# Patient Record
Sex: FEMALE | Race: BLACK OR AFRICAN AMERICAN | NOT HISPANIC OR LATINO | Employment: OTHER | URBAN - METROPOLITAN AREA
[De-identification: names, ages, dates, MRNs, and addresses within clinical notes are randomized per-mention and may not be internally consistent; named-entity substitution may affect disease eponyms.]

---

## 2017-12-18 ENCOUNTER — HOSPITAL ENCOUNTER (EMERGENCY)
Facility: HOSPITAL | Age: 72
Discharge: HOME | End: 2017-12-18
Attending: EMERGENCY MEDICINE
Payer: MEDICARE

## 2017-12-18 VITALS
HEIGHT: 72 IN | SYSTOLIC BLOOD PRESSURE: 109 MMHG | TEMPERATURE: 97 F | HEART RATE: 58 BPM | OXYGEN SATURATION: 97 % | BODY MASS INDEX: 17.88 KG/M2 | DIASTOLIC BLOOD PRESSURE: 57 MMHG | RESPIRATION RATE: 16 BRPM | WEIGHT: 132 LBS

## 2017-12-18 DIAGNOSIS — M54.30 SCIATICA, UNSPECIFIED LATERALITY: ICD-10-CM

## 2017-12-18 DIAGNOSIS — M54.50 LOW BACK PAIN: Primary | ICD-10-CM

## 2017-12-18 PROCEDURE — 63600175 PHARM REV CODE 636 W HCPCS: Performed by: PHYSICIAN ASSISTANT

## 2017-12-18 PROCEDURE — 96372 THER/PROPH/DIAG INJ SC/IM: CPT

## 2017-12-18 PROCEDURE — 25000003 PHARM REV CODE 250: Performed by: PHYSICIAN ASSISTANT

## 2017-12-18 PROCEDURE — 99284 EMERGENCY DEPT VISIT MOD MDM: CPT | Mod: 25

## 2017-12-18 RX ORDER — SULFAMETHOXAZOLE AND TRIMETHOPRIM 800; 160 MG/1; MG/1
1 TABLET ORAL 2 TIMES DAILY
COMMUNITY
End: 2021-02-21 | Stop reason: CLARIF

## 2017-12-18 RX ORDER — TRAMADOL HYDROCHLORIDE 50 MG/1
50 TABLET ORAL EVERY 6 HOURS PRN
Qty: 12 TABLET | Refills: 0 | Status: SHIPPED | OUTPATIENT
Start: 2017-12-18 | End: 2017-12-28

## 2017-12-18 RX ORDER — TRIAMCINOLONE ACETONIDE 40 MG/ML
80 INJECTION, SUSPENSION INTRA-ARTICULAR; INTRAMUSCULAR
Status: COMPLETED | OUTPATIENT
Start: 2017-12-18 | End: 2017-12-18

## 2017-12-18 RX ORDER — MELOXICAM 15 MG/1
15 TABLET ORAL DAILY
Qty: 7 TABLET | Refills: 0 | Status: SHIPPED | OUTPATIENT
Start: 2017-12-18 | End: 2021-02-21 | Stop reason: CLARIF

## 2017-12-18 RX ORDER — METHOCARBAMOL 750 MG/1
750 TABLET, FILM COATED ORAL EVERY 8 HOURS PRN
Qty: 20 TABLET | Refills: 0 | Status: SHIPPED | OUTPATIENT
Start: 2017-12-18 | End: 2017-12-28

## 2017-12-18 RX ORDER — TRAMADOL HYDROCHLORIDE 50 MG/1
50 TABLET ORAL
Status: COMPLETED | OUTPATIENT
Start: 2017-12-18 | End: 2017-12-18

## 2017-12-18 RX ORDER — ACETAMINOPHEN 500 MG
1000 TABLET ORAL
Status: COMPLETED | OUTPATIENT
Start: 2017-12-18 | End: 2017-12-18

## 2017-12-18 RX ADMIN — TRAMADOL HYDROCHLORIDE 50 MG: 50 TABLET, COATED ORAL at 12:12

## 2017-12-18 RX ADMIN — ACETAMINOPHEN 1000 MG: 500 TABLET ORAL at 12:12

## 2017-12-18 RX ADMIN — TRIAMCINOLONE ACETONIDE 80 MG: 40 INJECTION, SUSPENSION INTRA-ARTICULAR; INTRAMUSCULAR at 12:12

## 2017-12-18 NOTE — ED NOTES
Pt awake alert oriented X 4, pt reports lower back pain for the past 2 weeks, pt was treated in ED a week ago for same complaint dx with UTI and sent home with pain patch, pt reports taking medication as instructed, pt reports pain has not been relieved with patch, denies injury or trauma, lumbar tender to palpate pain worse with movement, pt family at bedside pt presents in wheelchair, denies fever, denies abd pain, denies n/v/d, denies chest pain or sob
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
## 2021-02-21 ENCOUNTER — HOSPITAL ENCOUNTER (INPATIENT)
Facility: HOSPITAL | Age: 76
LOS: 3 days | Discharge: HOME | DRG: 440 | End: 2021-02-24
Attending: INTERNAL MEDICINE | Admitting: INTERNAL MEDICINE
Payer: MEDICARE

## 2021-02-21 DIAGNOSIS — R10.10 UPPER ABDOMINAL PAIN: ICD-10-CM

## 2021-02-21 DIAGNOSIS — R91.1 SOLITARY PULMONARY NODULE: ICD-10-CM

## 2021-02-21 DIAGNOSIS — K85.00 IDIOPATHIC ACUTE PANCREATITIS, UNSPECIFIED COMPLICATION STATUS: ICD-10-CM

## 2021-02-21 DIAGNOSIS — R91.1 NODULE OF LOWER LOBE OF LEFT LUNG: ICD-10-CM

## 2021-02-21 DIAGNOSIS — F02.80 DEMENTIA DUE TO HEAD TRAUMA WITHOUT BEHAVIORAL DISTURBANCE: ICD-10-CM

## 2021-02-21 DIAGNOSIS — R91.8 MASS OF LOWER LOBE OF LEFT LUNG: ICD-10-CM

## 2021-02-21 DIAGNOSIS — S09.90XS DEMENTIA DUE TO HEAD TRAUMA WITHOUT BEHAVIORAL DISTURBANCE: ICD-10-CM

## 2021-02-21 DIAGNOSIS — K85.00 IDIOPATHIC ACUTE PANCREATITIS WITHOUT INFECTION OR NECROSIS: Primary | ICD-10-CM

## 2021-02-21 LAB
ALBUMIN SERPL BCP-MCNC: 4 G/DL (ref 3.5–5.2)
ALP SERPL-CCNC: 95 U/L (ref 55–135)
ALT SERPL W/O P-5'-P-CCNC: 6 U/L (ref 10–44)
ANION GAP SERPL CALC-SCNC: 14 MMOL/L (ref 8–16)
AST SERPL-CCNC: 19 U/L (ref 10–40)
BASOPHILS # BLD AUTO: 0.02 K/UL (ref 0–0.2)
BASOPHILS NFR BLD: 0.2 % (ref 0–1.9)
BILIRUB SERPL-MCNC: 0.2 MG/DL (ref 0.1–1)
BUN SERPL-MCNC: 16 MG/DL (ref 8–23)
CALCIUM SERPL-MCNC: 9.5 MG/DL (ref 8.7–10.5)
CHLORIDE SERPL-SCNC: 110 MMOL/L (ref 95–110)
CHOLEST SERPL-MCNC: 203 MG/DL (ref 120–199)
CHOLEST/HDLC SERPL: 4.6 {RATIO} (ref 2–5)
CO2 SERPL-SCNC: 18 MMOL/L (ref 23–29)
CREAT SERPL-MCNC: 1 MG/DL (ref 0.5–1.4)
CTP QC/QA: YES
DIFFERENTIAL METHOD: ABNORMAL
EOSINOPHIL # BLD AUTO: 0 K/UL (ref 0–0.5)
EOSINOPHIL NFR BLD: 0 % (ref 0–8)
ERYTHROCYTE [DISTWIDTH] IN BLOOD BY AUTOMATED COUNT: 14.6 % (ref 11.5–14.5)
EST. GFR  (AFRICAN AMERICAN): >60 ML/MIN/1.73 M^2
EST. GFR  (NON AFRICAN AMERICAN): 55 ML/MIN/1.73 M^2
ESTIMATED AVG GLUCOSE: 100 MG/DL (ref 68–131)
GLUCOSE SERPL-MCNC: 169 MG/DL (ref 70–110)
HBA1C MFR BLD: 5.1 % (ref 4–5.6)
HCT VFR BLD AUTO: 46.3 % (ref 37–48.5)
HDLC SERPL-MCNC: 44 MG/DL (ref 40–75)
HDLC SERPL: 21.7 % (ref 20–50)
HGB BLD-MCNC: 14.2 G/DL (ref 12–16)
IMM GRANULOCYTES # BLD AUTO: 0.04 K/UL (ref 0–0.04)
IMM GRANULOCYTES NFR BLD AUTO: 0.4 % (ref 0–0.5)
LACTATE SERPL-SCNC: 3 MMOL/L (ref 0.5–2.2)
LACTATE SERPL-SCNC: 5.6 MMOL/L (ref 0.5–2.2)
LDH SERPL L TO P-CCNC: 196 U/L (ref 110–260)
LDLC SERPL CALC-MCNC: 148 MG/DL (ref 63–159)
LIPASE SERPL-CCNC: 598 U/L (ref 4–60)
LYMPHOCYTES # BLD AUTO: 0.9 K/UL (ref 1–4.8)
LYMPHOCYTES NFR BLD: 9.3 % (ref 18–48)
MCH RBC QN AUTO: 26 PG (ref 27–31)
MCHC RBC AUTO-ENTMCNC: 30.7 G/DL (ref 32–36)
MCV RBC AUTO: 85 FL (ref 82–98)
MONOCYTES # BLD AUTO: 0.3 K/UL (ref 0.3–1)
MONOCYTES NFR BLD: 3.1 % (ref 4–15)
NEUTROPHILS # BLD AUTO: 8.5 K/UL (ref 1.8–7.7)
NEUTROPHILS NFR BLD: 87 % (ref 38–73)
NONHDLC SERPL-MCNC: 159 MG/DL
NRBC BLD-RTO: 0 /100 WBC
PLATELET # BLD AUTO: 210 K/UL (ref 150–350)
PMV BLD AUTO: 10.6 FL (ref 9.2–12.9)
POCT GLUCOSE: 98 MG/DL (ref 70–110)
POTASSIUM SERPL-SCNC: 3.8 MMOL/L (ref 3.5–5.1)
PROT SERPL-MCNC: 7.6 G/DL (ref 6–8.4)
RBC # BLD AUTO: 5.47 M/UL (ref 4–5.4)
SARS-COV-2 RDRP RESP QL NAA+PROBE: NEGATIVE
SODIUM SERPL-SCNC: 142 MMOL/L (ref 136–145)
TRIGL SERPL-MCNC: 55 MG/DL (ref 30–150)
TROPONIN I SERPL DL<=0.01 NG/ML-MCNC: 0.01 NG/ML (ref 0–0.03)
TSH SERPL DL<=0.005 MIU/L-ACNC: 0.75 UIU/ML (ref 0.4–4)
WBC # BLD AUTO: 9.74 K/UL (ref 3.9–12.7)

## 2021-02-21 PROCEDURE — 25500020 PHARM REV CODE 255: Performed by: EMERGENCY MEDICINE

## 2021-02-21 PROCEDURE — 80053 COMPREHEN METABOLIC PANEL: CPT

## 2021-02-21 PROCEDURE — 83605 ASSAY OF LACTIC ACID: CPT

## 2021-02-21 PROCEDURE — 63600175 PHARM REV CODE 636 W HCPCS: Performed by: STUDENT IN AN ORGANIZED HEALTH CARE EDUCATION/TRAINING PROGRAM

## 2021-02-21 PROCEDURE — 96361 HYDRATE IV INFUSION ADD-ON: CPT

## 2021-02-21 PROCEDURE — 96361 HYDRATE IV INFUSION ADD-ON: CPT | Performed by: EMERGENCY MEDICINE

## 2021-02-21 PROCEDURE — 99285 EMERGENCY DEPT VISIT HI MDM: CPT | Mod: 25

## 2021-02-21 PROCEDURE — G0378 HOSPITAL OBSERVATION PER HR: HCPCS

## 2021-02-21 PROCEDURE — 93010 EKG 12-LEAD: ICD-10-PCS | Mod: ,,, | Performed by: INTERNAL MEDICINE

## 2021-02-21 PROCEDURE — 83690 ASSAY OF LIPASE: CPT

## 2021-02-21 PROCEDURE — 96376 TX/PRO/DX INJ SAME DRUG ADON: CPT

## 2021-02-21 PROCEDURE — 84484 ASSAY OF TROPONIN QUANT: CPT

## 2021-02-21 PROCEDURE — U0002 COVID-19 LAB TEST NON-CDC: HCPCS | Performed by: EMERGENCY MEDICINE

## 2021-02-21 PROCEDURE — 85025 COMPLETE CBC W/AUTO DIFF WBC: CPT

## 2021-02-21 PROCEDURE — 96375 TX/PRO/DX INJ NEW DRUG ADDON: CPT

## 2021-02-21 PROCEDURE — 83036 HEMOGLOBIN GLYCOSYLATED A1C: CPT

## 2021-02-21 PROCEDURE — 93010 ELECTROCARDIOGRAM REPORT: CPT | Mod: ,,, | Performed by: INTERNAL MEDICINE

## 2021-02-21 PROCEDURE — 63600175 PHARM REV CODE 636 W HCPCS: Performed by: EMERGENCY MEDICINE

## 2021-02-21 PROCEDURE — 96374 THER/PROPH/DIAG INJ IV PUSH: CPT

## 2021-02-21 PROCEDURE — 93005 ELECTROCARDIOGRAM TRACING: CPT

## 2021-02-21 PROCEDURE — 83615 LACTATE (LD) (LDH) ENZYME: CPT

## 2021-02-21 PROCEDURE — 84443 ASSAY THYROID STIM HORMONE: CPT

## 2021-02-21 PROCEDURE — 83605 ASSAY OF LACTIC ACID: CPT | Mod: 91

## 2021-02-21 PROCEDURE — 11000001 HC ACUTE MED/SURG PRIVATE ROOM

## 2021-02-21 PROCEDURE — 80061 LIPID PANEL: CPT

## 2021-02-21 RX ORDER — SODIUM CHLORIDE, SODIUM LACTATE, POTASSIUM CHLORIDE, CALCIUM CHLORIDE 600; 310; 30; 20 MG/100ML; MG/100ML; MG/100ML; MG/100ML
INJECTION, SOLUTION INTRAVENOUS CONTINUOUS
Status: DISCONTINUED | OUTPATIENT
Start: 2021-02-21 | End: 2021-02-24 | Stop reason: HOSPADM

## 2021-02-21 RX ORDER — GLUCAGON 1 MG
1 KIT INJECTION
Status: DISCONTINUED | OUTPATIENT
Start: 2021-02-21 | End: 2021-02-24 | Stop reason: HOSPADM

## 2021-02-21 RX ORDER — IBUPROFEN 200 MG
16 TABLET ORAL
Status: DISCONTINUED | OUTPATIENT
Start: 2021-02-21 | End: 2021-02-24 | Stop reason: HOSPADM

## 2021-02-21 RX ORDER — HYDROCODONE BITARTRATE AND ACETAMINOPHEN 5; 325 MG/1; MG/1
1 TABLET ORAL EVERY 6 HOURS PRN
Status: DISCONTINUED | OUTPATIENT
Start: 2021-02-21 | End: 2021-02-21

## 2021-02-21 RX ORDER — MORPHINE SULFATE 4 MG/ML
4 INJECTION, SOLUTION INTRAMUSCULAR; INTRAVENOUS
Status: COMPLETED | OUTPATIENT
Start: 2021-02-21 | End: 2021-02-21

## 2021-02-21 RX ORDER — MORPHINE SULFATE 2 MG/ML
2 INJECTION, SOLUTION INTRAMUSCULAR; INTRAVENOUS
Status: COMPLETED | OUTPATIENT
Start: 2021-02-21 | End: 2021-02-21

## 2021-02-21 RX ORDER — ONDANSETRON 2 MG/ML
4 INJECTION INTRAMUSCULAR; INTRAVENOUS
Status: COMPLETED | OUTPATIENT
Start: 2021-02-21 | End: 2021-02-21

## 2021-02-21 RX ORDER — SODIUM CHLORIDE 0.9 % (FLUSH) 0.9 %
10 SYRINGE (ML) INJECTION
Status: DISCONTINUED | OUTPATIENT
Start: 2021-02-21 | End: 2021-02-24 | Stop reason: HOSPADM

## 2021-02-21 RX ORDER — KETOROLAC TROMETHAMINE 30 MG/ML
15 INJECTION, SOLUTION INTRAMUSCULAR; INTRAVENOUS EVERY 6 HOURS PRN
Status: DISCONTINUED | OUTPATIENT
Start: 2021-02-21 | End: 2021-02-22

## 2021-02-21 RX ORDER — ONDANSETRON 2 MG/ML
4 INJECTION INTRAMUSCULAR; INTRAVENOUS EVERY 8 HOURS PRN
Status: DISCONTINUED | OUTPATIENT
Start: 2021-02-21 | End: 2021-02-24 | Stop reason: HOSPADM

## 2021-02-21 RX ORDER — MORPHINE SULFATE 4 MG/ML
4 INJECTION, SOLUTION INTRAMUSCULAR; INTRAVENOUS EVERY 6 HOURS PRN
Status: DISCONTINUED | OUTPATIENT
Start: 2021-02-21 | End: 2021-02-24 | Stop reason: HOSPADM

## 2021-02-21 RX ORDER — IBUPROFEN 200 MG
24 TABLET ORAL
Status: DISCONTINUED | OUTPATIENT
Start: 2021-02-21 | End: 2021-02-24 | Stop reason: HOSPADM

## 2021-02-21 RX ORDER — KETOROLAC TROMETHAMINE 30 MG/ML
15 INJECTION, SOLUTION INTRAMUSCULAR; INTRAVENOUS EVERY 6 HOURS PRN
Status: DISCONTINUED | OUTPATIENT
Start: 2021-02-21 | End: 2021-02-21

## 2021-02-21 RX ORDER — ENOXAPARIN SODIUM 100 MG/ML
40 INJECTION SUBCUTANEOUS EVERY 24 HOURS
Status: DISCONTINUED | OUTPATIENT
Start: 2021-02-21 | End: 2021-02-24 | Stop reason: HOSPADM

## 2021-02-21 RX ADMIN — SODIUM CHLORIDE, SODIUM LACTATE, POTASSIUM CHLORIDE, AND CALCIUM CHLORIDE 500 ML: .6; .31; .03; .02 INJECTION, SOLUTION INTRAVENOUS at 05:02

## 2021-02-21 RX ADMIN — ENOXAPARIN SODIUM 40 MG: 40 INJECTION SUBCUTANEOUS at 10:02

## 2021-02-21 RX ADMIN — MORPHINE SULFATE 2 MG: 2 INJECTION, SOLUTION INTRAMUSCULAR; INTRAVENOUS at 08:02

## 2021-02-21 RX ADMIN — MORPHINE SULFATE 2 MG: 2 INJECTION, SOLUTION INTRAMUSCULAR; INTRAVENOUS at 05:02

## 2021-02-21 RX ADMIN — SODIUM CHLORIDE, SODIUM LACTATE, POTASSIUM CHLORIDE, AND CALCIUM CHLORIDE 1000 ML: .6; .31; .03; .02 INJECTION, SOLUTION INTRAVENOUS at 07:02

## 2021-02-21 RX ADMIN — IOHEXOL 75 ML: 350 INJECTION, SOLUTION INTRAVENOUS at 07:02

## 2021-02-21 RX ADMIN — ONDANSETRON HYDROCHLORIDE 4 MG: 2 SOLUTION INTRAMUSCULAR; INTRAVENOUS at 05:02

## 2021-02-21 RX ADMIN — MORPHINE SULFATE 4 MG: 4 INJECTION, SOLUTION INTRAMUSCULAR; INTRAVENOUS at 07:02

## 2021-02-21 RX ADMIN — SODIUM CHLORIDE, SODIUM LACTATE, POTASSIUM CHLORIDE, AND CALCIUM CHLORIDE: .6; .31; .03; .02 INJECTION, SOLUTION INTRAVENOUS at 09:02

## 2021-02-22 LAB
ALBUMIN SERPL BCP-MCNC: 3.4 G/DL (ref 3.5–5.2)
ALP SERPL-CCNC: 81 U/L (ref 55–135)
ALT SERPL W/O P-5'-P-CCNC: <5 U/L (ref 10–44)
ANION GAP SERPL CALC-SCNC: 7 MMOL/L (ref 8–16)
APTT BLDCRRT: 31 SEC (ref 21–32)
AST SERPL-CCNC: 20 U/L (ref 10–40)
BASOPHILS # BLD AUTO: 0.01 K/UL (ref 0–0.2)
BASOPHILS NFR BLD: 0.1 % (ref 0–1.9)
BILIRUB SERPL-MCNC: 0.4 MG/DL (ref 0.1–1)
BUN SERPL-MCNC: 12 MG/DL (ref 8–23)
CALCIUM SERPL-MCNC: 9.1 MG/DL (ref 8.7–10.5)
CHLORIDE SERPL-SCNC: 109 MMOL/L (ref 95–110)
CO2 SERPL-SCNC: 25 MMOL/L (ref 23–29)
CREAT SERPL-MCNC: 0.8 MG/DL (ref 0.5–1.4)
DIFFERENTIAL METHOD: ABNORMAL
EOSINOPHIL # BLD AUTO: 0 K/UL (ref 0–0.5)
EOSINOPHIL NFR BLD: 0.1 % (ref 0–8)
ERYTHROCYTE [DISTWIDTH] IN BLOOD BY AUTOMATED COUNT: 14.6 % (ref 11.5–14.5)
EST. GFR  (AFRICAN AMERICAN): >60 ML/MIN/1.73 M^2
EST. GFR  (NON AFRICAN AMERICAN): >60 ML/MIN/1.73 M^2
GLUCOSE SERPL-MCNC: 81 MG/DL (ref 70–110)
HCT VFR BLD AUTO: 39.5 % (ref 37–48.5)
HGB BLD-MCNC: 12.2 G/DL (ref 12–16)
IMM GRANULOCYTES # BLD AUTO: 0.03 K/UL (ref 0–0.04)
IMM GRANULOCYTES NFR BLD AUTO: 0.4 % (ref 0–0.5)
INR PPP: 1.1 (ref 0.8–1.2)
LACTATE SERPL-SCNC: 1.1 MMOL/L (ref 0.5–2.2)
LYMPHOCYTES # BLD AUTO: 1.6 K/UL (ref 1–4.8)
LYMPHOCYTES NFR BLD: 18.9 % (ref 18–48)
MAGNESIUM SERPL-MCNC: 1.9 MG/DL (ref 1.6–2.6)
MCH RBC QN AUTO: 25.6 PG (ref 27–31)
MCHC RBC AUTO-ENTMCNC: 30.9 G/DL (ref 32–36)
MCV RBC AUTO: 83 FL (ref 82–98)
MONOCYTES # BLD AUTO: 0.5 K/UL (ref 0.3–1)
MONOCYTES NFR BLD: 6.1 % (ref 4–15)
NEUTROPHILS # BLD AUTO: 6.3 K/UL (ref 1.8–7.7)
NEUTROPHILS NFR BLD: 74.4 % (ref 38–73)
NRBC BLD-RTO: 0 /100 WBC
PHOSPHATE SERPL-MCNC: 3 MG/DL (ref 2.7–4.5)
PLATELET # BLD AUTO: 187 K/UL (ref 150–350)
PMV BLD AUTO: 11.5 FL (ref 9.2–12.9)
POCT GLUCOSE: 101 MG/DL (ref 70–110)
POCT GLUCOSE: 73 MG/DL (ref 70–110)
POTASSIUM SERPL-SCNC: 3.9 MMOL/L (ref 3.5–5.1)
PROT SERPL-MCNC: 6.5 G/DL (ref 6–8.4)
PROTHROMBIN TIME: 11.3 SEC (ref 9–12.5)
RBC # BLD AUTO: 4.77 M/UL (ref 4–5.4)
SODIUM SERPL-SCNC: 141 MMOL/L (ref 136–145)
WBC # BLD AUTO: 8.41 K/UL (ref 3.9–12.7)

## 2021-02-22 PROCEDURE — 63600175 PHARM REV CODE 636 W HCPCS: Performed by: STUDENT IN AN ORGANIZED HEALTH CARE EDUCATION/TRAINING PROGRAM

## 2021-02-22 PROCEDURE — 85025 COMPLETE CBC W/AUTO DIFF WBC: CPT

## 2021-02-22 PROCEDURE — 85730 THROMBOPLASTIN TIME PARTIAL: CPT

## 2021-02-22 PROCEDURE — 97162 PT EVAL MOD COMPLEX 30 MIN: CPT | Performed by: PHYSICAL THERAPIST

## 2021-02-22 PROCEDURE — 25000003 PHARM REV CODE 250: Performed by: STUDENT IN AN ORGANIZED HEALTH CARE EDUCATION/TRAINING PROGRAM

## 2021-02-22 PROCEDURE — 84100 ASSAY OF PHOSPHORUS: CPT

## 2021-02-22 PROCEDURE — 97535 SELF CARE MNGMENT TRAINING: CPT

## 2021-02-22 PROCEDURE — 80053 COMPREHEN METABOLIC PANEL: CPT

## 2021-02-22 PROCEDURE — 81003 URINALYSIS AUTO W/O SCOPE: CPT

## 2021-02-22 PROCEDURE — 36415 COLL VENOUS BLD VENIPUNCTURE: CPT

## 2021-02-22 PROCEDURE — 85610 PROTHROMBIN TIME: CPT

## 2021-02-22 PROCEDURE — 83605 ASSAY OF LACTIC ACID: CPT

## 2021-02-22 PROCEDURE — 11000001 HC ACUTE MED/SURG PRIVATE ROOM

## 2021-02-22 PROCEDURE — 99223 PR INITIAL HOSPITAL CARE,LEVL III: ICD-10-PCS | Mod: AI,,, | Performed by: INTERNAL MEDICINE

## 2021-02-22 PROCEDURE — 97165 OT EVAL LOW COMPLEX 30 MIN: CPT

## 2021-02-22 PROCEDURE — 83735 ASSAY OF MAGNESIUM: CPT

## 2021-02-22 PROCEDURE — 99223 1ST HOSP IP/OBS HIGH 75: CPT | Mod: AI,,, | Performed by: INTERNAL MEDICINE

## 2021-02-22 PROCEDURE — 96376 TX/PRO/DX INJ SAME DRUG ADON: CPT | Performed by: EMERGENCY MEDICINE

## 2021-02-22 PROCEDURE — 99223 1ST HOSP IP/OBS HIGH 75: CPT | Mod: GC,,, | Performed by: INTERNAL MEDICINE

## 2021-02-22 PROCEDURE — 96361 HYDRATE IV INFUSION ADD-ON: CPT | Performed by: EMERGENCY MEDICINE

## 2021-02-22 PROCEDURE — 25500020 PHARM REV CODE 255: Performed by: INTERNAL MEDICINE

## 2021-02-22 PROCEDURE — 99223 PR INITIAL HOSPITAL CARE,LEVL III: ICD-10-PCS | Mod: GC,,, | Performed by: INTERNAL MEDICINE

## 2021-02-22 PROCEDURE — 96372 THER/PROPH/DIAG INJ SC/IM: CPT | Mod: 59 | Performed by: EMERGENCY MEDICINE

## 2021-02-22 RX ORDER — TRAMADOL HYDROCHLORIDE 50 MG/1
50 TABLET ORAL EVERY 6 HOURS PRN
Status: DISCONTINUED | OUTPATIENT
Start: 2021-02-22 | End: 2021-02-22

## 2021-02-22 RX ORDER — OXYCODONE AND ACETAMINOPHEN 5; 325 MG/1; MG/1
1 TABLET ORAL EVERY 6 HOURS
Status: DISCONTINUED | OUTPATIENT
Start: 2021-02-22 | End: 2021-02-24 | Stop reason: HOSPADM

## 2021-02-22 RX ADMIN — IOHEXOL 75 ML: 350 INJECTION, SOLUTION INTRAVENOUS at 02:02

## 2021-02-22 RX ADMIN — THERA TABS 1 TABLET: TAB at 08:02

## 2021-02-22 RX ADMIN — SODIUM CHLORIDE, SODIUM LACTATE, POTASSIUM CHLORIDE, AND CALCIUM CHLORIDE: .6; .31; .03; .02 INJECTION, SOLUTION INTRAVENOUS at 08:02

## 2021-02-22 RX ADMIN — OXYCODONE HYDROCHLORIDE AND ACETAMINOPHEN 1 TABLET: 5; 325 TABLET ORAL at 11:02

## 2021-02-22 RX ADMIN — ENOXAPARIN SODIUM 40 MG: 40 INJECTION SUBCUTANEOUS at 08:02

## 2021-02-22 RX ADMIN — OXYCODONE HYDROCHLORIDE AND ACETAMINOPHEN 1 TABLET: 5; 325 TABLET ORAL at 06:02

## 2021-02-22 RX ADMIN — MORPHINE SULFATE 4 MG: 4 INJECTION INTRAVENOUS at 08:02

## 2021-02-23 LAB
ALBUMIN SERPL BCP-MCNC: 3.3 G/DL (ref 3.5–5.2)
ALP SERPL-CCNC: 98 U/L (ref 55–135)
ALT SERPL W/O P-5'-P-CCNC: 44 U/L (ref 10–44)
ANION GAP SERPL CALC-SCNC: 7 MMOL/L (ref 8–16)
AST SERPL-CCNC: 109 U/L (ref 10–40)
BASOPHILS # BLD AUTO: 0.01 K/UL (ref 0–0.2)
BASOPHILS NFR BLD: 0.1 % (ref 0–1.9)
BILIRUB SERPL-MCNC: 1 MG/DL (ref 0.1–1)
BILIRUB UR QL STRIP: NEGATIVE
BUN SERPL-MCNC: 7 MG/DL (ref 8–23)
CALCIUM SERPL-MCNC: 8.7 MG/DL (ref 8.7–10.5)
CHLORIDE SERPL-SCNC: 105 MMOL/L (ref 95–110)
CLARITY UR: CLEAR
CO2 SERPL-SCNC: 27 MMOL/L (ref 23–29)
COLOR UR: YELLOW
CREAT SERPL-MCNC: 0.8 MG/DL (ref 0.5–1.4)
DIFFERENTIAL METHOD: ABNORMAL
EOSINOPHIL # BLD AUTO: 0 K/UL (ref 0–0.5)
EOSINOPHIL NFR BLD: 0.5 % (ref 0–8)
ERYTHROCYTE [DISTWIDTH] IN BLOOD BY AUTOMATED COUNT: 14.6 % (ref 11.5–14.5)
EST. GFR  (AFRICAN AMERICAN): >60 ML/MIN/1.73 M^2
EST. GFR  (NON AFRICAN AMERICAN): >60 ML/MIN/1.73 M^2
GLUCOSE SERPL-MCNC: 75 MG/DL (ref 70–110)
GLUCOSE UR QL STRIP: NEGATIVE
HCT VFR BLD AUTO: 35 % (ref 37–48.5)
HGB BLD-MCNC: 11.2 G/DL (ref 12–16)
HGB UR QL STRIP: NEGATIVE
IMM GRANULOCYTES # BLD AUTO: 0.02 K/UL (ref 0–0.04)
IMM GRANULOCYTES NFR BLD AUTO: 0.3 % (ref 0–0.5)
KETONES UR QL STRIP: NEGATIVE
LEUKOCYTE ESTERASE UR QL STRIP: NEGATIVE
LYMPHOCYTES # BLD AUTO: 1.9 K/UL (ref 1–4.8)
LYMPHOCYTES NFR BLD: 25 % (ref 18–48)
MAGNESIUM SERPL-MCNC: 1.7 MG/DL (ref 1.6–2.6)
MCH RBC QN AUTO: 26.2 PG (ref 27–31)
MCHC RBC AUTO-ENTMCNC: 32 G/DL (ref 32–36)
MCV RBC AUTO: 82 FL (ref 82–98)
MONOCYTES # BLD AUTO: 0.1 K/UL (ref 0.3–1)
MONOCYTES NFR BLD: 1.9 % (ref 4–15)
NEUTROPHILS # BLD AUTO: 5.4 K/UL (ref 1.8–7.7)
NEUTROPHILS NFR BLD: 72.2 % (ref 38–73)
NITRITE UR QL STRIP: NEGATIVE
NRBC BLD-RTO: 0 /100 WBC
PH UR STRIP: 6 [PH] (ref 5–8)
PHOSPHATE SERPL-MCNC: 2.7 MG/DL (ref 2.7–4.5)
PLATELET # BLD AUTO: 161 K/UL (ref 150–350)
PMV BLD AUTO: 11.9 FL (ref 9.2–12.9)
POCT GLUCOSE: 104 MG/DL (ref 70–110)
POCT GLUCOSE: 61 MG/DL (ref 70–110)
POCT GLUCOSE: 69 MG/DL (ref 70–110)
POCT GLUCOSE: 71 MG/DL (ref 70–110)
POCT GLUCOSE: 72 MG/DL (ref 70–110)
POTASSIUM SERPL-SCNC: 3.4 MMOL/L (ref 3.5–5.1)
PROT SERPL-MCNC: 6.4 G/DL (ref 6–8.4)
PROT UR QL STRIP: NEGATIVE
RBC # BLD AUTO: 4.27 M/UL (ref 4–5.4)
RHEUMATOID FACT SERPL-ACNC: <10 IU/ML (ref 0–15)
SODIUM SERPL-SCNC: 139 MMOL/L (ref 136–145)
SP GR UR STRIP: 1.01 (ref 1–1.03)
URN SPEC COLLECT METH UR: ABNORMAL
UROBILINOGEN UR STRIP-ACNC: ABNORMAL EU/DL
WBC # BLD AUTO: 7.43 K/UL (ref 3.9–12.7)

## 2021-02-23 PROCEDURE — 36415 COLL VENOUS BLD VENIPUNCTURE: CPT

## 2021-02-23 PROCEDURE — 80053 COMPREHEN METABOLIC PANEL: CPT

## 2021-02-23 PROCEDURE — 25000003 PHARM REV CODE 250: Performed by: STUDENT IN AN ORGANIZED HEALTH CARE EDUCATION/TRAINING PROGRAM

## 2021-02-23 PROCEDURE — 86431 RHEUMATOID FACTOR QUANT: CPT

## 2021-02-23 PROCEDURE — 97535 SELF CARE MNGMENT TRAINING: CPT | Mod: CO

## 2021-02-23 PROCEDURE — 83735 ASSAY OF MAGNESIUM: CPT

## 2021-02-23 PROCEDURE — 97530 THERAPEUTIC ACTIVITIES: CPT | Mod: CQ

## 2021-02-23 PROCEDURE — 86200 CCP ANTIBODY: CPT

## 2021-02-23 PROCEDURE — 85025 COMPLETE CBC W/AUTO DIFF WBC: CPT

## 2021-02-23 PROCEDURE — 11000001 HC ACUTE MED/SURG PRIVATE ROOM

## 2021-02-23 PROCEDURE — 86038 ANTINUCLEAR ANTIBODIES: CPT

## 2021-02-23 PROCEDURE — 97116 GAIT TRAINING THERAPY: CPT | Mod: CQ

## 2021-02-23 PROCEDURE — 84100 ASSAY OF PHOSPHORUS: CPT

## 2021-02-23 PROCEDURE — 63600175 PHARM REV CODE 636 W HCPCS: Performed by: STUDENT IN AN ORGANIZED HEALTH CARE EDUCATION/TRAINING PROGRAM

## 2021-02-23 RX ORDER — POLYETHYLENE GLYCOL 3350 17 G/17G
17 POWDER, FOR SOLUTION ORAL 2 TIMES DAILY
Status: DISCONTINUED | OUTPATIENT
Start: 2021-02-23 | End: 2021-02-24 | Stop reason: HOSPADM

## 2021-02-23 RX ORDER — MAGNESIUM SULFATE HEPTAHYDRATE 40 MG/ML
2 INJECTION, SOLUTION INTRAVENOUS ONCE
Status: COMPLETED | OUTPATIENT
Start: 2021-02-23 | End: 2021-02-23

## 2021-02-23 RX ADMIN — THERA TABS 1 TABLET: TAB at 08:02

## 2021-02-23 RX ADMIN — POLYETHYLENE GLYCOL (3350) 17 G: 17 POWDER, FOR SOLUTION ORAL at 08:02

## 2021-02-23 RX ADMIN — MAGNESIUM SULFATE 2 G: 2 INJECTION INTRAVENOUS at 10:02

## 2021-02-23 RX ADMIN — OXYCODONE HYDROCHLORIDE AND ACETAMINOPHEN 1 TABLET: 5; 325 TABLET ORAL at 11:02

## 2021-02-23 RX ADMIN — OXYCODONE HYDROCHLORIDE AND ACETAMINOPHEN 1 TABLET: 5; 325 TABLET ORAL at 12:02

## 2021-02-23 RX ADMIN — OXYCODONE HYDROCHLORIDE AND ACETAMINOPHEN 1 TABLET: 5; 325 TABLET ORAL at 05:02

## 2021-02-23 RX ADMIN — POLYETHYLENE GLYCOL (3350) 17 G: 17 POWDER, FOR SOLUTION ORAL at 10:02

## 2021-02-23 RX ADMIN — SODIUM CHLORIDE, SODIUM LACTATE, POTASSIUM CHLORIDE, AND CALCIUM CHLORIDE: .6; .31; .03; .02 INJECTION, SOLUTION INTRAVENOUS at 03:02

## 2021-02-23 RX ADMIN — SODIUM CHLORIDE, SODIUM LACTATE, POTASSIUM CHLORIDE, AND CALCIUM CHLORIDE: .6; .31; .03; .02 INJECTION, SOLUTION INTRAVENOUS at 09:02

## 2021-02-23 RX ADMIN — POTASSIUM BICARBONATE 25 MEQ: 977.5 TABLET, EFFERVESCENT ORAL at 01:02

## 2021-02-23 RX ADMIN — DEXTROSE MONOHYDRATE 12.5 G: 500 INJECTION PARENTERAL at 04:02

## 2021-02-23 RX ADMIN — POTASSIUM BICARBONATE 25 MEQ: 977.5 TABLET, EFFERVESCENT ORAL at 11:02

## 2021-02-23 RX ADMIN — DEXTROSE MONOHYDRATE 12.5 G: 500 INJECTION PARENTERAL at 05:02

## 2021-02-23 RX ADMIN — ENOXAPARIN SODIUM 40 MG: 40 INJECTION SUBCUTANEOUS at 08:02

## 2021-02-23 RX ADMIN — DEXTROSE MONOHYDRATE 25 G: 25 INJECTION, SOLUTION INTRAVENOUS at 03:02

## 2021-02-24 VITALS
HEIGHT: 72 IN | SYSTOLIC BLOOD PRESSURE: 115 MMHG | OXYGEN SATURATION: 97 % | BODY MASS INDEX: 20.88 KG/M2 | RESPIRATION RATE: 24 BRPM | WEIGHT: 154.13 LBS | DIASTOLIC BLOOD PRESSURE: 65 MMHG | TEMPERATURE: 99 F | HEART RATE: 88 BPM

## 2021-02-24 DIAGNOSIS — R91.1 NODULE OF LOWER LOBE OF LEFT LUNG: Primary | ICD-10-CM

## 2021-02-24 LAB
ALBUMIN SERPL BCP-MCNC: 3.2 G/DL (ref 3.5–5.2)
ALP SERPL-CCNC: 114 U/L (ref 55–135)
ALT SERPL W/O P-5'-P-CCNC: 37 U/L (ref 10–44)
ANA SER QL IF: NORMAL
ANION GAP SERPL CALC-SCNC: 10 MMOL/L (ref 8–16)
AST SERPL-CCNC: 89 U/L (ref 10–40)
BASOPHILS # BLD AUTO: 0.02 K/UL (ref 0–0.2)
BASOPHILS NFR BLD: 0.2 % (ref 0–1.9)
BILIRUB SERPL-MCNC: 1 MG/DL (ref 0.1–1)
BUN SERPL-MCNC: 5 MG/DL (ref 8–23)
CALCIUM SERPL-MCNC: 8.8 MG/DL (ref 8.7–10.5)
CHLORIDE SERPL-SCNC: 108 MMOL/L (ref 95–110)
CO2 SERPL-SCNC: 18 MMOL/L (ref 23–29)
CREAT SERPL-MCNC: 0.7 MG/DL (ref 0.5–1.4)
DIFFERENTIAL METHOD: ABNORMAL
EOSINOPHIL # BLD AUTO: 0.1 K/UL (ref 0–0.5)
EOSINOPHIL NFR BLD: 0.9 % (ref 0–8)
ERYTHROCYTE [DISTWIDTH] IN BLOOD BY AUTOMATED COUNT: 15.1 % (ref 11.5–14.5)
EST. GFR  (AFRICAN AMERICAN): >60 ML/MIN/1.73 M^2
EST. GFR  (NON AFRICAN AMERICAN): >60 ML/MIN/1.73 M^2
GLUCOSE SERPL-MCNC: 68 MG/DL (ref 70–110)
HCT VFR BLD AUTO: 41.7 % (ref 37–48.5)
HGB BLD-MCNC: 12.5 G/DL (ref 12–16)
IMM GRANULOCYTES # BLD AUTO: 0.03 K/UL (ref 0–0.04)
IMM GRANULOCYTES NFR BLD AUTO: 0.3 % (ref 0–0.5)
LYMPHOCYTES # BLD AUTO: 2.1 K/UL (ref 1–4.8)
LYMPHOCYTES NFR BLD: 22.9 % (ref 18–48)
MAGNESIUM SERPL-MCNC: 2.2 MG/DL (ref 1.6–2.6)
MCH RBC QN AUTO: 25.8 PG (ref 27–31)
MCHC RBC AUTO-ENTMCNC: 30 G/DL (ref 32–36)
MCV RBC AUTO: 86 FL (ref 82–98)
MONOCYTES # BLD AUTO: 0.6 K/UL (ref 0.3–1)
MONOCYTES NFR BLD: 6.1 % (ref 4–15)
NEUTROPHILS # BLD AUTO: 6.3 K/UL (ref 1.8–7.7)
NEUTROPHILS NFR BLD: 69.6 % (ref 38–73)
NRBC BLD-RTO: 0 /100 WBC
PHOSPHATE SERPL-MCNC: 2.8 MG/DL (ref 2.7–4.5)
PLATELET # BLD AUTO: 147 K/UL (ref 150–350)
PMV BLD AUTO: 12.2 FL (ref 9.2–12.9)
POCT GLUCOSE: 83 MG/DL (ref 70–110)
POCT GLUCOSE: 86 MG/DL (ref 70–110)
POTASSIUM SERPL-SCNC: 4.2 MMOL/L (ref 3.5–5.1)
PROT SERPL-MCNC: 6.7 G/DL (ref 6–8.4)
RBC # BLD AUTO: 4.84 M/UL (ref 4–5.4)
SODIUM SERPL-SCNC: 136 MMOL/L (ref 136–145)
WBC # BLD AUTO: 9.07 K/UL (ref 3.9–12.7)

## 2021-02-24 PROCEDURE — 97116 GAIT TRAINING THERAPY: CPT | Mod: CQ

## 2021-02-24 PROCEDURE — 97110 THERAPEUTIC EXERCISES: CPT | Mod: CQ

## 2021-02-24 PROCEDURE — 25000003 PHARM REV CODE 250: Performed by: STUDENT IN AN ORGANIZED HEALTH CARE EDUCATION/TRAINING PROGRAM

## 2021-02-24 PROCEDURE — 85025 COMPLETE CBC W/AUTO DIFF WBC: CPT

## 2021-02-24 PROCEDURE — 83735 ASSAY OF MAGNESIUM: CPT

## 2021-02-24 PROCEDURE — 80053 COMPREHEN METABOLIC PANEL: CPT

## 2021-02-24 PROCEDURE — 84100 ASSAY OF PHOSPHORUS: CPT

## 2021-02-24 PROCEDURE — 63600175 PHARM REV CODE 636 W HCPCS: Performed by: STUDENT IN AN ORGANIZED HEALTH CARE EDUCATION/TRAINING PROGRAM

## 2021-02-24 RX ORDER — ONDANSETRON 4 MG/1
4 TABLET, FILM COATED ORAL EVERY 6 HOURS PRN
Qty: 28 TABLET | Refills: 0 | Status: SHIPPED | OUTPATIENT
Start: 2021-02-24 | End: 2021-03-03

## 2021-02-24 RX ORDER — OXYCODONE AND ACETAMINOPHEN 5; 325 MG/1; MG/1
1 TABLET ORAL EVERY 6 HOURS PRN
Qty: 20 TABLET | Refills: 0 | Status: SHIPPED | OUTPATIENT
Start: 2021-02-24 | End: 2021-03-01

## 2021-02-24 RX ADMIN — OXYCODONE HYDROCHLORIDE AND ACETAMINOPHEN 1 TABLET: 5; 325 TABLET ORAL at 05:02

## 2021-02-24 RX ADMIN — THERA TABS 1 TABLET: TAB at 08:02

## 2021-02-24 RX ADMIN — POLYETHYLENE GLYCOL (3350) 17 G: 17 POWDER, FOR SOLUTION ORAL at 08:02

## 2021-02-24 RX ADMIN — SODIUM CHLORIDE, SODIUM LACTATE, POTASSIUM CHLORIDE, AND CALCIUM CHLORIDE: .6; .31; .03; .02 INJECTION, SOLUTION INTRAVENOUS at 05:02

## 2021-02-25 ENCOUNTER — PATIENT OUTREACH (OUTPATIENT)
Dept: ADMINISTRATIVE | Facility: CLINIC | Age: 76
End: 2021-02-25

## 2021-02-25 LAB — CCP AB SER IA-ACNC: <0.5 U/ML

## 2021-03-15 ENCOUNTER — HOSPITAL ENCOUNTER (OUTPATIENT)
Dept: RADIOLOGY | Facility: HOSPITAL | Age: 76
Discharge: HOME | End: 2021-03-15
Attending: STUDENT IN AN ORGANIZED HEALTH CARE EDUCATION/TRAINING PROGRAM
Payer: MEDICARE

## 2021-03-15 ENCOUNTER — HOSPITAL ENCOUNTER (OUTPATIENT)
Facility: HOSPITAL | Age: 76
Discharge: HOME | End: 2021-03-16
Attending: EMERGENCY MEDICINE | Admitting: EMERGENCY MEDICINE
Payer: MEDICARE

## 2021-03-15 DIAGNOSIS — R91.8 MASS OF LOWER LOBE OF LEFT LUNG: ICD-10-CM

## 2021-03-15 DIAGNOSIS — N17.9 ACUTE KIDNEY FAILURE: ICD-10-CM

## 2021-03-15 DIAGNOSIS — S09.90XS DEMENTIA DUE TO HEAD TRAUMA WITHOUT BEHAVIORAL DISTURBANCE: ICD-10-CM

## 2021-03-15 DIAGNOSIS — R10.10 UPPER ABDOMINAL PAIN: ICD-10-CM

## 2021-03-15 DIAGNOSIS — R91.1 NODULE OF LOWER LOBE OF LEFT LUNG: ICD-10-CM

## 2021-03-15 DIAGNOSIS — F02.80 DEMENTIA DUE TO HEAD TRAUMA WITHOUT BEHAVIORAL DISTURBANCE: ICD-10-CM

## 2021-03-15 DIAGNOSIS — K85.00 IDIOPATHIC ACUTE PANCREATITIS, UNSPECIFIED COMPLICATION STATUS: ICD-10-CM

## 2021-03-15 DIAGNOSIS — K86.89 PANCREATIC MASS: ICD-10-CM

## 2021-03-15 LAB
ALBUMIN SERPL BCP-MCNC: 2.8 G/DL (ref 3.5–5.2)
ALBUMIN SERPL BCP-MCNC: 3.3 G/DL (ref 3.5–5.2)
ALP SERPL-CCNC: 76 U/L (ref 55–135)
ALP SERPL-CCNC: 87 U/L (ref 55–135)
ALT SERPL W/O P-5'-P-CCNC: 12 U/L (ref 10–44)
ALT SERPL W/O P-5'-P-CCNC: 22 U/L (ref 10–44)
ANION GAP SERPL CALC-SCNC: 29 MMOL/L (ref 8–16)
ANION GAP SERPL CALC-SCNC: 8 MMOL/L (ref 8–16)
ANISOCYTOSIS BLD QL SMEAR: SLIGHT
AST SERPL-CCNC: 31 U/L (ref 10–40)
AST SERPL-CCNC: 47 U/L (ref 10–40)
BASOPHILS # BLD AUTO: 0.04 K/UL (ref 0–0.2)
BASOPHILS NFR BLD: 0.3 % (ref 0–1.9)
BILIRUB SERPL-MCNC: 0.3 MG/DL (ref 0.1–1)
BILIRUB SERPL-MCNC: 0.5 MG/DL (ref 0.1–1)
BUN SERPL-MCNC: 13 MG/DL (ref 8–23)
BUN SERPL-MCNC: 73 MG/DL (ref 8–23)
BURR CELLS BLD QL SMEAR: ABNORMAL
CALCIUM SERPL-MCNC: 6.9 MG/DL (ref 8.7–10.5)
CALCIUM SERPL-MCNC: 8.3 MG/DL (ref 8.7–10.5)
CHLORIDE SERPL-SCNC: 112 MMOL/L (ref 95–110)
CHLORIDE SERPL-SCNC: 87 MMOL/L (ref 95–110)
CK SERPL-CCNC: 86 U/L (ref 20–180)
CO2 SERPL-SCNC: 18 MMOL/L (ref 23–29)
CO2 SERPL-SCNC: 24 MMOL/L (ref 23–29)
CREAT SERPL-MCNC: 0.8 MG/DL (ref 0.5–1.4)
CREAT SERPL-MCNC: 14.4 MG/DL (ref 0.5–1.4)
CTP QC/QA: YES
DIFFERENTIAL METHOD: ABNORMAL
EOSINOPHIL # BLD AUTO: 0.1 K/UL (ref 0–0.5)
EOSINOPHIL NFR BLD: 0.4 % (ref 0–8)
ERYTHROCYTE [DISTWIDTH] IN BLOOD BY AUTOMATED COUNT: 14.9 % (ref 11.5–14.5)
EST. GFR  (AFRICAN AMERICAN): 3 ML/MIN/1.73 M^2
EST. GFR  (AFRICAN AMERICAN): >60 ML/MIN/1.73 M^2
EST. GFR  (NON AFRICAN AMERICAN): 2 ML/MIN/1.73 M^2
EST. GFR  (NON AFRICAN AMERICAN): >60 ML/MIN/1.73 M^2
GLUCOSE SERPL-MCNC: 102 MG/DL (ref 70–110)
GLUCOSE SERPL-MCNC: 117 MG/DL (ref 70–110)
HCT VFR BLD AUTO: 41.6 % (ref 37–48.5)
HGB BLD-MCNC: 12.7 G/DL (ref 12–16)
HYPOCHROMIA BLD QL SMEAR: ABNORMAL
IMM GRANULOCYTES # BLD AUTO: 0.04 K/UL (ref 0–0.04)
IMM GRANULOCYTES NFR BLD AUTO: 0.3 % (ref 0–0.5)
LACTATE SERPL-SCNC: 1.3 MMOL/L (ref 0.5–2.2)
LIPASE SERPL-CCNC: 17 U/L (ref 4–60)
LYMPHOCYTES # BLD AUTO: 1.9 K/UL (ref 1–4.8)
LYMPHOCYTES NFR BLD: 16.9 % (ref 18–48)
MAGNESIUM SERPL-MCNC: 2.2 MG/DL (ref 1.6–2.6)
MCH RBC QN AUTO: 25.7 PG (ref 27–31)
MCHC RBC AUTO-ENTMCNC: 30.5 G/DL (ref 32–36)
MCV RBC AUTO: 84 FL (ref 82–98)
MONOCYTES # BLD AUTO: 0.5 K/UL (ref 0.3–1)
MONOCYTES NFR BLD: 4.5 % (ref 4–15)
NEUTROPHILS # BLD AUTO: 8.9 K/UL (ref 1.8–7.7)
NEUTROPHILS NFR BLD: 77.6 % (ref 38–73)
NRBC BLD-RTO: 0 /100 WBC
OVALOCYTES BLD QL SMEAR: ABNORMAL
PHOSPHATE SERPL-MCNC: 2.9 MG/DL (ref 2.7–4.5)
PLATELET # BLD AUTO: 308 K/UL (ref 150–350)
PLATELET BLD QL SMEAR: ABNORMAL
PMV BLD AUTO: 10.7 FL (ref 9.2–12.9)
POIKILOCYTOSIS BLD QL SMEAR: SLIGHT
POTASSIUM SERPL-SCNC: 3.2 MMOL/L (ref 3.5–5.1)
POTASSIUM SERPL-SCNC: 4.4 MMOL/L (ref 3.5–5.1)
PROT SERPL-MCNC: 6.9 G/DL (ref 6–8.4)
PROT SERPL-MCNC: 7.6 G/DL (ref 6–8.4)
RBC # BLD AUTO: 4.95 M/UL (ref 4–5.4)
SARS-COV-2 RDRP RESP QL NAA+PROBE: NEGATIVE
SODIUM SERPL-SCNC: 134 MMOL/L (ref 136–145)
SODIUM SERPL-SCNC: 144 MMOL/L (ref 136–145)
TROPONIN I SERPL DL<=0.01 NG/ML-MCNC: 0.01 NG/ML (ref 0–0.03)
TROPONIN I SERPL DL<=0.01 NG/ML-MCNC: 0.17 NG/ML (ref 0–0.03)
WBC # BLD AUTO: 11.45 K/UL (ref 3.9–12.7)

## 2021-03-15 PROCEDURE — 80053 COMPREHEN METABOLIC PANEL: CPT | Mod: 91 | Performed by: STUDENT IN AN ORGANIZED HEALTH CARE EDUCATION/TRAINING PROGRAM

## 2021-03-15 PROCEDURE — 83735 ASSAY OF MAGNESIUM: CPT | Performed by: PHYSICIAN ASSISTANT

## 2021-03-15 PROCEDURE — 83690 ASSAY OF LIPASE: CPT | Performed by: PHYSICIAN ASSISTANT

## 2021-03-15 PROCEDURE — 71045 XR CHEST AP PORTABLE: ICD-10-PCS | Mod: 26,76,, | Performed by: RADIOLOGY

## 2021-03-15 PROCEDURE — 96375 TX/PRO/DX INJ NEW DRUG ADDON: CPT

## 2021-03-15 PROCEDURE — 63600175 PHARM REV CODE 636 W HCPCS: Performed by: STUDENT IN AN ORGANIZED HEALTH CARE EDUCATION/TRAINING PROGRAM

## 2021-03-15 PROCEDURE — G0378 HOSPITAL OBSERVATION PER HR: HCPCS

## 2021-03-15 PROCEDURE — 99291 CRITICAL CARE FIRST HOUR: CPT | Mod: 25

## 2021-03-15 PROCEDURE — U0002 COVID-19 LAB TEST NON-CDC: HCPCS | Performed by: EMERGENCY MEDICINE

## 2021-03-15 PROCEDURE — 84100 ASSAY OF PHOSPHORUS: CPT | Performed by: PHYSICIAN ASSISTANT

## 2021-03-15 PROCEDURE — 83605 ASSAY OF LACTIC ACID: CPT | Performed by: STUDENT IN AN ORGANIZED HEALTH CARE EDUCATION/TRAINING PROGRAM

## 2021-03-15 PROCEDURE — 84484 ASSAY OF TROPONIN QUANT: CPT | Mod: 91 | Performed by: STUDENT IN AN ORGANIZED HEALTH CARE EDUCATION/TRAINING PROGRAM

## 2021-03-15 PROCEDURE — 25000003 PHARM REV CODE 250: Performed by: PHYSICIAN ASSISTANT

## 2021-03-15 PROCEDURE — 25000003 PHARM REV CODE 250: Performed by: EMERGENCY MEDICINE

## 2021-03-15 PROCEDURE — 63600175 PHARM REV CODE 636 W HCPCS: Performed by: PHYSICIAN ASSISTANT

## 2021-03-15 PROCEDURE — 96361 HYDRATE IV INFUSION ADD-ON: CPT

## 2021-03-15 PROCEDURE — 71045 X-RAY EXAM CHEST 1 VIEW: CPT | Mod: TC,FY

## 2021-03-15 PROCEDURE — 93010 EKG 12-LEAD: ICD-10-PCS | Mod: ,,, | Performed by: INTERNAL MEDICINE

## 2021-03-15 PROCEDURE — 96372 THER/PROPH/DIAG INJ SC/IM: CPT | Mod: 59

## 2021-03-15 PROCEDURE — 82550 ASSAY OF CK (CPK): CPT | Performed by: STUDENT IN AN ORGANIZED HEALTH CARE EDUCATION/TRAINING PROGRAM

## 2021-03-15 PROCEDURE — 71045 X-RAY EXAM CHEST 1 VIEW: CPT | Mod: 26,,, | Performed by: RADIOLOGY

## 2021-03-15 PROCEDURE — 84484 ASSAY OF TROPONIN QUANT: CPT | Performed by: EMERGENCY MEDICINE

## 2021-03-15 PROCEDURE — 63600175 PHARM REV CODE 636 W HCPCS: Performed by: EMERGENCY MEDICINE

## 2021-03-15 PROCEDURE — 71045 XR CHEST AP PORTABLE: ICD-10-PCS | Mod: 26,,, | Performed by: RADIOLOGY

## 2021-03-15 PROCEDURE — 71045 X-RAY EXAM CHEST 1 VIEW: CPT | Mod: 26,76,, | Performed by: RADIOLOGY

## 2021-03-15 PROCEDURE — 93005 ELECTROCARDIOGRAM TRACING: CPT | Mod: 59

## 2021-03-15 PROCEDURE — 93010 ELECTROCARDIOGRAM REPORT: CPT | Mod: ,,, | Performed by: INTERNAL MEDICINE

## 2021-03-15 PROCEDURE — 85025 COMPLETE CBC W/AUTO DIFF WBC: CPT | Performed by: PHYSICIAN ASSISTANT

## 2021-03-15 PROCEDURE — 80053 COMPREHEN METABOLIC PANEL: CPT | Performed by: PHYSICIAN ASSISTANT

## 2021-03-15 PROCEDURE — 96374 THER/PROPH/DIAG INJ IV PUSH: CPT | Mod: 59

## 2021-03-15 RX ORDER — FAMOTIDINE 10 MG/ML
40 INJECTION INTRAVENOUS ONCE
Status: COMPLETED | OUTPATIENT
Start: 2021-03-15 | End: 2021-03-15

## 2021-03-15 RX ORDER — MAG HYDROX/ALUMINUM HYD/SIMETH 200-200-20
30 SUSPENSION, ORAL (FINAL DOSE FORM) ORAL
Status: COMPLETED | OUTPATIENT
Start: 2021-03-15 | End: 2021-03-15

## 2021-03-15 RX ORDER — ONDANSETRON 4 MG/1
4 TABLET, ORALLY DISINTEGRATING ORAL
Status: DISCONTINUED | OUTPATIENT
Start: 2021-03-15 | End: 2021-03-15

## 2021-03-15 RX ORDER — IBUPROFEN 200 MG
24 TABLET ORAL
Status: DISCONTINUED | OUTPATIENT
Start: 2021-03-15 | End: 2021-03-16 | Stop reason: HOSPADM

## 2021-03-15 RX ORDER — ONDANSETRON 8 MG/1
8 TABLET, ORALLY DISINTEGRATING ORAL EVERY 8 HOURS PRN
Status: DISCONTINUED | OUTPATIENT
Start: 2021-03-15 | End: 2021-03-16 | Stop reason: HOSPADM

## 2021-03-15 RX ORDER — SODIUM CHLORIDE 0.9 % (FLUSH) 0.9 %
10 SYRINGE (ML) INJECTION
Status: DISCONTINUED | OUTPATIENT
Start: 2021-03-15 | End: 2021-03-16 | Stop reason: HOSPADM

## 2021-03-15 RX ORDER — IBUPROFEN 200 MG
16 TABLET ORAL
Status: DISCONTINUED | OUTPATIENT
Start: 2021-03-15 | End: 2021-03-16 | Stop reason: HOSPADM

## 2021-03-15 RX ORDER — ONDANSETRON 2 MG/ML
4 INJECTION INTRAMUSCULAR; INTRAVENOUS
Status: COMPLETED | OUTPATIENT
Start: 2021-03-15 | End: 2021-03-15

## 2021-03-15 RX ORDER — HEPARIN SODIUM 5000 [USP'U]/ML
5000 INJECTION, SOLUTION INTRAVENOUS; SUBCUTANEOUS EVERY 8 HOURS
Status: DISCONTINUED | OUTPATIENT
Start: 2021-03-15 | End: 2021-03-16 | Stop reason: HOSPADM

## 2021-03-15 RX ORDER — OXYCODONE AND ACETAMINOPHEN 7.5; 325 MG/1; MG/1
1 TABLET ORAL EVERY 6 HOURS PRN
Status: DISCONTINUED | OUTPATIENT
Start: 2021-03-15 | End: 2021-03-16 | Stop reason: HOSPADM

## 2021-03-15 RX ORDER — TALC
6 POWDER (GRAM) TOPICAL NIGHTLY PRN
Status: DISCONTINUED | OUTPATIENT
Start: 2021-03-15 | End: 2021-03-16 | Stop reason: HOSPADM

## 2021-03-15 RX ADMIN — HEPARIN SODIUM 5000 UNITS: 5000 INJECTION INTRAVENOUS; SUBCUTANEOUS at 10:03

## 2021-03-15 RX ADMIN — ONDANSETRON 4 MG: 2 INJECTION INTRAMUSCULAR; INTRAVENOUS at 07:03

## 2021-03-15 RX ADMIN — ALUMINUM HYDROXIDE, MAGNESIUM HYDROXIDE, AND SIMETHICONE 30 ML: 200; 200; 20 SUSPENSION ORAL at 07:03

## 2021-03-15 RX ADMIN — FAMOTIDINE 40 MG: 10 INJECTION, SOLUTION INTRAVENOUS at 07:03

## 2021-03-15 RX ADMIN — SODIUM CHLORIDE 1000 ML: 0.9 INJECTION, SOLUTION INTRAVENOUS at 07:03

## 2021-03-15 RX ADMIN — PROMETHAZINE HYDROCHLORIDE 12.5 MG: 25 INJECTION INTRAMUSCULAR; INTRAVENOUS at 07:03

## 2021-03-16 VITALS
BODY MASS INDEX: 21.11 KG/M2 | WEIGHT: 155.88 LBS | TEMPERATURE: 98 F | DIASTOLIC BLOOD PRESSURE: 77 MMHG | HEIGHT: 72 IN | SYSTOLIC BLOOD PRESSURE: 124 MMHG | RESPIRATION RATE: 17 BRPM | OXYGEN SATURATION: 99 % | HEART RATE: 77 BPM

## 2021-03-16 PROBLEM — N17.9 ACUTE KIDNEY FAILURE: Status: RESOLVED | Noted: 2021-03-15 | Resolved: 2021-03-16

## 2021-03-16 LAB
ALBUMIN SERPL BCP-MCNC: 3.2 G/DL (ref 3.5–5.2)
ALP SERPL-CCNC: 85 U/L (ref 55–135)
ALT SERPL W/O P-5'-P-CCNC: 9 U/L (ref 10–44)
ANION GAP SERPL CALC-SCNC: 7 MMOL/L (ref 8–16)
AST SERPL-CCNC: 23 U/L (ref 10–40)
BASOPHILS # BLD AUTO: 0.03 K/UL (ref 0–0.2)
BASOPHILS NFR BLD: 0.3 % (ref 0–1.9)
BILIRUB SERPL-MCNC: 0.4 MG/DL (ref 0.1–1)
BILIRUB UR QL STRIP: NEGATIVE
BUN SERPL-MCNC: 11 MG/DL (ref 8–23)
CALCIUM SERPL-MCNC: 8.3 MG/DL (ref 8.7–10.5)
CHLORIDE SERPL-SCNC: 113 MMOL/L (ref 95–110)
CLARITY UR: CLEAR
CO2 SERPL-SCNC: 24 MMOL/L (ref 23–29)
COLOR UR: NORMAL
CREAT SERPL-MCNC: 0.8 MG/DL (ref 0.5–1.4)
DIFFERENTIAL METHOD: ABNORMAL
EOSINOPHIL # BLD AUTO: 0.1 K/UL (ref 0–0.5)
EOSINOPHIL NFR BLD: 0.9 % (ref 0–8)
ERYTHROCYTE [DISTWIDTH] IN BLOOD BY AUTOMATED COUNT: 14.7 % (ref 11.5–14.5)
EST. GFR  (AFRICAN AMERICAN): >60 ML/MIN/1.73 M^2
EST. GFR  (NON AFRICAN AMERICAN): >60 ML/MIN/1.73 M^2
GLUCOSE SERPL-MCNC: 79 MG/DL (ref 70–110)
GLUCOSE UR QL STRIP: NEGATIVE
HCT VFR BLD AUTO: 36.7 % (ref 37–48.5)
HGB BLD-MCNC: 11.5 G/DL (ref 12–16)
HGB UR QL STRIP: NEGATIVE
IMM GRANULOCYTES # BLD AUTO: 0.03 K/UL (ref 0–0.04)
IMM GRANULOCYTES NFR BLD AUTO: 0.3 % (ref 0–0.5)
KETONES UR QL STRIP: NEGATIVE
LEUKOCYTE ESTERASE UR QL STRIP: NEGATIVE
LYMPHOCYTES # BLD AUTO: 2.9 K/UL (ref 1–4.8)
LYMPHOCYTES NFR BLD: 30.5 % (ref 18–48)
MAGNESIUM SERPL-MCNC: 2.2 MG/DL (ref 1.6–2.6)
MCH RBC QN AUTO: 25.7 PG (ref 27–31)
MCHC RBC AUTO-ENTMCNC: 31.3 G/DL (ref 32–36)
MCV RBC AUTO: 82 FL (ref 82–98)
MONOCYTES # BLD AUTO: 0.5 K/UL (ref 0.3–1)
MONOCYTES NFR BLD: 4.8 % (ref 4–15)
NEUTROPHILS # BLD AUTO: 6 K/UL (ref 1.8–7.7)
NEUTROPHILS NFR BLD: 63.2 % (ref 38–73)
NITRITE UR QL STRIP: NEGATIVE
NRBC BLD-RTO: 0 /100 WBC
PH UR STRIP: 7 [PH] (ref 5–8)
PHOSPHATE SERPL-MCNC: 2.6 MG/DL (ref 2.7–4.5)
PLATELET # BLD AUTO: 276 K/UL (ref 150–350)
PMV BLD AUTO: 10.6 FL (ref 9.2–12.9)
POTASSIUM SERPL-SCNC: 3.7 MMOL/L (ref 3.5–5.1)
PROT SERPL-MCNC: 6.6 G/DL (ref 6–8.4)
PROT UR QL STRIP: NEGATIVE
RBC # BLD AUTO: 4.48 M/UL (ref 4–5.4)
SODIUM SERPL-SCNC: 144 MMOL/L (ref 136–145)
SP GR UR STRIP: 1.02 (ref 1–1.03)
TROPONIN I SERPL DL<=0.01 NG/ML-MCNC: 0.01 NG/ML (ref 0–0.03)
URN SPEC COLLECT METH UR: NORMAL
UROBILINOGEN UR STRIP-ACNC: 1 EU/DL
WBC # BLD AUTO: 9.41 K/UL (ref 3.9–12.7)

## 2021-03-16 PROCEDURE — 83735 ASSAY OF MAGNESIUM: CPT | Performed by: STUDENT IN AN ORGANIZED HEALTH CARE EDUCATION/TRAINING PROGRAM

## 2021-03-16 PROCEDURE — 84484 ASSAY OF TROPONIN QUANT: CPT | Performed by: STUDENT IN AN ORGANIZED HEALTH CARE EDUCATION/TRAINING PROGRAM

## 2021-03-16 PROCEDURE — 81003 URINALYSIS AUTO W/O SCOPE: CPT | Performed by: STUDENT IN AN ORGANIZED HEALTH CARE EDUCATION/TRAINING PROGRAM

## 2021-03-16 PROCEDURE — G0378 HOSPITAL OBSERVATION PER HR: HCPCS

## 2021-03-16 PROCEDURE — 63600175 PHARM REV CODE 636 W HCPCS: Performed by: STUDENT IN AN ORGANIZED HEALTH CARE EDUCATION/TRAINING PROGRAM

## 2021-03-16 PROCEDURE — 84100 ASSAY OF PHOSPHORUS: CPT | Performed by: STUDENT IN AN ORGANIZED HEALTH CARE EDUCATION/TRAINING PROGRAM

## 2021-03-16 PROCEDURE — 99220 PR INITIAL OBSERVATION CARE,LEVL III: ICD-10-PCS | Mod: ,,, | Performed by: INTERNAL MEDICINE

## 2021-03-16 PROCEDURE — 80053 COMPREHEN METABOLIC PANEL: CPT | Performed by: STUDENT IN AN ORGANIZED HEALTH CARE EDUCATION/TRAINING PROGRAM

## 2021-03-16 PROCEDURE — 85025 COMPLETE CBC W/AUTO DIFF WBC: CPT | Performed by: STUDENT IN AN ORGANIZED HEALTH CARE EDUCATION/TRAINING PROGRAM

## 2021-03-16 PROCEDURE — 99220 PR INITIAL OBSERVATION CARE,LEVL III: CPT | Mod: ,,, | Performed by: INTERNAL MEDICINE

## 2021-03-16 PROCEDURE — 96372 THER/PROPH/DIAG INJ SC/IM: CPT

## 2021-03-16 RX ADMIN — HEPARIN SODIUM 5000 UNITS: 5000 INJECTION INTRAVENOUS; SUBCUTANEOUS at 05:03

## 2021-03-25 ENCOUNTER — TELEPHONE (OUTPATIENT)
Dept: OTOLARYNGOLOGY | Facility: CLINIC | Age: 76
End: 2021-03-25

## 2021-03-25 ENCOUNTER — OFFICE VISIT (OUTPATIENT)
Dept: GASTROENTEROLOGY | Facility: CLINIC | Age: 76
End: 2021-03-25
Payer: MEDICARE

## 2021-03-25 VITALS
BODY MASS INDEX: 20.01 KG/M2 | HEIGHT: 72 IN | DIASTOLIC BLOOD PRESSURE: 68 MMHG | HEART RATE: 81 BPM | SYSTOLIC BLOOD PRESSURE: 102 MMHG

## 2021-03-25 DIAGNOSIS — K85.00 IDIOPATHIC ACUTE PANCREATITIS WITHOUT INFECTION OR NECROSIS: ICD-10-CM

## 2021-03-25 DIAGNOSIS — C34.90 MALIGNANT NEOPLASM OF LUNG, UNSPECIFIED LATERALITY, UNSPECIFIED PART OF LUNG: Primary | ICD-10-CM

## 2021-03-25 PROCEDURE — 3288F PR FALLS RISK ASSESSMENT DOCUMENTED: ICD-10-PCS | Mod: CPTII,S$GLB,, | Performed by: INTERNAL MEDICINE

## 2021-03-25 PROCEDURE — 1159F PR MEDICATION LIST DOCUMENTED IN MEDICAL RECORD: ICD-10-PCS | Mod: S$GLB,,, | Performed by: INTERNAL MEDICINE

## 2021-03-25 PROCEDURE — 99999 PR PBB SHADOW E&M-EST. PATIENT-LVL III: CPT | Mod: PBBFAC,,, | Performed by: INTERNAL MEDICINE

## 2021-03-25 PROCEDURE — 99203 OFFICE O/P NEW LOW 30 MIN: CPT | Mod: S$GLB,,, | Performed by: INTERNAL MEDICINE

## 2021-03-25 PROCEDURE — 3288F FALL RISK ASSESSMENT DOCD: CPT | Mod: CPTII,S$GLB,, | Performed by: INTERNAL MEDICINE

## 2021-03-25 PROCEDURE — 1126F PR PAIN SEVERITY QUANTIFIED, NO PAIN PRESENT: ICD-10-PCS | Mod: S$GLB,,, | Performed by: INTERNAL MEDICINE

## 2021-03-25 PROCEDURE — 1159F MED LIST DOCD IN RCRD: CPT | Mod: S$GLB,,, | Performed by: INTERNAL MEDICINE

## 2021-03-25 PROCEDURE — 1101F PR PT FALLS ASSESS DOC 0-1 FALLS W/OUT INJ PAST YR: ICD-10-PCS | Mod: CPTII,S$GLB,, | Performed by: INTERNAL MEDICINE

## 2021-03-25 PROCEDURE — 99999 PR PBB SHADOW E&M-EST. PATIENT-LVL III: ICD-10-PCS | Mod: PBBFAC,,, | Performed by: INTERNAL MEDICINE

## 2021-03-25 PROCEDURE — 99203 PR OFFICE/OUTPT VISIT, NEW, LEVL III, 30-44 MIN: ICD-10-PCS | Mod: S$GLB,,, | Performed by: INTERNAL MEDICINE

## 2021-03-25 PROCEDURE — 1126F AMNT PAIN NOTED NONE PRSNT: CPT | Mod: S$GLB,,, | Performed by: INTERNAL MEDICINE

## 2021-03-25 PROCEDURE — 1101F PT FALLS ASSESS-DOCD LE1/YR: CPT | Mod: CPTII,S$GLB,, | Performed by: INTERNAL MEDICINE

## 2021-03-29 ENCOUNTER — TELEPHONE (OUTPATIENT)
Dept: HEMATOLOGY/ONCOLOGY | Facility: CLINIC | Age: 76
End: 2021-03-29

## 2021-05-03 ENCOUNTER — OFFICE VISIT (OUTPATIENT)
Dept: HEMATOLOGY/ONCOLOGY | Facility: CLINIC | Age: 76
End: 2021-05-03
Payer: MEDICARE

## 2021-05-03 VITALS
WEIGHT: 139.13 LBS | OXYGEN SATURATION: 98 % | HEART RATE: 60 BPM | SYSTOLIC BLOOD PRESSURE: 104 MMHG | BODY MASS INDEX: 17.86 KG/M2 | DIASTOLIC BLOOD PRESSURE: 64 MMHG | RESPIRATION RATE: 18 BRPM

## 2021-05-03 DIAGNOSIS — K85.00 IDIOPATHIC ACUTE PANCREATITIS WITHOUT INFECTION OR NECROSIS: ICD-10-CM

## 2021-05-03 DIAGNOSIS — C34.82 MALIGNANT NEOPLASM OF OVERLAPPING SITES OF LEFT BRONCHUS AND LUNG: ICD-10-CM

## 2021-05-03 DIAGNOSIS — C34.92 PRIMARY LUNG ADENOCARCINOMA, LEFT: ICD-10-CM

## 2021-05-03 PROCEDURE — 99999 PR PBB SHADOW E&M-EST. PATIENT-LVL IV: CPT | Mod: PBBFAC,,, | Performed by: INTERNAL MEDICINE

## 2021-05-03 PROCEDURE — 99999 PR PBB SHADOW E&M-EST. PATIENT-LVL IV: ICD-10-PCS | Mod: PBBFAC,,, | Performed by: INTERNAL MEDICINE

## 2021-05-03 PROCEDURE — 99205 OFFICE O/P NEW HI 60 MIN: CPT | Mod: S$GLB,,, | Performed by: INTERNAL MEDICINE

## 2021-05-03 PROCEDURE — 1159F MED LIST DOCD IN RCRD: CPT | Mod: S$GLB,,, | Performed by: INTERNAL MEDICINE

## 2021-05-03 PROCEDURE — 3288F PR FALLS RISK ASSESSMENT DOCUMENTED: ICD-10-PCS | Mod: CPTII,S$GLB,, | Performed by: INTERNAL MEDICINE

## 2021-05-03 PROCEDURE — 1101F PT FALLS ASSESS-DOCD LE1/YR: CPT | Mod: CPTII,S$GLB,, | Performed by: INTERNAL MEDICINE

## 2021-05-03 PROCEDURE — 99205 PR OFFICE/OUTPT VISIT, NEW, LEVL V, 60-74 MIN: ICD-10-PCS | Mod: S$GLB,,, | Performed by: INTERNAL MEDICINE

## 2021-05-03 PROCEDURE — 1101F PR PT FALLS ASSESS DOC 0-1 FALLS W/OUT INJ PAST YR: ICD-10-PCS | Mod: CPTII,S$GLB,, | Performed by: INTERNAL MEDICINE

## 2021-05-03 PROCEDURE — 1159F PR MEDICATION LIST DOCUMENTED IN MEDICAL RECORD: ICD-10-PCS | Mod: S$GLB,,, | Performed by: INTERNAL MEDICINE

## 2021-05-03 PROCEDURE — 3288F FALL RISK ASSESSMENT DOCD: CPT | Mod: CPTII,S$GLB,, | Performed by: INTERNAL MEDICINE

## 2021-05-03 PROCEDURE — 1126F AMNT PAIN NOTED NONE PRSNT: CPT | Mod: S$GLB,,, | Performed by: INTERNAL MEDICINE

## 2021-05-03 PROCEDURE — 1126F PR PAIN SEVERITY QUANTIFIED, NO PAIN PRESENT: ICD-10-PCS | Mod: S$GLB,,, | Performed by: INTERNAL MEDICINE

## 2021-05-17 ENCOUNTER — HOSPITAL ENCOUNTER (OUTPATIENT)
Dept: RADIOLOGY | Facility: HOSPITAL | Age: 76
Discharge: HOME | End: 2021-05-17
Attending: INTERNAL MEDICINE
Payer: MEDICARE

## 2021-05-17 DIAGNOSIS — C34.82 MALIGNANT NEOPLASM OF OVERLAPPING SITES OF LEFT BRONCHUS AND LUNG: ICD-10-CM

## 2021-05-17 DIAGNOSIS — C34.92 PRIMARY LUNG ADENOCARCINOMA, LEFT: ICD-10-CM

## 2021-05-17 PROCEDURE — 78815 PET IMAGE W/CT SKULL-THIGH: CPT | Mod: TC,PI

## 2021-05-17 PROCEDURE — 70553 MRI BRAIN STEM W/O & W/DYE: CPT | Mod: TC

## 2021-05-17 PROCEDURE — 70553 MRI BRAIN W WO CONTRAST: ICD-10-PCS | Mod: 26,,, | Performed by: RADIOLOGY

## 2021-05-17 PROCEDURE — 70553 MRI BRAIN STEM W/O & W/DYE: CPT | Mod: 26,,, | Performed by: RADIOLOGY

## 2021-05-17 PROCEDURE — 78815 PET IMAGE W/CT SKULL-THIGH: CPT | Mod: 26,PS,, | Performed by: RADIOLOGY

## 2021-05-17 PROCEDURE — A9585 GADOBUTROL INJECTION: HCPCS | Performed by: INTERNAL MEDICINE

## 2021-05-17 PROCEDURE — 78815 NM PET CT ROUTINE: ICD-10-PCS | Mod: 26,PS,, | Performed by: RADIOLOGY

## 2021-05-17 PROCEDURE — 25500020 PHARM REV CODE 255: Performed by: INTERNAL MEDICINE

## 2021-05-17 RX ORDER — GADOBUTROL 604.72 MG/ML
6 INJECTION INTRAVENOUS
Status: COMPLETED | OUTPATIENT
Start: 2021-05-17 | End: 2021-05-17

## 2021-05-17 RX ADMIN — GADOBUTROL 6 ML: 604.72 INJECTION INTRAVENOUS at 02:05

## 2021-05-24 ENCOUNTER — OFFICE VISIT (OUTPATIENT)
Dept: HEMATOLOGY/ONCOLOGY | Facility: CLINIC | Age: 76
End: 2021-05-24
Payer: MEDICARE

## 2021-05-24 ENCOUNTER — SPECIALTY PHARMACY (OUTPATIENT)
Dept: PHARMACY | Facility: CLINIC | Age: 76
End: 2021-05-24

## 2021-05-24 VITALS
OXYGEN SATURATION: 97 % | BODY MASS INDEX: 18.09 KG/M2 | SYSTOLIC BLOOD PRESSURE: 102 MMHG | HEART RATE: 51 BPM | DIASTOLIC BLOOD PRESSURE: 64 MMHG | WEIGHT: 140.88 LBS | RESPIRATION RATE: 18 BRPM

## 2021-05-24 DIAGNOSIS — M94.9 DISORDER OF CARTILAGE, UNSPECIFIED: ICD-10-CM

## 2021-05-24 DIAGNOSIS — C34.92 ADENOCARCINOMA OF LEFT LUNG, STAGE 4: Primary | ICD-10-CM

## 2021-05-24 DIAGNOSIS — C34.90 LUNG CANCER METASTATIC TO BRAIN: ICD-10-CM

## 2021-05-24 DIAGNOSIS — F02.80 DEMENTIA DUE TO HEAD TRAUMA WITHOUT BEHAVIORAL DISTURBANCE: ICD-10-CM

## 2021-05-24 DIAGNOSIS — C79.31 LUNG CANCER METASTATIC TO BRAIN: ICD-10-CM

## 2021-05-24 DIAGNOSIS — R90.89 OTHER ABNORMAL FINDINGS ON DIAGNOSTIC IMAGING OF CENTRAL NERVOUS SYSTEM: ICD-10-CM

## 2021-05-24 DIAGNOSIS — K86.1 IDIOPATHIC CHRONIC PANCREATITIS: ICD-10-CM

## 2021-05-24 DIAGNOSIS — S09.90XS DEMENTIA DUE TO HEAD TRAUMA WITHOUT BEHAVIORAL DISTURBANCE: ICD-10-CM

## 2021-05-24 PROBLEM — R91.8 MASS OF LOWER LOBE OF LEFT LUNG: Status: RESOLVED | Noted: 2021-03-15 | Resolved: 2021-05-24

## 2021-05-24 PROBLEM — R91.1 NODULE OF LOWER LOBE OF LEFT LUNG: Status: RESOLVED | Noted: 2021-02-21 | Resolved: 2021-05-24

## 2021-05-24 PROCEDURE — 1101F PR PT FALLS ASSESS DOC 0-1 FALLS W/OUT INJ PAST YR: ICD-10-PCS | Mod: CPTII,S$GLB,, | Performed by: INTERNAL MEDICINE

## 2021-05-24 PROCEDURE — 99999 PR PBB SHADOW E&M-EST. PATIENT-LVL III: CPT | Mod: PBBFAC,,, | Performed by: INTERNAL MEDICINE

## 2021-05-24 PROCEDURE — 1126F AMNT PAIN NOTED NONE PRSNT: CPT | Mod: S$GLB,,, | Performed by: INTERNAL MEDICINE

## 2021-05-24 PROCEDURE — 1159F PR MEDICATION LIST DOCUMENTED IN MEDICAL RECORD: ICD-10-PCS | Mod: S$GLB,,, | Performed by: INTERNAL MEDICINE

## 2021-05-24 PROCEDURE — 1159F MED LIST DOCD IN RCRD: CPT | Mod: S$GLB,,, | Performed by: INTERNAL MEDICINE

## 2021-05-24 PROCEDURE — 99215 OFFICE O/P EST HI 40 MIN: CPT | Mod: S$GLB,,, | Performed by: INTERNAL MEDICINE

## 2021-05-24 PROCEDURE — 99999 PR PBB SHADOW E&M-EST. PATIENT-LVL III: ICD-10-PCS | Mod: PBBFAC,,, | Performed by: INTERNAL MEDICINE

## 2021-05-24 PROCEDURE — 1126F PR PAIN SEVERITY QUANTIFIED, NO PAIN PRESENT: ICD-10-PCS | Mod: S$GLB,,, | Performed by: INTERNAL MEDICINE

## 2021-05-24 PROCEDURE — 3288F FALL RISK ASSESSMENT DOCD: CPT | Mod: CPTII,S$GLB,, | Performed by: INTERNAL MEDICINE

## 2021-05-24 PROCEDURE — 1101F PT FALLS ASSESS-DOCD LE1/YR: CPT | Mod: CPTII,S$GLB,, | Performed by: INTERNAL MEDICINE

## 2021-05-24 PROCEDURE — 99215 PR OFFICE/OUTPT VISIT, EST, LEVL V, 40-54 MIN: ICD-10-PCS | Mod: S$GLB,,, | Performed by: INTERNAL MEDICINE

## 2021-05-24 PROCEDURE — 3288F PR FALLS RISK ASSESSMENT DOCUMENTED: ICD-10-PCS | Mod: CPTII,S$GLB,, | Performed by: INTERNAL MEDICINE

## 2021-05-26 ENCOUNTER — SPECIALTY PHARMACY (OUTPATIENT)
Dept: PHARMACY | Facility: CLINIC | Age: 76
End: 2021-05-26

## 2021-05-26 ENCOUNTER — TELEPHONE (OUTPATIENT)
Dept: PHARMACY | Facility: CLINIC | Age: 76
End: 2021-05-26

## 2021-05-26 RX ORDER — DICLOFENAC EPOLAMINE 0.01 G/1
1 SYSTEM TOPICAL DAILY PRN
COMMUNITY
End: 2023-04-02

## 2021-05-28 ENCOUNTER — TELEPHONE (OUTPATIENT)
Dept: HEMATOLOGY/ONCOLOGY | Facility: CLINIC | Age: 76
End: 2021-05-28

## 2021-05-28 DIAGNOSIS — R11.2 CHEMOTHERAPY-INDUCED NAUSEA AND VOMITING: Primary | ICD-10-CM

## 2021-05-28 DIAGNOSIS — T45.1X5A CHEMOTHERAPY-INDUCED NAUSEA AND VOMITING: Primary | ICD-10-CM

## 2021-05-28 RX ORDER — PROMETHAZINE HYDROCHLORIDE 25 MG/1
25 TABLET ORAL EVERY 8 HOURS PRN
Qty: 40 TABLET | Refills: 5 | Status: SHIPPED | OUTPATIENT
Start: 2021-05-28 | End: 2022-01-24 | Stop reason: SDUPTHER

## 2021-06-01 ENCOUNTER — OFFICE VISIT (OUTPATIENT)
Dept: RADIATION ONCOLOGY | Facility: CLINIC | Age: 76
End: 2021-06-01
Payer: MEDICARE

## 2021-06-01 VITALS
OXYGEN SATURATION: 98 % | HEART RATE: 68 BPM | WEIGHT: 136.13 LBS | BODY MASS INDEX: 18.44 KG/M2 | TEMPERATURE: 97 F | HEIGHT: 72 IN | RESPIRATION RATE: 16 BRPM | DIASTOLIC BLOOD PRESSURE: 51 MMHG | SYSTOLIC BLOOD PRESSURE: 94 MMHG

## 2021-06-01 DIAGNOSIS — C34.92 ADENOCARCINOMA OF LEFT LUNG, STAGE 4: Primary | ICD-10-CM

## 2021-06-01 DIAGNOSIS — C79.31 LUNG CANCER METASTATIC TO BRAIN: ICD-10-CM

## 2021-06-01 DIAGNOSIS — C34.90 LUNG CANCER METASTATIC TO BRAIN: ICD-10-CM

## 2021-06-01 PROCEDURE — 99999 PR PBB SHADOW E&M-EST. PATIENT-LVL III: CPT | Mod: PBBFAC,,, | Performed by: RADIOLOGY

## 2021-06-01 PROCEDURE — 3288F PR FALLS RISK ASSESSMENT DOCUMENTED: ICD-10-PCS | Mod: CPTII,S$GLB,, | Performed by: RADIOLOGY

## 2021-06-01 PROCEDURE — 99999 PR PBB SHADOW E&M-EST. PATIENT-LVL III: ICD-10-PCS | Mod: PBBFAC,,, | Performed by: RADIOLOGY

## 2021-06-01 PROCEDURE — 3288F FALL RISK ASSESSMENT DOCD: CPT | Mod: CPTII,S$GLB,, | Performed by: RADIOLOGY

## 2021-06-01 PROCEDURE — 1159F MED LIST DOCD IN RCRD: CPT | Mod: S$GLB,,, | Performed by: RADIOLOGY

## 2021-06-01 PROCEDURE — 99204 OFFICE O/P NEW MOD 45 MIN: CPT | Mod: S$GLB,,, | Performed by: RADIOLOGY

## 2021-06-01 PROCEDURE — 99204 PR OFFICE/OUTPT VISIT, NEW, LEVL IV, 45-59 MIN: ICD-10-PCS | Mod: S$GLB,,, | Performed by: RADIOLOGY

## 2021-06-01 PROCEDURE — 1101F PT FALLS ASSESS-DOCD LE1/YR: CPT | Mod: CPTII,S$GLB,, | Performed by: RADIOLOGY

## 2021-06-01 PROCEDURE — 1159F PR MEDICATION LIST DOCUMENTED IN MEDICAL RECORD: ICD-10-PCS | Mod: S$GLB,,, | Performed by: RADIOLOGY

## 2021-06-01 PROCEDURE — 1101F PR PT FALLS ASSESS DOC 0-1 FALLS W/OUT INJ PAST YR: ICD-10-PCS | Mod: CPTII,S$GLB,, | Performed by: RADIOLOGY

## 2021-06-04 ENCOUNTER — SPECIALTY PHARMACY (OUTPATIENT)
Dept: PHARMACY | Facility: CLINIC | Age: 76
End: 2021-06-04

## 2021-06-11 ENCOUNTER — LAB VISIT (OUTPATIENT)
Dept: LAB | Facility: HOSPITAL | Age: 76
End: 2021-06-11
Attending: INTERNAL MEDICINE
Payer: MEDICARE

## 2021-06-11 DIAGNOSIS — M94.9 DISORDER OF CARTILAGE, UNSPECIFIED: ICD-10-CM

## 2021-06-11 DIAGNOSIS — C34.92 ADENOCARCINOMA OF LEFT LUNG, STAGE 4: ICD-10-CM

## 2021-06-11 DIAGNOSIS — R90.89 OTHER ABNORMAL FINDINGS ON DIAGNOSTIC IMAGING OF CENTRAL NERVOUS SYSTEM: ICD-10-CM

## 2021-06-11 LAB
ALBUMIN SERPL BCP-MCNC: 4 G/DL (ref 3.5–5.2)
ALP SERPL-CCNC: 105 U/L (ref 38–126)
ALT SERPL W/O P-5'-P-CCNC: <6 U/L (ref 10–44)
ANION GAP SERPL CALC-SCNC: 6 MMOL/L (ref 8–16)
AST SERPL-CCNC: 25 U/L (ref 15–46)
BASOPHILS # BLD AUTO: 0.03 K/UL (ref 0–0.2)
BASOPHILS NFR BLD: 0.5 % (ref 0–1.9)
BILIRUB SERPL-MCNC: 0.3 MG/DL (ref 0.1–1)
CALCIUM SERPL-MCNC: 9.6 MG/DL (ref 8.7–10.5)
CHLORIDE SERPL-SCNC: 109 MMOL/L (ref 95–110)
CO2 SERPL-SCNC: 26 MMOL/L (ref 23–29)
CREAT SERPL-MCNC: 0.84 MG/DL (ref 0.5–1.4)
DIFFERENTIAL METHOD: ABNORMAL
EOSINOPHIL # BLD AUTO: 0.2 K/UL (ref 0–0.5)
EOSINOPHIL NFR BLD: 2.9 % (ref 0–8)
ERYTHROCYTE [DISTWIDTH] IN BLOOD BY AUTOMATED COUNT: 15.2 % (ref 11.5–14.5)
EST. GFR  (AFRICAN AMERICAN): >60 ML/MIN/1.73 M^2
EST. GFR  (NON AFRICAN AMERICAN): >60 ML/MIN/1.73 M^2
GLUCOSE SERPL-MCNC: 77 MG/DL (ref 70–110)
HCT VFR BLD AUTO: 39 % (ref 37–48.5)
HGB BLD-MCNC: 11.9 G/DL (ref 12–16)
IMM GRANULOCYTES # BLD AUTO: 0.01 K/UL (ref 0–0.04)
IMM GRANULOCYTES NFR BLD AUTO: 0.2 % (ref 0–0.5)
LYMPHOCYTES # BLD AUTO: 2.3 K/UL (ref 1–4.8)
LYMPHOCYTES NFR BLD: 36.9 % (ref 18–48)
MAGNESIUM SERPL-MCNC: 2.2 MG/DL (ref 1.6–2.6)
MCH RBC QN AUTO: 25.2 PG (ref 27–31)
MCHC RBC AUTO-ENTMCNC: 30.5 G/DL (ref 32–36)
MCV RBC AUTO: 83 FL (ref 82–98)
MONOCYTES # BLD AUTO: 0.4 K/UL (ref 0.3–1)
MONOCYTES NFR BLD: 6.5 % (ref 4–15)
NEUTROPHILS # BLD AUTO: 3.3 K/UL (ref 1.8–7.7)
NEUTROPHILS NFR BLD: 53 % (ref 38–73)
NRBC BLD-RTO: 0 /100 WBC
PLATELET # BLD AUTO: 145 K/UL (ref 150–450)
PMV BLD AUTO: 11.8 FL (ref 9.2–12.9)
POTASSIUM SERPL-SCNC: 4.8 MMOL/L (ref 3.5–5.1)
PROT SERPL-MCNC: 7.8 G/DL (ref 6–8.4)
RBC # BLD AUTO: 4.72 M/UL (ref 4–5.4)
SODIUM SERPL-SCNC: 141 MMOL/L (ref 136–145)
TSH SERPL DL<=0.005 MIU/L-ACNC: 2.17 UIU/ML (ref 0.4–4)
UUN UR-MCNC: 14 MG/DL (ref 7–17)
WBC # BLD AUTO: 6.29 K/UL (ref 3.9–12.7)

## 2021-06-11 PROCEDURE — 80053 COMPREHEN METABOLIC PANEL: CPT | Mod: PO | Performed by: INTERNAL MEDICINE

## 2021-06-11 PROCEDURE — 83735 ASSAY OF MAGNESIUM: CPT | Mod: PO | Performed by: INTERNAL MEDICINE

## 2021-06-11 PROCEDURE — 36415 COLL VENOUS BLD VENIPUNCTURE: CPT | Mod: PO | Performed by: INTERNAL MEDICINE

## 2021-06-11 PROCEDURE — 84443 ASSAY THYROID STIM HORMONE: CPT | Mod: PO | Performed by: INTERNAL MEDICINE

## 2021-06-11 PROCEDURE — 82306 VITAMIN D 25 HYDROXY: CPT | Mod: PO | Performed by: INTERNAL MEDICINE

## 2021-06-11 PROCEDURE — 85025 COMPLETE CBC W/AUTO DIFF WBC: CPT | Mod: PO | Performed by: INTERNAL MEDICINE

## 2021-06-12 LAB — 25(OH)D3+25(OH)D2 SERPL-MCNC: 11 NG/ML (ref 30–96)

## 2021-06-19 ENCOUNTER — SPECIALTY PHARMACY (OUTPATIENT)
Dept: PHARMACY | Facility: CLINIC | Age: 76
End: 2021-06-19

## 2021-06-21 ENCOUNTER — OFFICE VISIT (OUTPATIENT)
Dept: HEMATOLOGY/ONCOLOGY | Facility: CLINIC | Age: 76
End: 2021-06-21
Payer: MEDICARE

## 2021-06-21 VITALS
RESPIRATION RATE: 18 BRPM | WEIGHT: 138.69 LBS | SYSTOLIC BLOOD PRESSURE: 107 MMHG | DIASTOLIC BLOOD PRESSURE: 68 MMHG | HEART RATE: 60 BPM | OXYGEN SATURATION: 99 % | BODY MASS INDEX: 18.3 KG/M2

## 2021-06-21 DIAGNOSIS — E55.9 VITAMIN D DEFICIENCY: ICD-10-CM

## 2021-06-21 DIAGNOSIS — K86.1 IDIOPATHIC CHRONIC PANCREATITIS: ICD-10-CM

## 2021-06-21 DIAGNOSIS — H54.8 LEGALLY BLIND: ICD-10-CM

## 2021-06-21 DIAGNOSIS — C34.92 ADENOCARCINOMA OF LEFT LUNG, STAGE 4: Primary | ICD-10-CM

## 2021-06-21 PROCEDURE — 3288F PR FALLS RISK ASSESSMENT DOCUMENTED: ICD-10-PCS | Mod: CPTII,S$GLB,, | Performed by: INTERNAL MEDICINE

## 2021-06-21 PROCEDURE — 1125F AMNT PAIN NOTED PAIN PRSNT: CPT | Mod: S$GLB,,, | Performed by: INTERNAL MEDICINE

## 2021-06-21 PROCEDURE — 1125F PR PAIN SEVERITY QUANTIFIED, PAIN PRESENT: ICD-10-PCS | Mod: S$GLB,,, | Performed by: INTERNAL MEDICINE

## 2021-06-21 PROCEDURE — 1159F PR MEDICATION LIST DOCUMENTED IN MEDICAL RECORD: ICD-10-PCS | Mod: S$GLB,,, | Performed by: INTERNAL MEDICINE

## 2021-06-21 PROCEDURE — 99214 OFFICE O/P EST MOD 30 MIN: CPT | Mod: S$GLB,,, | Performed by: INTERNAL MEDICINE

## 2021-06-21 PROCEDURE — 1101F PR PT FALLS ASSESS DOC 0-1 FALLS W/OUT INJ PAST YR: ICD-10-PCS | Mod: CPTII,S$GLB,, | Performed by: INTERNAL MEDICINE

## 2021-06-21 PROCEDURE — 99999 PR PBB SHADOW E&M-EST. PATIENT-LVL III: ICD-10-PCS | Mod: PBBFAC,,, | Performed by: INTERNAL MEDICINE

## 2021-06-21 PROCEDURE — 3288F FALL RISK ASSESSMENT DOCD: CPT | Mod: CPTII,S$GLB,, | Performed by: INTERNAL MEDICINE

## 2021-06-21 PROCEDURE — 99214 PR OFFICE/OUTPT VISIT, EST, LEVL IV, 30-39 MIN: ICD-10-PCS | Mod: S$GLB,,, | Performed by: INTERNAL MEDICINE

## 2021-06-21 PROCEDURE — 1159F MED LIST DOCD IN RCRD: CPT | Mod: S$GLB,,, | Performed by: INTERNAL MEDICINE

## 2021-06-21 PROCEDURE — 1101F PT FALLS ASSESS-DOCD LE1/YR: CPT | Mod: CPTII,S$GLB,, | Performed by: INTERNAL MEDICINE

## 2021-06-21 PROCEDURE — 99999 PR PBB SHADOW E&M-EST. PATIENT-LVL III: CPT | Mod: PBBFAC,,, | Performed by: INTERNAL MEDICINE

## 2021-06-21 RX ORDER — CHOLECALCIFEROL (VITAMIN D3) 1250 MCG
50000 TABLET ORAL WEEKLY
Qty: 12 TABLET | Refills: 3 | Status: SHIPPED | OUTPATIENT
Start: 2021-06-21 | End: 2022-01-24

## 2021-07-15 ENCOUNTER — TELEPHONE (OUTPATIENT)
Dept: HEMATOLOGY/ONCOLOGY | Facility: CLINIC | Age: 76
End: 2021-07-15

## 2021-07-16 ENCOUNTER — LAB VISIT (OUTPATIENT)
Dept: LAB | Facility: HOSPITAL | Age: 76
End: 2021-07-16
Attending: INTERNAL MEDICINE
Payer: MEDICARE

## 2021-07-16 DIAGNOSIS — C34.92 ADENOCARCINOMA OF LEFT LUNG, STAGE 4: ICD-10-CM

## 2021-07-16 LAB
ALBUMIN SERPL BCP-MCNC: 4 G/DL (ref 3.5–5.2)
ALP SERPL-CCNC: 92 U/L (ref 38–126)
ALT SERPL W/O P-5'-P-CCNC: <6 U/L (ref 10–44)
ANION GAP SERPL CALC-SCNC: 7 MMOL/L (ref 8–16)
AST SERPL-CCNC: 25 U/L (ref 15–46)
BASOPHILS # BLD AUTO: 0.01 K/UL (ref 0–0.2)
BASOPHILS NFR BLD: 0.3 % (ref 0–1.9)
BILIRUB SERPL-MCNC: 0.4 MG/DL (ref 0.1–1)
CALCIUM SERPL-MCNC: 9.5 MG/DL (ref 8.7–10.5)
CHLORIDE SERPL-SCNC: 107 MMOL/L (ref 95–110)
CO2 SERPL-SCNC: 26 MMOL/L (ref 23–29)
CREAT SERPL-MCNC: 1.07 MG/DL (ref 0.5–1.4)
DIFFERENTIAL METHOD: ABNORMAL
EOSINOPHIL # BLD AUTO: 0.1 K/UL (ref 0–0.5)
EOSINOPHIL NFR BLD: 2 % (ref 0–8)
ERYTHROCYTE [DISTWIDTH] IN BLOOD BY AUTOMATED COUNT: 15.2 % (ref 11.5–14.5)
EST. GFR  (AFRICAN AMERICAN): 58.3 ML/MIN/1.73 M^2
EST. GFR  (NON AFRICAN AMERICAN): 50.5 ML/MIN/1.73 M^2
GLUCOSE SERPL-MCNC: 85 MG/DL (ref 70–110)
HCT VFR BLD AUTO: 39.1 % (ref 37–48.5)
HGB BLD-MCNC: 12 G/DL (ref 12–16)
IMM GRANULOCYTES # BLD AUTO: 0.01 K/UL (ref 0–0.04)
IMM GRANULOCYTES NFR BLD AUTO: 0.3 % (ref 0–0.5)
LYMPHOCYTES # BLD AUTO: 1.9 K/UL (ref 1–4.8)
LYMPHOCYTES NFR BLD: 53.1 % (ref 18–48)
MCH RBC QN AUTO: 24.5 PG (ref 27–31)
MCHC RBC AUTO-ENTMCNC: 30.7 G/DL (ref 32–36)
MCV RBC AUTO: 80 FL (ref 82–98)
MONOCYTES # BLD AUTO: 0.4 K/UL (ref 0.3–1)
MONOCYTES NFR BLD: 10 % (ref 4–15)
NEUTROPHILS # BLD AUTO: 1.2 K/UL (ref 1.8–7.7)
NEUTROPHILS NFR BLD: 34.3 % (ref 38–73)
NRBC BLD-RTO: 0 /100 WBC
PLATELET # BLD AUTO: 116 K/UL (ref 150–450)
PMV BLD AUTO: 10.9 FL (ref 9.2–12.9)
POTASSIUM SERPL-SCNC: 4.1 MMOL/L (ref 3.5–5.1)
PROT SERPL-MCNC: 7.5 G/DL (ref 6–8.4)
RBC # BLD AUTO: 4.9 M/UL (ref 4–5.4)
SODIUM SERPL-SCNC: 140 MMOL/L (ref 136–145)
UUN UR-MCNC: 17 MG/DL (ref 7–17)
WBC # BLD AUTO: 3.5 K/UL (ref 3.9–12.7)

## 2021-07-16 PROCEDURE — 85025 COMPLETE CBC W/AUTO DIFF WBC: CPT | Mod: PO | Performed by: INTERNAL MEDICINE

## 2021-07-16 PROCEDURE — 80053 COMPREHEN METABOLIC PANEL: CPT | Mod: PO | Performed by: INTERNAL MEDICINE

## 2021-07-16 PROCEDURE — 36415 COLL VENOUS BLD VENIPUNCTURE: CPT | Mod: PO | Performed by: INTERNAL MEDICINE

## 2021-07-19 ENCOUNTER — OFFICE VISIT (OUTPATIENT)
Dept: HEMATOLOGY/ONCOLOGY | Facility: CLINIC | Age: 76
End: 2021-07-19
Payer: MEDICARE

## 2021-07-19 ENCOUNTER — SPECIALTY PHARMACY (OUTPATIENT)
Dept: PHARMACY | Facility: CLINIC | Age: 76
End: 2021-07-19

## 2021-07-19 VITALS
TEMPERATURE: 98 F | HEIGHT: 72 IN | WEIGHT: 136.88 LBS | RESPIRATION RATE: 18 BRPM | OXYGEN SATURATION: 99 % | BODY MASS INDEX: 18.54 KG/M2 | DIASTOLIC BLOOD PRESSURE: 56 MMHG | SYSTOLIC BLOOD PRESSURE: 93 MMHG | HEART RATE: 56 BPM

## 2021-07-19 DIAGNOSIS — E44.1 MILD PROTEIN-CALORIE MALNUTRITION: ICD-10-CM

## 2021-07-19 DIAGNOSIS — D69.59 CHEMOTHERAPY-INDUCED THROMBOCYTOPENIA: ICD-10-CM

## 2021-07-19 DIAGNOSIS — T45.1X5A CHEMOTHERAPY-INDUCED THROMBOCYTOPENIA: ICD-10-CM

## 2021-07-19 DIAGNOSIS — C34.32 MALIGNANT NEOPLASM OF LOWER LOBE OF LEFT LUNG: ICD-10-CM

## 2021-07-19 DIAGNOSIS — M94.9 DISORDER OF CARTILAGE, UNSPECIFIED: ICD-10-CM

## 2021-07-19 DIAGNOSIS — C34.92 ADENOCARCINOMA OF LEFT LUNG, STAGE 4: Primary | ICD-10-CM

## 2021-07-19 DIAGNOSIS — C79.31 BRAIN METASTASIS: ICD-10-CM

## 2021-07-19 DIAGNOSIS — K86.1 IDIOPATHIC CHRONIC PANCREATITIS: ICD-10-CM

## 2021-07-19 PROCEDURE — 1159F PR MEDICATION LIST DOCUMENTED IN MEDICAL RECORD: ICD-10-PCS | Mod: CPTII,S$GLB,, | Performed by: INTERNAL MEDICINE

## 2021-07-19 PROCEDURE — 99215 OFFICE O/P EST HI 40 MIN: CPT | Mod: S$GLB,,, | Performed by: INTERNAL MEDICINE

## 2021-07-19 PROCEDURE — 99215 PR OFFICE/OUTPT VISIT, EST, LEVL V, 40-54 MIN: ICD-10-PCS | Mod: S$GLB,,, | Performed by: INTERNAL MEDICINE

## 2021-07-19 PROCEDURE — 3288F PR FALLS RISK ASSESSMENT DOCUMENTED: ICD-10-PCS | Mod: CPTII,S$GLB,, | Performed by: INTERNAL MEDICINE

## 2021-07-19 PROCEDURE — 99999 PR PBB SHADOW E&M-EST. PATIENT-LVL III: ICD-10-PCS | Mod: PBBFAC,,, | Performed by: INTERNAL MEDICINE

## 2021-07-19 PROCEDURE — 1101F PR PT FALLS ASSESS DOC 0-1 FALLS W/OUT INJ PAST YR: ICD-10-PCS | Mod: CPTII,S$GLB,, | Performed by: INTERNAL MEDICINE

## 2021-07-19 PROCEDURE — 3288F FALL RISK ASSESSMENT DOCD: CPT | Mod: CPTII,S$GLB,, | Performed by: INTERNAL MEDICINE

## 2021-07-19 PROCEDURE — 1126F PR PAIN SEVERITY QUANTIFIED, NO PAIN PRESENT: ICD-10-PCS | Mod: CPTII,S$GLB,, | Performed by: INTERNAL MEDICINE

## 2021-07-19 PROCEDURE — 1159F MED LIST DOCD IN RCRD: CPT | Mod: CPTII,S$GLB,, | Performed by: INTERNAL MEDICINE

## 2021-07-19 PROCEDURE — 99999 PR PBB SHADOW E&M-EST. PATIENT-LVL III: CPT | Mod: PBBFAC,,, | Performed by: INTERNAL MEDICINE

## 2021-07-19 PROCEDURE — 99499 RISK ADDL DX/OHS AUDIT: ICD-10-PCS | Mod: S$GLB,,, | Performed by: INTERNAL MEDICINE

## 2021-07-19 PROCEDURE — 1101F PT FALLS ASSESS-DOCD LE1/YR: CPT | Mod: CPTII,S$GLB,, | Performed by: INTERNAL MEDICINE

## 2021-07-19 PROCEDURE — 1126F AMNT PAIN NOTED NONE PRSNT: CPT | Mod: CPTII,S$GLB,, | Performed by: INTERNAL MEDICINE

## 2021-07-19 PROCEDURE — 99499 UNLISTED E&M SERVICE: CPT | Mod: S$GLB,,, | Performed by: INTERNAL MEDICINE

## 2021-07-29 ENCOUNTER — HOSPITAL ENCOUNTER (OUTPATIENT)
Facility: HOSPITAL | Age: 76
Discharge: HOME HEALTH | End: 2021-08-02
Attending: EMERGENCY MEDICINE | Admitting: INTERNAL MEDICINE
Payer: MEDICARE

## 2021-07-29 DIAGNOSIS — K85.00 IDIOPATHIC ACUTE PANCREATITIS WITHOUT INFECTION OR NECROSIS: ICD-10-CM

## 2021-07-29 DIAGNOSIS — C80.1 CANCER: Chronic | ICD-10-CM

## 2021-07-29 DIAGNOSIS — K85.80 OTHER ACUTE PANCREATITIS, UNSPECIFIED COMPLICATION STATUS: ICD-10-CM

## 2021-07-29 DIAGNOSIS — S09.90XS DEMENTIA DUE TO HEAD TRAUMA WITHOUT BEHAVIORAL DISTURBANCE: ICD-10-CM

## 2021-07-29 DIAGNOSIS — F02.80 DEMENTIA DUE TO HEAD TRAUMA WITHOUT BEHAVIORAL DISTURBANCE: ICD-10-CM

## 2021-07-29 DIAGNOSIS — K85.90 ACUTE PANCREATITIS WITHOUT INFECTION OR NECROSIS, UNSPECIFIED PANCREATITIS TYPE: ICD-10-CM

## 2021-07-29 DIAGNOSIS — K85.90 ACUTE PANCREATITIS, UNSPECIFIED COMPLICATION STATUS, UNSPECIFIED PANCREATITIS TYPE: Primary | ICD-10-CM

## 2021-07-29 LAB
ALBUMIN SERPL BCP-MCNC: 3.9 G/DL (ref 3.5–5.2)
ALP SERPL-CCNC: 80 U/L (ref 38–126)
ALT SERPL W/O P-5'-P-CCNC: 9 U/L (ref 10–44)
ANION GAP SERPL CALC-SCNC: 7 MMOL/L (ref 8–16)
AST SERPL-CCNC: 32 U/L (ref 15–46)
BASOPHILS # BLD AUTO: 0 K/UL (ref 0–0.2)
BASOPHILS NFR BLD: 0 % (ref 0–1.9)
BILIRUB SERPL-MCNC: 0.4 MG/DL (ref 0.1–1)
BILIRUB UR QL STRIP: NEGATIVE
CALCIUM SERPL-MCNC: 9.2 MG/DL (ref 8.7–10.5)
CHLORIDE SERPL-SCNC: 107 MMOL/L (ref 95–110)
CLARITY UR REFRACT.AUTO: CLEAR
CO2 SERPL-SCNC: 26 MMOL/L (ref 23–29)
COLOR UR AUTO: YELLOW
CREAT SERPL-MCNC: 0.92 MG/DL (ref 0.5–1.4)
DIFFERENTIAL METHOD: ABNORMAL
EOSINOPHIL # BLD AUTO: 0 K/UL (ref 0–0.5)
EOSINOPHIL NFR BLD: 0.5 % (ref 0–8)
ERYTHROCYTE [DISTWIDTH] IN BLOOD BY AUTOMATED COUNT: 15.7 % (ref 11.5–14.5)
EST. GFR  (AFRICAN AMERICAN): >60 ML/MIN/1.73 M^2
EST. GFR  (NON AFRICAN AMERICAN): >60 ML/MIN/1.73 M^2
GLUCOSE SERPL-MCNC: 134 MG/DL (ref 70–110)
GLUCOSE UR QL STRIP: NEGATIVE
HCT VFR BLD AUTO: 35.1 % (ref 37–48.5)
HGB BLD-MCNC: 10.8 G/DL (ref 12–16)
HGB UR QL STRIP: NEGATIVE
IMM GRANULOCYTES # BLD AUTO: 0.01 K/UL (ref 0–0.04)
IMM GRANULOCYTES NFR BLD AUTO: 0.3 % (ref 0–0.5)
KETONES UR QL STRIP: NEGATIVE
LACTATE SERPL-SCNC: 2 MMOL/L (ref 0.5–2.2)
LEUKOCYTE ESTERASE UR QL STRIP: ABNORMAL
LIPASE SERPL-CCNC: 1467 U/L (ref 23–300)
LYMPHOCYTES # BLD AUTO: 1.7 K/UL (ref 1–4.8)
LYMPHOCYTES NFR BLD: 43.8 % (ref 18–48)
MCH RBC QN AUTO: 24.5 PG (ref 27–31)
MCHC RBC AUTO-ENTMCNC: 30.8 G/DL (ref 32–36)
MCV RBC AUTO: 80 FL (ref 82–98)
MICROSCOPIC COMMENT: NORMAL
MONOCYTES # BLD AUTO: 0.3 K/UL (ref 0.3–1)
MONOCYTES NFR BLD: 7.6 % (ref 4–15)
NEUTROPHILS # BLD AUTO: 1.9 K/UL (ref 1.8–7.7)
NEUTROPHILS NFR BLD: 47.8 % (ref 38–73)
NITRITE UR QL STRIP: NEGATIVE
NRBC BLD-RTO: 0 /100 WBC
PH UR STRIP: 5 [PH] (ref 5–8)
PLATELET # BLD AUTO: 100 K/UL (ref 150–450)
PMV BLD AUTO: 11.4 FL (ref 9.2–12.9)
POTASSIUM SERPL-SCNC: 4 MMOL/L (ref 3.5–5.1)
PROT SERPL-MCNC: 7 G/DL (ref 6–8.4)
PROT UR QL STRIP: NEGATIVE
RBC # BLD AUTO: 4.41 M/UL (ref 4–5.4)
SARS-COV-2 RDRP RESP QL NAA+PROBE: NEGATIVE
SODIUM SERPL-SCNC: 140 MMOL/L (ref 136–145)
SP GR UR STRIP: 1.02 (ref 1–1.03)
URN SPEC COLLECT METH UR: ABNORMAL
UROBILINOGEN UR STRIP-ACNC: NEGATIVE EU/DL
UUN UR-MCNC: 15 MG/DL (ref 7–17)
WBC # BLD AUTO: 3.95 K/UL (ref 3.9–12.7)
WBC #/AREA URNS AUTO: 2 /HPF (ref 0–5)

## 2021-07-29 PROCEDURE — 81000 URINALYSIS NONAUTO W/SCOPE: CPT | Mod: ER | Performed by: PHYSICIAN ASSISTANT

## 2021-07-29 PROCEDURE — 83690 ASSAY OF LIPASE: CPT | Mod: ER | Performed by: PHYSICIAN ASSISTANT

## 2021-07-29 PROCEDURE — U0002 COVID-19 LAB TEST NON-CDC: HCPCS | Mod: ER | Performed by: EMERGENCY MEDICINE

## 2021-07-29 PROCEDURE — 85025 COMPLETE CBC W/AUTO DIFF WBC: CPT | Mod: ER | Performed by: PHYSICIAN ASSISTANT

## 2021-07-29 PROCEDURE — 96374 THER/PROPH/DIAG INJ IV PUSH: CPT | Mod: 59,ER

## 2021-07-29 PROCEDURE — 80053 COMPREHEN METABOLIC PANEL: CPT | Mod: ER | Performed by: PHYSICIAN ASSISTANT

## 2021-07-29 PROCEDURE — 63600175 PHARM REV CODE 636 W HCPCS: Mod: ER | Performed by: EMERGENCY MEDICINE

## 2021-07-29 PROCEDURE — 83605 ASSAY OF LACTIC ACID: CPT | Mod: ER | Performed by: EMERGENCY MEDICINE

## 2021-07-29 PROCEDURE — 63600175 PHARM REV CODE 636 W HCPCS: Mod: ER | Performed by: PHYSICIAN ASSISTANT

## 2021-07-29 PROCEDURE — 99285 EMERGENCY DEPT VISIT HI MDM: CPT | Mod: 25,ER

## 2021-07-29 PROCEDURE — 96375 TX/PRO/DX INJ NEW DRUG ADDON: CPT | Mod: ER

## 2021-07-29 RX ORDER — MORPHINE SULFATE 4 MG/ML
2 INJECTION, SOLUTION INTRAMUSCULAR; INTRAVENOUS
Status: COMPLETED | OUTPATIENT
Start: 2021-07-29 | End: 2021-07-29

## 2021-07-29 RX ORDER — ONDANSETRON 2 MG/ML
4 INJECTION INTRAMUSCULAR; INTRAVENOUS
Status: COMPLETED | OUTPATIENT
Start: 2021-07-29 | End: 2021-07-29

## 2021-07-29 RX ADMIN — MORPHINE SULFATE 2 MG: 4 INJECTION, SOLUTION INTRAMUSCULAR; INTRAVENOUS at 10:07

## 2021-07-29 RX ADMIN — ONDANSETRON 4 MG: 2 INJECTION INTRAMUSCULAR; INTRAVENOUS at 07:07

## 2021-07-30 LAB
ALBUMIN SERPL BCP-MCNC: 3.3 G/DL (ref 3.5–5.2)
ALP SERPL-CCNC: 76 U/L (ref 55–135)
ALT SERPL W/O P-5'-P-CCNC: 17 U/L (ref 10–44)
ANION GAP SERPL CALC-SCNC: 8 MMOL/L (ref 8–16)
AST SERPL-CCNC: 59 U/L (ref 10–40)
BASOPHILS # BLD AUTO: 0 K/UL (ref 0–0.2)
BASOPHILS NFR BLD: 0 % (ref 0–1.9)
BILIRUB SERPL-MCNC: 0.3 MG/DL (ref 0.1–1)
BUN SERPL-MCNC: 10 MG/DL (ref 8–23)
CALCIUM SERPL-MCNC: 8.9 MG/DL (ref 8.7–10.5)
CHLORIDE SERPL-SCNC: 112 MMOL/L (ref 95–110)
CO2 SERPL-SCNC: 20 MMOL/L (ref 23–29)
CREAT SERPL-MCNC: 0.8 MG/DL (ref 0.5–1.4)
DIFFERENTIAL METHOD: ABNORMAL
EOSINOPHIL # BLD AUTO: 0 K/UL (ref 0–0.5)
EOSINOPHIL NFR BLD: 0 % (ref 0–8)
ERYTHROCYTE [DISTWIDTH] IN BLOOD BY AUTOMATED COUNT: 15.9 % (ref 11.5–14.5)
EST. GFR  (AFRICAN AMERICAN): >60 ML/MIN/1.73 M^2
EST. GFR  (NON AFRICAN AMERICAN): >60 ML/MIN/1.73 M^2
GLUCOSE SERPL-MCNC: 94 MG/DL (ref 70–110)
HCT VFR BLD AUTO: 35.5 % (ref 37–48.5)
HGB BLD-MCNC: 11.1 G/DL (ref 12–16)
IMM GRANULOCYTES # BLD AUTO: 0 K/UL (ref 0–0.04)
IMM GRANULOCYTES NFR BLD AUTO: 0 % (ref 0–0.5)
LIPASE SERPL-CCNC: 441 U/L (ref 4–60)
LYMPHOCYTES # BLD AUTO: 1 K/UL (ref 1–4.8)
LYMPHOCYTES NFR BLD: 31.5 % (ref 18–48)
MAGNESIUM SERPL-MCNC: 2.1 MG/DL (ref 1.6–2.6)
MCH RBC QN AUTO: 24.7 PG (ref 27–31)
MCHC RBC AUTO-ENTMCNC: 31.3 G/DL (ref 32–36)
MCV RBC AUTO: 79 FL (ref 82–98)
MONOCYTES # BLD AUTO: 0.1 K/UL (ref 0.3–1)
MONOCYTES NFR BLD: 4.2 % (ref 4–15)
NEUTROPHILS # BLD AUTO: 2 K/UL (ref 1.8–7.7)
NEUTROPHILS NFR BLD: 64.3 % (ref 38–73)
NRBC BLD-RTO: 0 /100 WBC
PHOSPHATE SERPL-MCNC: 3.1 MG/DL (ref 2.7–4.5)
PLATELET # BLD AUTO: 93 K/UL (ref 150–450)
PMV BLD AUTO: ABNORMAL FL (ref 9.2–12.9)
POTASSIUM SERPL-SCNC: 4.8 MMOL/L (ref 3.5–5.1)
PROT SERPL-MCNC: 6.8 G/DL (ref 6–8.4)
RBC # BLD AUTO: 4.5 M/UL (ref 4–5.4)
SODIUM SERPL-SCNC: 140 MMOL/L (ref 136–145)
WBC # BLD AUTO: 3.11 K/UL (ref 3.9–12.7)

## 2021-07-30 PROCEDURE — 99214 PR OFFICE/OUTPT VISIT, EST, LEVL IV, 30-39 MIN: ICD-10-PCS | Mod: GC,,, | Performed by: INTERNAL MEDICINE

## 2021-07-30 PROCEDURE — 96372 THER/PROPH/DIAG INJ SC/IM: CPT | Mod: 59 | Performed by: EMERGENCY MEDICINE

## 2021-07-30 PROCEDURE — 93010 ELECTROCARDIOGRAM REPORT: CPT | Mod: ,,, | Performed by: INTERNAL MEDICINE

## 2021-07-30 PROCEDURE — 85025 COMPLETE CBC W/AUTO DIFF WBC: CPT | Performed by: STUDENT IN AN ORGANIZED HEALTH CARE EDUCATION/TRAINING PROGRAM

## 2021-07-30 PROCEDURE — 93005 ELECTROCARDIOGRAM TRACING: CPT

## 2021-07-30 PROCEDURE — 93010 EKG 12-LEAD: ICD-10-PCS | Mod: ,,, | Performed by: INTERNAL MEDICINE

## 2021-07-30 PROCEDURE — 83735 ASSAY OF MAGNESIUM: CPT | Performed by: STUDENT IN AN ORGANIZED HEALTH CARE EDUCATION/TRAINING PROGRAM

## 2021-07-30 PROCEDURE — 83690 ASSAY OF LIPASE: CPT | Performed by: STUDENT IN AN ORGANIZED HEALTH CARE EDUCATION/TRAINING PROGRAM

## 2021-07-30 PROCEDURE — 96361 HYDRATE IV INFUSION ADD-ON: CPT | Performed by: EMERGENCY MEDICINE

## 2021-07-30 PROCEDURE — 25500020 PHARM REV CODE 255: Performed by: INTERNAL MEDICINE

## 2021-07-30 PROCEDURE — A9698 NON-RAD CONTRAST MATERIALNOC: HCPCS | Performed by: INTERNAL MEDICINE

## 2021-07-30 PROCEDURE — 36415 COLL VENOUS BLD VENIPUNCTURE: CPT | Performed by: STUDENT IN AN ORGANIZED HEALTH CARE EDUCATION/TRAINING PROGRAM

## 2021-07-30 PROCEDURE — 99214 OFFICE O/P EST MOD 30 MIN: CPT | Mod: GC,,, | Performed by: INTERNAL MEDICINE

## 2021-07-30 PROCEDURE — 25000003 PHARM REV CODE 250: Performed by: STUDENT IN AN ORGANIZED HEALTH CARE EDUCATION/TRAINING PROGRAM

## 2021-07-30 PROCEDURE — 80053 COMPREHEN METABOLIC PANEL: CPT | Performed by: STUDENT IN AN ORGANIZED HEALTH CARE EDUCATION/TRAINING PROGRAM

## 2021-07-30 PROCEDURE — 96375 TX/PRO/DX INJ NEW DRUG ADDON: CPT | Performed by: EMERGENCY MEDICINE

## 2021-07-30 PROCEDURE — G0378 HOSPITAL OBSERVATION PER HR: HCPCS

## 2021-07-30 PROCEDURE — 96376 TX/PRO/DX INJ SAME DRUG ADON: CPT | Performed by: EMERGENCY MEDICINE

## 2021-07-30 PROCEDURE — 84100 ASSAY OF PHOSPHORUS: CPT | Performed by: STUDENT IN AN ORGANIZED HEALTH CARE EDUCATION/TRAINING PROGRAM

## 2021-07-30 PROCEDURE — 63600175 PHARM REV CODE 636 W HCPCS: Performed by: STUDENT IN AN ORGANIZED HEALTH CARE EDUCATION/TRAINING PROGRAM

## 2021-07-30 RX ORDER — DICLOFENAC SODIUM 10 MG/G
2 GEL TOPICAL
Status: ON HOLD | COMMUNITY
End: 2021-12-27

## 2021-07-30 RX ORDER — GLUCAGON 1 MG
1 KIT INJECTION
Status: DISCONTINUED | OUTPATIENT
Start: 2021-07-30 | End: 2021-08-02 | Stop reason: HOSPADM

## 2021-07-30 RX ORDER — MORPHINE SULFATE 2 MG/ML
2 INJECTION, SOLUTION INTRAMUSCULAR; INTRAVENOUS EVERY 6 HOURS PRN
Status: DISCONTINUED | OUTPATIENT
Start: 2021-07-30 | End: 2021-08-02 | Stop reason: HOSPADM

## 2021-07-30 RX ORDER — OXYCODONE AND ACETAMINOPHEN 7.5; 325 MG/1; MG/1
1 TABLET ORAL EVERY 6 HOURS PRN
Status: DISCONTINUED | OUTPATIENT
Start: 2021-07-30 | End: 2021-08-02 | Stop reason: HOSPADM

## 2021-07-30 RX ORDER — ENOXAPARIN SODIUM 100 MG/ML
40 INJECTION SUBCUTANEOUS EVERY 24 HOURS
Status: DISCONTINUED | OUTPATIENT
Start: 2021-07-30 | End: 2021-08-01

## 2021-07-30 RX ORDER — ONDANSETRON 2 MG/ML
4 INJECTION INTRAMUSCULAR; INTRAVENOUS EVERY 6 HOURS PRN
Status: DISCONTINUED | OUTPATIENT
Start: 2021-07-30 | End: 2021-08-02 | Stop reason: HOSPADM

## 2021-07-30 RX ORDER — IBUPROFEN 200 MG
24 TABLET ORAL
Status: DISCONTINUED | OUTPATIENT
Start: 2021-07-30 | End: 2021-08-02 | Stop reason: HOSPADM

## 2021-07-30 RX ORDER — HYDRALAZINE HYDROCHLORIDE 25 MG/1
25 TABLET, FILM COATED ORAL EVERY 8 HOURS PRN
Status: DISCONTINUED | OUTPATIENT
Start: 2021-07-30 | End: 2021-08-02 | Stop reason: HOSPADM

## 2021-07-30 RX ORDER — IBUPROFEN 200 MG
16 TABLET ORAL
Status: DISCONTINUED | OUTPATIENT
Start: 2021-07-30 | End: 2021-08-02 | Stop reason: HOSPADM

## 2021-07-30 RX ORDER — SODIUM CHLORIDE 0.9 % (FLUSH) 0.9 %
10 SYRINGE (ML) INJECTION
Status: DISCONTINUED | OUTPATIENT
Start: 2021-07-30 | End: 2021-08-02 | Stop reason: HOSPADM

## 2021-07-30 RX ORDER — SODIUM CHLORIDE, SODIUM LACTATE, POTASSIUM CHLORIDE, CALCIUM CHLORIDE 600; 310; 30; 20 MG/100ML; MG/100ML; MG/100ML; MG/100ML
INJECTION, SOLUTION INTRAVENOUS CONTINUOUS
Status: DISCONTINUED | OUTPATIENT
Start: 2021-07-30 | End: 2021-08-01

## 2021-07-30 RX ADMIN — IOHEXOL 75 ML: 350 INJECTION, SOLUTION INTRAVENOUS at 12:07

## 2021-07-30 RX ADMIN — MORPHINE SULFATE 2 MG: 2 INJECTION, SOLUTION INTRAMUSCULAR; INTRAVENOUS at 04:07

## 2021-07-30 RX ADMIN — IOHEXOL 1000 ML: 9 SOLUTION ORAL at 10:07

## 2021-07-30 RX ADMIN — OXYCODONE AND ACETAMINOPHEN 1 TABLET: 7.5; 325 TABLET ORAL at 10:07

## 2021-07-30 RX ADMIN — SODIUM CHLORIDE, SODIUM LACTATE, POTASSIUM CHLORIDE, AND CALCIUM CHLORIDE: .6; .31; .03; .02 INJECTION, SOLUTION INTRAVENOUS at 04:07

## 2021-07-30 RX ADMIN — ONDANSETRON 4 MG: 2 INJECTION INTRAMUSCULAR; INTRAVENOUS at 05:07

## 2021-07-30 RX ADMIN — SODIUM CHLORIDE, SODIUM LACTATE, POTASSIUM CHLORIDE, AND CALCIUM CHLORIDE: .6; .31; .03; .02 INJECTION, SOLUTION INTRAVENOUS at 10:07

## 2021-07-30 RX ADMIN — MORPHINE SULFATE 2 MG: 2 INJECTION, SOLUTION INTRAMUSCULAR; INTRAVENOUS at 02:07

## 2021-07-30 RX ADMIN — ONDANSETRON 4 MG: 2 INJECTION INTRAMUSCULAR; INTRAVENOUS at 02:07

## 2021-07-30 RX ADMIN — SODIUM CHLORIDE, SODIUM LACTATE, POTASSIUM CHLORIDE, AND CALCIUM CHLORIDE: .6; .31; .03; .02 INJECTION, SOLUTION INTRAVENOUS at 06:07

## 2021-07-30 RX ADMIN — ENOXAPARIN SODIUM 40 MG: 40 INJECTION SUBCUTANEOUS at 06:07

## 2021-07-31 LAB
ALBUMIN SERPL BCP-MCNC: 2.9 G/DL (ref 3.5–5.2)
ALP SERPL-CCNC: 113 U/L (ref 55–135)
ALT SERPL W/O P-5'-P-CCNC: 65 U/L (ref 10–44)
ANION GAP SERPL CALC-SCNC: 7 MMOL/L (ref 8–16)
AST SERPL-CCNC: 167 U/L (ref 10–40)
BASOPHILS # BLD AUTO: 0.01 K/UL (ref 0–0.2)
BASOPHILS NFR BLD: 0.1 % (ref 0–1.9)
BILIRUB SERPL-MCNC: 0.5 MG/DL (ref 0.1–1)
BUN SERPL-MCNC: 7 MG/DL (ref 8–23)
CALCIUM SERPL-MCNC: 8.9 MG/DL (ref 8.7–10.5)
CHLORIDE SERPL-SCNC: 108 MMOL/L (ref 95–110)
CO2 SERPL-SCNC: 23 MMOL/L (ref 23–29)
CREAT SERPL-MCNC: 0.9 MG/DL (ref 0.5–1.4)
DIFFERENTIAL METHOD: ABNORMAL
EOSINOPHIL # BLD AUTO: 0 K/UL (ref 0–0.5)
EOSINOPHIL NFR BLD: 0 % (ref 0–8)
ERYTHROCYTE [DISTWIDTH] IN BLOOD BY AUTOMATED COUNT: 15.8 % (ref 11.5–14.5)
EST. GFR  (AFRICAN AMERICAN): >60 ML/MIN/1.73 M^2
EST. GFR  (NON AFRICAN AMERICAN): >60 ML/MIN/1.73 M^2
GLUCOSE SERPL-MCNC: 75 MG/DL (ref 70–110)
HCT VFR BLD AUTO: 32.3 % (ref 37–48.5)
HGB BLD-MCNC: 10.2 G/DL (ref 12–16)
IMM GRANULOCYTES # BLD AUTO: 0.02 K/UL (ref 0–0.04)
IMM GRANULOCYTES NFR BLD AUTO: 0.3 % (ref 0–0.5)
LYMPHOCYTES # BLD AUTO: 1.5 K/UL (ref 1–4.8)
LYMPHOCYTES NFR BLD: 19.9 % (ref 18–48)
MAGNESIUM SERPL-MCNC: 1.9 MG/DL (ref 1.6–2.6)
MCH RBC QN AUTO: 24.6 PG (ref 27–31)
MCHC RBC AUTO-ENTMCNC: 31.6 G/DL (ref 32–36)
MCV RBC AUTO: 78 FL (ref 82–98)
MONOCYTES # BLD AUTO: 0.5 K/UL (ref 0.3–1)
MONOCYTES NFR BLD: 6.1 % (ref 4–15)
NEUTROPHILS # BLD AUTO: 5.4 K/UL (ref 1.8–7.7)
NEUTROPHILS NFR BLD: 73.6 % (ref 38–73)
NRBC BLD-RTO: 0 /100 WBC
PHOSPHATE SERPL-MCNC: 2.5 MG/DL (ref 2.7–4.5)
PLATELET # BLD AUTO: 90 K/UL (ref 150–450)
PMV BLD AUTO: 11.4 FL (ref 9.2–12.9)
POTASSIUM SERPL-SCNC: 3.8 MMOL/L (ref 3.5–5.1)
PROT SERPL-MCNC: 6.2 G/DL (ref 6–8.4)
RBC # BLD AUTO: 4.15 M/UL (ref 4–5.4)
SODIUM SERPL-SCNC: 138 MMOL/L (ref 136–145)
WBC # BLD AUTO: 7.34 K/UL (ref 3.9–12.7)

## 2021-07-31 PROCEDURE — 63600175 PHARM REV CODE 636 W HCPCS: Performed by: STUDENT IN AN ORGANIZED HEALTH CARE EDUCATION/TRAINING PROGRAM

## 2021-07-31 PROCEDURE — 96372 THER/PROPH/DIAG INJ SC/IM: CPT | Mod: 59 | Performed by: EMERGENCY MEDICINE

## 2021-07-31 PROCEDURE — 96366 THER/PROPH/DIAG IV INF ADDON: CPT | Performed by: EMERGENCY MEDICINE

## 2021-07-31 PROCEDURE — 96365 THER/PROPH/DIAG IV INF INIT: CPT | Performed by: EMERGENCY MEDICINE

## 2021-07-31 PROCEDURE — 80053 COMPREHEN METABOLIC PANEL: CPT | Performed by: STUDENT IN AN ORGANIZED HEALTH CARE EDUCATION/TRAINING PROGRAM

## 2021-07-31 PROCEDURE — 36415 COLL VENOUS BLD VENIPUNCTURE: CPT | Performed by: STUDENT IN AN ORGANIZED HEALTH CARE EDUCATION/TRAINING PROGRAM

## 2021-07-31 PROCEDURE — 83735 ASSAY OF MAGNESIUM: CPT | Performed by: STUDENT IN AN ORGANIZED HEALTH CARE EDUCATION/TRAINING PROGRAM

## 2021-07-31 PROCEDURE — 25000003 PHARM REV CODE 250: Performed by: STUDENT IN AN ORGANIZED HEALTH CARE EDUCATION/TRAINING PROGRAM

## 2021-07-31 PROCEDURE — 84100 ASSAY OF PHOSPHORUS: CPT | Performed by: STUDENT IN AN ORGANIZED HEALTH CARE EDUCATION/TRAINING PROGRAM

## 2021-07-31 PROCEDURE — 85025 COMPLETE CBC W/AUTO DIFF WBC: CPT | Performed by: STUDENT IN AN ORGANIZED HEALTH CARE EDUCATION/TRAINING PROGRAM

## 2021-07-31 PROCEDURE — G0378 HOSPITAL OBSERVATION PER HR: HCPCS

## 2021-07-31 RX ORDER — SODIUM,POTASSIUM PHOSPHATES 280-250MG
1 POWDER IN PACKET (EA) ORAL ONCE
Status: COMPLETED | OUTPATIENT
Start: 2021-07-31 | End: 2021-07-31

## 2021-07-31 RX ORDER — MAGNESIUM SULFATE HEPTAHYDRATE 40 MG/ML
2 INJECTION, SOLUTION INTRAVENOUS ONCE
Status: COMPLETED | OUTPATIENT
Start: 2021-07-31 | End: 2021-07-31

## 2021-07-31 RX ADMIN — SODIUM CHLORIDE, SODIUM LACTATE, POTASSIUM CHLORIDE, AND CALCIUM CHLORIDE: .6; .31; .03; .02 INJECTION, SOLUTION INTRAVENOUS at 09:07

## 2021-07-31 RX ADMIN — SODIUM CHLORIDE, SODIUM LACTATE, POTASSIUM CHLORIDE, AND CALCIUM CHLORIDE: .6; .31; .03; .02 INJECTION, SOLUTION INTRAVENOUS at 12:07

## 2021-07-31 RX ADMIN — ENOXAPARIN SODIUM 40 MG: 40 INJECTION SUBCUTANEOUS at 05:07

## 2021-07-31 RX ADMIN — POTASSIUM & SODIUM PHOSPHATES POWDER PACK 280-160-250 MG 1 PACKET: 280-160-250 PACK at 09:07

## 2021-07-31 RX ADMIN — MAGNESIUM SULFATE IN WATER 2 G: 40 INJECTION, SOLUTION INTRAVENOUS at 09:07

## 2021-07-31 RX ADMIN — SODIUM CHLORIDE, SODIUM LACTATE, POTASSIUM CHLORIDE, AND CALCIUM CHLORIDE: .6; .31; .03; .02 INJECTION, SOLUTION INTRAVENOUS at 05:07

## 2021-08-01 LAB
ALBUMIN SERPL BCP-MCNC: 2.7 G/DL (ref 3.5–5.2)
ALBUMIN SERPL BCP-MCNC: 2.8 G/DL (ref 3.5–5.2)
ALP SERPL-CCNC: 92 U/L (ref 55–135)
ALP SERPL-CCNC: 99 U/L (ref 55–135)
ALT SERPL W/O P-5'-P-CCNC: 38 U/L (ref 10–44)
ALT SERPL W/O P-5'-P-CCNC: 44 U/L (ref 10–44)
ANION GAP SERPL CALC-SCNC: 9 MMOL/L (ref 8–16)
APTT BLDCRRT: 36.2 SEC (ref 21–32)
AST SERPL-CCNC: 68 U/L (ref 10–40)
AST SERPL-CCNC: 85 U/L (ref 10–40)
BASOPHILS # BLD AUTO: 0.01 K/UL (ref 0–0.2)
BASOPHILS NFR BLD: 0.2 % (ref 0–1.9)
BILIRUB DIRECT SERPL-MCNC: 0.2 MG/DL (ref 0.1–0.3)
BILIRUB SERPL-MCNC: 0.5 MG/DL (ref 0.1–1)
BILIRUB SERPL-MCNC: 0.5 MG/DL (ref 0.1–1)
BUN SERPL-MCNC: 7 MG/DL (ref 8–23)
CALCIUM SERPL-MCNC: 8.8 MG/DL (ref 8.7–10.5)
CHLORIDE SERPL-SCNC: 109 MMOL/L (ref 95–110)
CO2 SERPL-SCNC: 17 MMOL/L (ref 23–29)
CREAT SERPL-MCNC: 0.8 MG/DL (ref 0.5–1.4)
DIFFERENTIAL METHOD: ABNORMAL
EOSINOPHIL # BLD AUTO: 0 K/UL (ref 0–0.5)
EOSINOPHIL NFR BLD: 0.7 % (ref 0–8)
ERYTHROCYTE [DISTWIDTH] IN BLOOD BY AUTOMATED COUNT: 16.1 % (ref 11.5–14.5)
EST. GFR  (AFRICAN AMERICAN): >60 ML/MIN/1.73 M^2
EST. GFR  (NON AFRICAN AMERICAN): >60 ML/MIN/1.73 M^2
GLUCOSE SERPL-MCNC: 51 MG/DL (ref 70–110)
HCT VFR BLD AUTO: 35.9 % (ref 37–48.5)
HGB BLD-MCNC: 10.5 G/DL (ref 12–16)
IMM GRANULOCYTES # BLD AUTO: 0.02 K/UL (ref 0–0.04)
IMM GRANULOCYTES NFR BLD AUTO: 0.4 % (ref 0–0.5)
LYMPHOCYTES # BLD AUTO: 1.3 K/UL (ref 1–4.8)
LYMPHOCYTES NFR BLD: 23.7 % (ref 18–48)
MAGNESIUM SERPL-MCNC: 2.2 MG/DL (ref 1.6–2.6)
MCH RBC QN AUTO: 23.8 PG (ref 27–31)
MCHC RBC AUTO-ENTMCNC: 29.2 G/DL (ref 32–36)
MCV RBC AUTO: 81 FL (ref 82–98)
MONOCYTES # BLD AUTO: 0.4 K/UL (ref 0.3–1)
MONOCYTES NFR BLD: 6.7 % (ref 4–15)
NEUTROPHILS # BLD AUTO: 3.9 K/UL (ref 1.8–7.7)
NEUTROPHILS NFR BLD: 68.3 % (ref 38–73)
NRBC BLD-RTO: 0 /100 WBC
PHOSPHATE SERPL-MCNC: 2.6 MG/DL (ref 2.7–4.5)
PLATELET # BLD AUTO: 65 K/UL (ref 150–450)
PMV BLD AUTO: ABNORMAL FL (ref 9.2–12.9)
POTASSIUM SERPL-SCNC: 4.5 MMOL/L (ref 3.5–5.1)
PROT SERPL-MCNC: 6 G/DL (ref 6–8.4)
PROT SERPL-MCNC: 6.6 G/DL (ref 6–8.4)
RBC # BLD AUTO: 4.41 M/UL (ref 4–5.4)
SODIUM SERPL-SCNC: 135 MMOL/L (ref 136–145)
WBC # BLD AUTO: 5.66 K/UL (ref 3.9–12.7)

## 2021-08-01 PROCEDURE — 85730 THROMBOPLASTIN TIME PARTIAL: CPT | Performed by: STUDENT IN AN ORGANIZED HEALTH CARE EDUCATION/TRAINING PROGRAM

## 2021-08-01 PROCEDURE — 96361 HYDRATE IV INFUSION ADD-ON: CPT | Mod: 59 | Performed by: EMERGENCY MEDICINE

## 2021-08-01 PROCEDURE — 36415 COLL VENOUS BLD VENIPUNCTURE: CPT | Performed by: STUDENT IN AN ORGANIZED HEALTH CARE EDUCATION/TRAINING PROGRAM

## 2021-08-01 PROCEDURE — 80053 COMPREHEN METABOLIC PANEL: CPT | Performed by: STUDENT IN AN ORGANIZED HEALTH CARE EDUCATION/TRAINING PROGRAM

## 2021-08-01 PROCEDURE — G0378 HOSPITAL OBSERVATION PER HR: HCPCS

## 2021-08-01 PROCEDURE — 80076 HEPATIC FUNCTION PANEL: CPT | Performed by: STUDENT IN AN ORGANIZED HEALTH CARE EDUCATION/TRAINING PROGRAM

## 2021-08-01 PROCEDURE — 97165 OT EVAL LOW COMPLEX 30 MIN: CPT

## 2021-08-01 PROCEDURE — 97161 PT EVAL LOW COMPLEX 20 MIN: CPT

## 2021-08-01 PROCEDURE — 84100 ASSAY OF PHOSPHORUS: CPT | Performed by: STUDENT IN AN ORGANIZED HEALTH CARE EDUCATION/TRAINING PROGRAM

## 2021-08-01 PROCEDURE — 86022 PLATELET ANTIBODIES: CPT | Performed by: STUDENT IN AN ORGANIZED HEALTH CARE EDUCATION/TRAINING PROGRAM

## 2021-08-01 PROCEDURE — 63600175 PHARM REV CODE 636 W HCPCS: Performed by: STUDENT IN AN ORGANIZED HEALTH CARE EDUCATION/TRAINING PROGRAM

## 2021-08-01 PROCEDURE — 97116 GAIT TRAINING THERAPY: CPT

## 2021-08-01 PROCEDURE — 83735 ASSAY OF MAGNESIUM: CPT | Performed by: STUDENT IN AN ORGANIZED HEALTH CARE EDUCATION/TRAINING PROGRAM

## 2021-08-01 PROCEDURE — 85025 COMPLETE CBC W/AUTO DIFF WBC: CPT | Performed by: STUDENT IN AN ORGANIZED HEALTH CARE EDUCATION/TRAINING PROGRAM

## 2021-08-01 RX ORDER — DEXTROSE, SODIUM CHLORIDE, SODIUM LACTATE, POTASSIUM CHLORIDE, AND CALCIUM CHLORIDE 5; .6; .31; .03; .02 G/100ML; G/100ML; G/100ML; G/100ML; G/100ML
INJECTION, SOLUTION INTRAVENOUS CONTINUOUS
Status: DISCONTINUED | OUTPATIENT
Start: 2021-08-01 | End: 2021-08-02 | Stop reason: HOSPADM

## 2021-08-01 RX ADMIN — DEXTROSE, SODIUM CHLORIDE, SODIUM LACTATE, POTASSIUM CHLORIDE, AND CALCIUM CHLORIDE: 5; .6; .31; .03; .02 INJECTION, SOLUTION INTRAVENOUS at 09:08

## 2021-08-01 RX ADMIN — SODIUM CHLORIDE, SODIUM LACTATE, POTASSIUM CHLORIDE, AND CALCIUM CHLORIDE: .6; .31; .03; .02 INJECTION, SOLUTION INTRAVENOUS at 04:08

## 2021-08-01 RX ADMIN — DEXTROSE, SODIUM CHLORIDE, SODIUM LACTATE, POTASSIUM CHLORIDE, AND CALCIUM CHLORIDE: 5; .6; .31; .03; .02 INJECTION, SOLUTION INTRAVENOUS at 08:08

## 2021-08-02 ENCOUNTER — TELEPHONE (OUTPATIENT)
Dept: GASTROENTEROLOGY | Facility: HOSPITAL | Age: 76
End: 2021-08-02

## 2021-08-02 VITALS
HEART RATE: 66 BPM | SYSTOLIC BLOOD PRESSURE: 123 MMHG | BODY MASS INDEX: 19.23 KG/M2 | OXYGEN SATURATION: 98 % | RESPIRATION RATE: 18 BRPM | TEMPERATURE: 98 F | HEIGHT: 72 IN | WEIGHT: 142 LBS | DIASTOLIC BLOOD PRESSURE: 64 MMHG

## 2021-08-02 DIAGNOSIS — K85.00 IDIOPATHIC ACUTE PANCREATITIS WITHOUT INFECTION OR NECROSIS: Primary | ICD-10-CM

## 2021-08-02 PROBLEM — C80.1 CANCER: Chronic | Status: ACTIVE | Noted: 2021-08-02

## 2021-08-02 LAB
ALBUMIN SERPL BCP-MCNC: 2.3 G/DL (ref 3.5–5.2)
ALBUMIN SERPL BCP-MCNC: 2.8 G/DL (ref 3.5–5.2)
ALP SERPL-CCNC: 104 U/L (ref 55–135)
ALP SERPL-CCNC: 82 U/L (ref 55–135)
ALT SERPL W/O P-5'-P-CCNC: 25 U/L (ref 10–44)
ALT SERPL W/O P-5'-P-CCNC: 32 U/L (ref 10–44)
ANION GAP SERPL CALC-SCNC: 8 MMOL/L (ref 8–16)
AST SERPL-CCNC: 36 U/L (ref 10–40)
AST SERPL-CCNC: 48 U/L (ref 10–40)
BASOPHILS # BLD AUTO: 0 K/UL (ref 0–0.2)
BASOPHILS NFR BLD: 0 % (ref 0–1.9)
BILIRUB DIRECT SERPL-MCNC: 0.2 MG/DL (ref 0.1–0.3)
BILIRUB SERPL-MCNC: 0.4 MG/DL (ref 0.1–1)
BILIRUB SERPL-MCNC: 0.5 MG/DL (ref 0.1–1)
BUN SERPL-MCNC: 7 MG/DL (ref 8–23)
CALCIUM SERPL-MCNC: 9 MG/DL (ref 8.7–10.5)
CHLORIDE SERPL-SCNC: 110 MMOL/L (ref 95–110)
CO2 SERPL-SCNC: 23 MMOL/L (ref 23–29)
CREAT SERPL-MCNC: 0.9 MG/DL (ref 0.5–1.4)
DIFFERENTIAL METHOD: ABNORMAL
EOSINOPHIL # BLD AUTO: 0.1 K/UL (ref 0–0.5)
EOSINOPHIL NFR BLD: 1.2 % (ref 0–8)
ERYTHROCYTE [DISTWIDTH] IN BLOOD BY AUTOMATED COUNT: 15.8 % (ref 11.5–14.5)
EST. GFR  (AFRICAN AMERICAN): >60 ML/MIN/1.73 M^2
EST. GFR  (NON AFRICAN AMERICAN): >60 ML/MIN/1.73 M^2
GLUCOSE SERPL-MCNC: 93 MG/DL (ref 70–110)
HCT VFR BLD AUTO: 31.9 % (ref 37–48.5)
HGB BLD-MCNC: 10 G/DL (ref 12–16)
IMM GRANULOCYTES # BLD AUTO: 0.01 K/UL (ref 0–0.04)
IMM GRANULOCYTES NFR BLD AUTO: 0.2 % (ref 0–0.5)
LYMPHOCYTES # BLD AUTO: 1.2 K/UL (ref 1–4.8)
LYMPHOCYTES NFR BLD: 28.6 % (ref 18–48)
MAGNESIUM SERPL-MCNC: 2 MG/DL (ref 1.6–2.6)
MCH RBC QN AUTO: 24.4 PG (ref 27–31)
MCHC RBC AUTO-ENTMCNC: 31.3 G/DL (ref 32–36)
MCV RBC AUTO: 78 FL (ref 82–98)
MONOCYTES # BLD AUTO: 0.4 K/UL (ref 0.3–1)
MONOCYTES NFR BLD: 8.1 % (ref 4–15)
NEUTROPHILS # BLD AUTO: 2.7 K/UL (ref 1.8–7.7)
NEUTROPHILS NFR BLD: 61.9 % (ref 38–73)
NRBC BLD-RTO: 0 /100 WBC
PF4 HEPARIN CMPLX AB SER QL: 0.35 OD (ref 0–0.4)
PHOSPHATE SERPL-MCNC: 2.6 MG/DL (ref 2.7–4.5)
PLATELET # BLD AUTO: 96 K/UL (ref 150–450)
PLATELET BLD QL SMEAR: ABNORMAL
PMV BLD AUTO: ABNORMAL FL (ref 9.2–12.9)
POTASSIUM SERPL-SCNC: 3.8 MMOL/L (ref 3.5–5.1)
PROT SERPL-MCNC: 5.2 G/DL (ref 6–8.4)
PROT SERPL-MCNC: 6.5 G/DL (ref 6–8.4)
RBC # BLD AUTO: 4.1 M/UL (ref 4–5.4)
SODIUM SERPL-SCNC: 141 MMOL/L (ref 136–145)
WBC # BLD AUTO: 4.3 K/UL (ref 3.9–12.7)

## 2021-08-02 PROCEDURE — 84100 ASSAY OF PHOSPHORUS: CPT | Performed by: STUDENT IN AN ORGANIZED HEALTH CARE EDUCATION/TRAINING PROGRAM

## 2021-08-02 PROCEDURE — 97116 GAIT TRAINING THERAPY: CPT | Mod: CQ

## 2021-08-02 PROCEDURE — 25000003 PHARM REV CODE 250: Performed by: STUDENT IN AN ORGANIZED HEALTH CARE EDUCATION/TRAINING PROGRAM

## 2021-08-02 PROCEDURE — 36415 COLL VENOUS BLD VENIPUNCTURE: CPT | Performed by: STUDENT IN AN ORGANIZED HEALTH CARE EDUCATION/TRAINING PROGRAM

## 2021-08-02 PROCEDURE — 80053 COMPREHEN METABOLIC PANEL: CPT | Performed by: STUDENT IN AN ORGANIZED HEALTH CARE EDUCATION/TRAINING PROGRAM

## 2021-08-02 PROCEDURE — G0378 HOSPITAL OBSERVATION PER HR: HCPCS

## 2021-08-02 PROCEDURE — 99214 OFFICE O/P EST MOD 30 MIN: CPT | Mod: GC,,, | Performed by: INTERNAL MEDICINE

## 2021-08-02 PROCEDURE — 99214 PR OFFICE/OUTPT VISIT, EST, LEVL IV, 30-39 MIN: ICD-10-PCS | Mod: GC,,, | Performed by: INTERNAL MEDICINE

## 2021-08-02 PROCEDURE — 97110 THERAPEUTIC EXERCISES: CPT | Mod: CQ

## 2021-08-02 PROCEDURE — 85025 COMPLETE CBC W/AUTO DIFF WBC: CPT | Performed by: STUDENT IN AN ORGANIZED HEALTH CARE EDUCATION/TRAINING PROGRAM

## 2021-08-02 PROCEDURE — 83735 ASSAY OF MAGNESIUM: CPT | Performed by: STUDENT IN AN ORGANIZED HEALTH CARE EDUCATION/TRAINING PROGRAM

## 2021-08-02 PROCEDURE — 63600175 PHARM REV CODE 636 W HCPCS: Performed by: STUDENT IN AN ORGANIZED HEALTH CARE EDUCATION/TRAINING PROGRAM

## 2021-08-02 PROCEDURE — 96361 HYDRATE IV INFUSION ADD-ON: CPT | Mod: 59 | Performed by: EMERGENCY MEDICINE

## 2021-08-02 PROCEDURE — 80076 HEPATIC FUNCTION PANEL: CPT | Performed by: STUDENT IN AN ORGANIZED HEALTH CARE EDUCATION/TRAINING PROGRAM

## 2021-08-02 RX ORDER — OXYCODONE AND ACETAMINOPHEN 7.5; 325 MG/1; MG/1
1 TABLET ORAL EVERY 6 HOURS PRN
Qty: 21 TABLET | Refills: 0 | Status: SHIPPED | OUTPATIENT
Start: 2021-08-02 | End: 2021-08-09

## 2021-08-02 RX ADMIN — OXYCODONE AND ACETAMINOPHEN 1 TABLET: 7.5; 325 TABLET ORAL at 08:08

## 2021-08-02 RX ADMIN — DEXTROSE, SODIUM CHLORIDE, SODIUM LACTATE, POTASSIUM CHLORIDE, AND CALCIUM CHLORIDE: 5; .6; .31; .03; .02 INJECTION, SOLUTION INTRAVENOUS at 07:08

## 2021-08-03 ENCOUNTER — HOSPITAL ENCOUNTER (OUTPATIENT)
Dept: RADIOLOGY | Facility: HOSPITAL | Age: 76
Discharge: HOME | End: 2021-08-03
Attending: RADIOLOGY
Payer: MEDICARE

## 2021-08-03 ENCOUNTER — OFFICE VISIT (OUTPATIENT)
Dept: RADIATION ONCOLOGY | Facility: CLINIC | Age: 76
End: 2021-08-03
Payer: MEDICARE

## 2021-08-03 VITALS
TEMPERATURE: 98 F | WEIGHT: 138 LBS | HEIGHT: 72 IN | RESPIRATION RATE: 18 BRPM | SYSTOLIC BLOOD PRESSURE: 145 MMHG | OXYGEN SATURATION: 96 % | HEART RATE: 66 BPM | BODY MASS INDEX: 18.69 KG/M2 | DIASTOLIC BLOOD PRESSURE: 65 MMHG

## 2021-08-03 DIAGNOSIS — C34.92 ADENOCARCINOMA OF LEFT LUNG, STAGE 4: ICD-10-CM

## 2021-08-03 DIAGNOSIS — C34.92 ADENOCARCINOMA OF LEFT LUNG, STAGE 4: Primary | ICD-10-CM

## 2021-08-03 PROCEDURE — 3288F FALL RISK ASSESSMENT DOCD: CPT | Mod: CPTII,S$GLB,, | Performed by: RADIOLOGY

## 2021-08-03 PROCEDURE — 1101F PR PT FALLS ASSESS DOC 0-1 FALLS W/OUT INJ PAST YR: ICD-10-PCS | Mod: CPTII,S$GLB,, | Performed by: RADIOLOGY

## 2021-08-03 PROCEDURE — 1126F PR PAIN SEVERITY QUANTIFIED, NO PAIN PRESENT: ICD-10-PCS | Mod: CPTII,S$GLB,, | Performed by: RADIOLOGY

## 2021-08-03 PROCEDURE — 70553 MRI BRAIN STEM W/O & W/DYE: CPT | Mod: 26,,, | Performed by: RADIOLOGY

## 2021-08-03 PROCEDURE — A9585 GADOBUTROL INJECTION: HCPCS | Performed by: RADIOLOGY

## 2021-08-03 PROCEDURE — 3078F PR MOST RECENT DIASTOLIC BLOOD PRESSURE < 80 MM HG: ICD-10-PCS | Mod: CPTII,S$GLB,, | Performed by: RADIOLOGY

## 2021-08-03 PROCEDURE — 1159F PR MEDICATION LIST DOCUMENTED IN MEDICAL RECORD: ICD-10-PCS | Mod: CPTII,S$GLB,, | Performed by: RADIOLOGY

## 2021-08-03 PROCEDURE — 3078F DIAST BP <80 MM HG: CPT | Mod: CPTII,S$GLB,, | Performed by: RADIOLOGY

## 2021-08-03 PROCEDURE — 3288F PR FALLS RISK ASSESSMENT DOCUMENTED: ICD-10-PCS | Mod: CPTII,S$GLB,, | Performed by: RADIOLOGY

## 2021-08-03 PROCEDURE — 99213 PR OFFICE/OUTPT VISIT, EST, LEVL III, 20-29 MIN: ICD-10-PCS | Mod: S$GLB,,, | Performed by: RADIOLOGY

## 2021-08-03 PROCEDURE — 1101F PT FALLS ASSESS-DOCD LE1/YR: CPT | Mod: CPTII,S$GLB,, | Performed by: RADIOLOGY

## 2021-08-03 PROCEDURE — 99999 PR PBB SHADOW E&M-EST. PATIENT-LVL III: CPT | Mod: PBBFAC,,, | Performed by: RADIOLOGY

## 2021-08-03 PROCEDURE — 25500020 PHARM REV CODE 255: Performed by: RADIOLOGY

## 2021-08-03 PROCEDURE — 70553 MRI BRAIN W WO CONTRAST: ICD-10-PCS | Mod: 26,,, | Performed by: RADIOLOGY

## 2021-08-03 PROCEDURE — 3077F SYST BP >= 140 MM HG: CPT | Mod: CPTII,S$GLB,, | Performed by: RADIOLOGY

## 2021-08-03 PROCEDURE — 70553 MRI BRAIN STEM W/O & W/DYE: CPT | Mod: TC

## 2021-08-03 PROCEDURE — 1126F AMNT PAIN NOTED NONE PRSNT: CPT | Mod: CPTII,S$GLB,, | Performed by: RADIOLOGY

## 2021-08-03 PROCEDURE — 99999 PR PBB SHADOW E&M-EST. PATIENT-LVL III: ICD-10-PCS | Mod: PBBFAC,,, | Performed by: RADIOLOGY

## 2021-08-03 PROCEDURE — 3077F PR MOST RECENT SYSTOLIC BLOOD PRESSURE >= 140 MM HG: ICD-10-PCS | Mod: CPTII,S$GLB,, | Performed by: RADIOLOGY

## 2021-08-03 PROCEDURE — 1159F MED LIST DOCD IN RCRD: CPT | Mod: CPTII,S$GLB,, | Performed by: RADIOLOGY

## 2021-08-03 PROCEDURE — 99213 OFFICE O/P EST LOW 20 MIN: CPT | Mod: S$GLB,,, | Performed by: RADIOLOGY

## 2021-08-03 RX ORDER — GADOBUTROL 604.72 MG/ML
7 INJECTION INTRAVENOUS
Status: COMPLETED | OUTPATIENT
Start: 2021-08-03 | End: 2021-08-03

## 2021-08-03 RX ADMIN — GADOBUTROL 7 ML: 604.72 INJECTION INTRAVENOUS at 12:08

## 2021-08-10 ENCOUNTER — HOSPITAL ENCOUNTER (EMERGENCY)
Facility: HOSPITAL | Age: 76
Discharge: HOME | End: 2021-08-10
Attending: EMERGENCY MEDICINE
Payer: MEDICARE

## 2021-08-10 VITALS
DIASTOLIC BLOOD PRESSURE: 62 MMHG | RESPIRATION RATE: 18 BRPM | SYSTOLIC BLOOD PRESSURE: 123 MMHG | WEIGHT: 136 LBS | HEART RATE: 60 BPM | OXYGEN SATURATION: 99 % | TEMPERATURE: 98 F | BODY MASS INDEX: 18.44 KG/M2

## 2021-08-10 DIAGNOSIS — R10.10 UPPER ABDOMINAL PAIN: ICD-10-CM

## 2021-08-10 DIAGNOSIS — K86.1 IDIOPATHIC CHRONIC PANCREATITIS: Primary | ICD-10-CM

## 2021-08-10 LAB
ALBUMIN SERPL BCP-MCNC: 4.2 G/DL (ref 3.5–5.2)
ALP SERPL-CCNC: 116 U/L (ref 38–126)
ALT SERPL W/O P-5'-P-CCNC: 23 U/L (ref 10–44)
ANION GAP SERPL CALC-SCNC: 13 MMOL/L (ref 8–16)
AST SERPL-CCNC: 85 U/L (ref 15–46)
BASOPHILS # BLD AUTO: 0 K/UL (ref 0–0.2)
BASOPHILS NFR BLD: 0 % (ref 0–1.9)
BILIRUB SERPL-MCNC: 0.4 MG/DL (ref 0.1–1)
CALCIUM SERPL-MCNC: 9.1 MG/DL (ref 8.7–10.5)
CHLORIDE SERPL-SCNC: 105 MMOL/L (ref 95–110)
CO2 SERPL-SCNC: 25 MMOL/L (ref 23–29)
CREAT SERPL-MCNC: 0.8 MG/DL (ref 0.5–1.4)
DIFFERENTIAL METHOD: ABNORMAL
EOSINOPHIL # BLD AUTO: 0 K/UL (ref 0–0.5)
EOSINOPHIL NFR BLD: 0 % (ref 0–8)
ERYTHROCYTE [DISTWIDTH] IN BLOOD BY AUTOMATED COUNT: 16.6 % (ref 11.5–14.5)
EST. GFR  (AFRICAN AMERICAN): >60 ML/MIN/1.73 M^2
EST. GFR  (NON AFRICAN AMERICAN): >60 ML/MIN/1.73 M^2
GLUCOSE SERPL-MCNC: 155 MG/DL (ref 70–110)
HCT VFR BLD AUTO: 36.9 % (ref 37–48.5)
HGB BLD-MCNC: 11.4 G/DL (ref 12–16)
IMM GRANULOCYTES # BLD AUTO: 0.01 K/UL (ref 0–0.04)
IMM GRANULOCYTES NFR BLD AUTO: 0.2 % (ref 0–0.5)
LACTATE SERPL-SCNC: 1.9 MMOL/L (ref 0.5–2.2)
LIPASE SERPL-CCNC: >4000 U/L (ref 23–300)
LYMPHOCYTES # BLD AUTO: 0.4 K/UL (ref 1–4.8)
LYMPHOCYTES NFR BLD: 8.8 % (ref 18–48)
MCH RBC QN AUTO: 24.5 PG (ref 27–31)
MCHC RBC AUTO-ENTMCNC: 30.9 G/DL (ref 32–36)
MCV RBC AUTO: 79 FL (ref 82–98)
MONOCYTES # BLD AUTO: 0.1 K/UL (ref 0.3–1)
MONOCYTES NFR BLD: 2.4 % (ref 4–15)
NEUTROPHILS # BLD AUTO: 4.1 K/UL (ref 1.8–7.7)
NEUTROPHILS NFR BLD: 88.6 % (ref 38–73)
NRBC BLD-RTO: 0 /100 WBC
PLATELET # BLD AUTO: 151 K/UL (ref 150–450)
PMV BLD AUTO: 11.1 FL (ref 9.2–12.9)
POTASSIUM SERPL-SCNC: 3.9 MMOL/L (ref 3.5–5.1)
PROT SERPL-MCNC: 7.7 G/DL (ref 6–8.4)
RBC # BLD AUTO: 4.66 M/UL (ref 4–5.4)
SODIUM SERPL-SCNC: 143 MMOL/L (ref 136–145)
UUN UR-MCNC: 15 MG/DL (ref 7–17)
WBC # BLD AUTO: 4.64 K/UL (ref 3.9–12.7)

## 2021-08-10 PROCEDURE — 96361 HYDRATE IV INFUSION ADD-ON: CPT | Mod: ER

## 2021-08-10 PROCEDURE — 36000 PLACE NEEDLE IN VEIN: CPT | Mod: ER

## 2021-08-10 PROCEDURE — 80053 COMPREHEN METABOLIC PANEL: CPT | Mod: ER | Performed by: EMERGENCY MEDICINE

## 2021-08-10 PROCEDURE — 85025 COMPLETE CBC W/AUTO DIFF WBC: CPT | Mod: ER | Performed by: EMERGENCY MEDICINE

## 2021-08-10 PROCEDURE — 63600175 PHARM REV CODE 636 W HCPCS: Mod: ER | Performed by: EMERGENCY MEDICINE

## 2021-08-10 PROCEDURE — 93010 EKG 12-LEAD: ICD-10-PCS | Mod: ,,, | Performed by: INTERNAL MEDICINE

## 2021-08-10 PROCEDURE — 83605 ASSAY OF LACTIC ACID: CPT | Mod: ER | Performed by: EMERGENCY MEDICINE

## 2021-08-10 PROCEDURE — 83690 ASSAY OF LIPASE: CPT | Mod: ER | Performed by: EMERGENCY MEDICINE

## 2021-08-10 PROCEDURE — 96374 THER/PROPH/DIAG INJ IV PUSH: CPT | Mod: ER

## 2021-08-10 PROCEDURE — 93005 ELECTROCARDIOGRAM TRACING: CPT | Mod: ER

## 2021-08-10 PROCEDURE — 99284 EMERGENCY DEPT VISIT MOD MDM: CPT | Mod: 25,ER

## 2021-08-10 PROCEDURE — 25000003 PHARM REV CODE 250: Mod: ER | Performed by: EMERGENCY MEDICINE

## 2021-08-10 PROCEDURE — 93010 ELECTROCARDIOGRAM REPORT: CPT | Mod: ,,, | Performed by: INTERNAL MEDICINE

## 2021-08-10 PROCEDURE — 96375 TX/PRO/DX INJ NEW DRUG ADDON: CPT | Mod: ER

## 2021-08-10 RX ORDER — HYDROMORPHONE HYDROCHLORIDE 1 MG/ML
1 INJECTION, SOLUTION INTRAMUSCULAR; INTRAVENOUS; SUBCUTANEOUS
Status: COMPLETED | OUTPATIENT
Start: 2021-08-10 | End: 2021-08-10

## 2021-08-10 RX ORDER — OXYCODONE AND ACETAMINOPHEN 5; 325 MG/1; MG/1
2 TABLET ORAL
Status: COMPLETED | OUTPATIENT
Start: 2021-08-10 | End: 2021-08-10

## 2021-08-10 RX ORDER — OXYCODONE AND ACETAMINOPHEN 7.5; 325 MG/1; MG/1
1 TABLET ORAL EVERY 6 HOURS PRN
Qty: 12 TABLET | Refills: 0 | Status: SHIPPED | OUTPATIENT
Start: 2021-08-10 | End: 2023-04-02

## 2021-08-10 RX ORDER — ONDANSETRON 4 MG/1
4 TABLET, ORALLY DISINTEGRATING ORAL EVERY 8 HOURS PRN
Qty: 12 TABLET | Refills: 0 | Status: ON HOLD | OUTPATIENT
Start: 2021-08-10 | End: 2021-12-27

## 2021-08-10 RX ORDER — ONDANSETRON 2 MG/ML
8 INJECTION INTRAMUSCULAR; INTRAVENOUS
Status: COMPLETED | OUTPATIENT
Start: 2021-08-10 | End: 2021-08-10

## 2021-08-10 RX ADMIN — OXYCODONE HYDROCHLORIDE AND ACETAMINOPHEN 2 TABLET: 5; 325 TABLET ORAL at 08:08

## 2021-08-10 RX ADMIN — HYDROMORPHONE HYDROCHLORIDE 1 MG: 1 INJECTION, SOLUTION INTRAMUSCULAR; INTRAVENOUS; SUBCUTANEOUS at 07:08

## 2021-08-10 RX ADMIN — SODIUM CHLORIDE 500 ML: 0.9 INJECTION, SOLUTION INTRAVENOUS at 07:08

## 2021-08-10 RX ADMIN — ONDANSETRON 8 MG: 2 INJECTION INTRAMUSCULAR; INTRAVENOUS at 07:08

## 2021-08-12 ENCOUNTER — TELEPHONE (OUTPATIENT)
Dept: GASTROENTEROLOGY | Facility: CLINIC | Age: 76
End: 2021-08-12

## 2021-08-13 ENCOUNTER — PATIENT MESSAGE (OUTPATIENT)
Dept: GASTROENTEROLOGY | Facility: CLINIC | Age: 76
End: 2021-08-13

## 2021-08-16 ENCOUNTER — SPECIALTY PHARMACY (OUTPATIENT)
Dept: PHARMACY | Facility: CLINIC | Age: 76
End: 2021-08-16

## 2021-08-16 ENCOUNTER — LAB VISIT (OUTPATIENT)
Dept: LAB | Facility: HOSPITAL | Age: 76
End: 2021-08-16
Attending: INTERNAL MEDICINE
Payer: MEDICARE

## 2021-08-16 ENCOUNTER — TELEPHONE (OUTPATIENT)
Dept: HEMATOLOGY/ONCOLOGY | Facility: CLINIC | Age: 76
End: 2021-08-16

## 2021-08-16 DIAGNOSIS — M94.9 DISORDER OF CARTILAGE, UNSPECIFIED: ICD-10-CM

## 2021-08-16 DIAGNOSIS — E44.1 MILD PROTEIN-CALORIE MALNUTRITION: ICD-10-CM

## 2021-08-16 DIAGNOSIS — C34.92 ADENOCARCINOMA OF LEFT LUNG, STAGE 4: ICD-10-CM

## 2021-08-16 LAB
25(OH)D3+25(OH)D2 SERPL-MCNC: 72 NG/ML (ref 30–96)
ALBUMIN SERPL BCP-MCNC: 4 G/DL (ref 3.5–5.2)
ALP SERPL-CCNC: 142 U/L (ref 38–126)
ALT SERPL W/O P-5'-P-CCNC: 18 U/L (ref 10–44)
ANION GAP SERPL CALC-SCNC: 10 MMOL/L (ref 8–16)
AST SERPL-CCNC: 44 U/L (ref 15–46)
BASOPHILS # BLD AUTO: 0.01 K/UL (ref 0–0.2)
BASOPHILS NFR BLD: 0.3 % (ref 0–1.9)
BILIRUB SERPL-MCNC: 0.5 MG/DL (ref 0.1–1)
CALCIUM SERPL-MCNC: 9.8 MG/DL (ref 8.7–10.5)
CHLORIDE SERPL-SCNC: 108 MMOL/L (ref 95–110)
CO2 SERPL-SCNC: 25 MMOL/L (ref 23–29)
CREAT SERPL-MCNC: 0.97 MG/DL (ref 0.5–1.4)
DIFFERENTIAL METHOD: ABNORMAL
EOSINOPHIL # BLD AUTO: 0 K/UL (ref 0–0.5)
EOSINOPHIL NFR BLD: 1 % (ref 0–8)
ERYTHROCYTE [DISTWIDTH] IN BLOOD BY AUTOMATED COUNT: 16.4 % (ref 11.5–14.5)
EST. GFR  (AFRICAN AMERICAN): >60 ML/MIN/1.73 M^2
EST. GFR  (NON AFRICAN AMERICAN): 56.9 ML/MIN/1.73 M^2
GLUCOSE SERPL-MCNC: 86 MG/DL (ref 70–110)
HCT VFR BLD AUTO: 37.9 % (ref 37–48.5)
HGB BLD-MCNC: 11.7 G/DL (ref 12–16)
IMM GRANULOCYTES # BLD AUTO: 0.01 K/UL (ref 0–0.04)
IMM GRANULOCYTES NFR BLD AUTO: 0.3 % (ref 0–0.5)
LYMPHOCYTES # BLD AUTO: 1.4 K/UL (ref 1–4.8)
LYMPHOCYTES NFR BLD: 47.4 % (ref 18–48)
MCH RBC QN AUTO: 24 PG (ref 27–31)
MCHC RBC AUTO-ENTMCNC: 30.9 G/DL (ref 32–36)
MCV RBC AUTO: 78 FL (ref 82–98)
MONOCYTES # BLD AUTO: 0.4 K/UL (ref 0.3–1)
MONOCYTES NFR BLD: 11.9 % (ref 4–15)
NEUTROPHILS # BLD AUTO: 1.2 K/UL (ref 1.8–7.7)
NEUTROPHILS NFR BLD: 39.1 % (ref 38–73)
NRBC BLD-RTO: 0 /100 WBC
PLATELET # BLD AUTO: 187 K/UL (ref 150–450)
PMV BLD AUTO: 11.3 FL (ref 9.2–12.9)
POTASSIUM SERPL-SCNC: 4 MMOL/L (ref 3.5–5.1)
PROT SERPL-MCNC: 8 G/DL (ref 6–8.4)
RBC # BLD AUTO: 4.87 M/UL (ref 4–5.4)
SODIUM SERPL-SCNC: 143 MMOL/L (ref 136–145)
TSH SERPL DL<=0.005 MIU/L-ACNC: 1.39 UIU/ML (ref 0.4–4)
UUN UR-MCNC: 13 MG/DL (ref 7–17)
WBC # BLD AUTO: 3.02 K/UL (ref 3.9–12.7)

## 2021-08-16 PROCEDURE — 82306 VITAMIN D 25 HYDROXY: CPT | Mod: PO | Performed by: INTERNAL MEDICINE

## 2021-08-16 PROCEDURE — 80053 COMPREHEN METABOLIC PANEL: CPT | Mod: PO | Performed by: INTERNAL MEDICINE

## 2021-08-16 PROCEDURE — 85025 COMPLETE CBC W/AUTO DIFF WBC: CPT | Mod: PO | Performed by: INTERNAL MEDICINE

## 2021-08-16 PROCEDURE — 36415 COLL VENOUS BLD VENIPUNCTURE: CPT | Mod: PO | Performed by: INTERNAL MEDICINE

## 2021-08-17 ENCOUNTER — PATIENT MESSAGE (OUTPATIENT)
Dept: GASTROENTEROLOGY | Facility: CLINIC | Age: 76
End: 2021-08-17

## 2021-08-17 ENCOUNTER — TELEPHONE (OUTPATIENT)
Dept: GASTROENTEROLOGY | Facility: CLINIC | Age: 76
End: 2021-08-17

## 2021-08-20 ENCOUNTER — HOSPITAL ENCOUNTER (OUTPATIENT)
Dept: RADIOLOGY | Facility: HOSPITAL | Age: 76
Discharge: HOME | End: 2021-08-20
Attending: INTERNAL MEDICINE
Payer: MEDICARE

## 2021-08-20 ENCOUNTER — TELEPHONE (OUTPATIENT)
Dept: HEMATOLOGY/ONCOLOGY | Facility: CLINIC | Age: 76
End: 2021-08-20

## 2021-08-20 DIAGNOSIS — C34.32 MALIGNANT NEOPLASM OF LOWER LOBE OF LEFT LUNG: ICD-10-CM

## 2021-08-20 DIAGNOSIS — C34.92 ADENOCARCINOMA OF LEFT LUNG, STAGE 4: ICD-10-CM

## 2021-08-23 ENCOUNTER — TELEPHONE (OUTPATIENT)
Dept: HEMATOLOGY/ONCOLOGY | Facility: CLINIC | Age: 76
End: 2021-08-23

## 2021-08-23 ENCOUNTER — TELEPHONE (OUTPATIENT)
Dept: ENDOSCOPY | Facility: HOSPITAL | Age: 76
End: 2021-08-23

## 2021-08-23 ENCOUNTER — HOSPITAL ENCOUNTER (OUTPATIENT)
Dept: RADIOLOGY | Facility: HOSPITAL | Age: 76
Discharge: HOME | End: 2021-08-23
Attending: INTERNAL MEDICINE
Payer: MEDICARE

## 2021-08-23 PROCEDURE — 78815 PET IMAGE W/CT SKULL-THIGH: CPT | Mod: TC

## 2021-08-23 PROCEDURE — 78815 PET IMAGE W/CT SKULL-THIGH: CPT | Mod: 26,PS,, | Performed by: RADIOLOGY

## 2021-08-23 PROCEDURE — 78815 NM PET CT ROUTINE: ICD-10-PCS | Mod: 26,PS,, | Performed by: RADIOLOGY

## 2021-08-26 ENCOUNTER — EXTERNAL HOME HEALTH (OUTPATIENT)
Dept: HOME HEALTH SERVICES | Facility: HOSPITAL | Age: 76
End: 2021-08-26

## 2021-09-18 ENCOUNTER — SPECIALTY PHARMACY (OUTPATIENT)
Dept: PHARMACY | Facility: CLINIC | Age: 76
End: 2021-09-18

## 2021-09-27 ENCOUNTER — OFFICE VISIT (OUTPATIENT)
Dept: HEMATOLOGY/ONCOLOGY | Facility: CLINIC | Age: 76
End: 2021-09-27
Payer: MEDICARE

## 2021-09-27 VITALS
OXYGEN SATURATION: 100 % | TEMPERATURE: 98 F | BODY MASS INDEX: 18.36 KG/M2 | RESPIRATION RATE: 18 BRPM | WEIGHT: 135.56 LBS | SYSTOLIC BLOOD PRESSURE: 106 MMHG | HEIGHT: 72 IN | HEART RATE: 59 BPM | DIASTOLIC BLOOD PRESSURE: 63 MMHG

## 2021-09-27 DIAGNOSIS — K86.1 IDIOPATHIC CHRONIC PANCREATITIS: ICD-10-CM

## 2021-09-27 DIAGNOSIS — C34.92 ADENOCARCINOMA OF LEFT LUNG, STAGE 4: Primary | ICD-10-CM

## 2021-09-27 DIAGNOSIS — C79.31 BRAIN METASTASIS: ICD-10-CM

## 2021-09-27 PROCEDURE — 3288F FALL RISK ASSESSMENT DOCD: CPT | Mod: CPTII,S$GLB,, | Performed by: INTERNAL MEDICINE

## 2021-09-27 PROCEDURE — 1126F PR PAIN SEVERITY QUANTIFIED, NO PAIN PRESENT: ICD-10-PCS | Mod: CPTII,S$GLB,, | Performed by: INTERNAL MEDICINE

## 2021-09-27 PROCEDURE — 3074F PR MOST RECENT SYSTOLIC BLOOD PRESSURE < 130 MM HG: ICD-10-PCS | Mod: CPTII,S$GLB,, | Performed by: INTERNAL MEDICINE

## 2021-09-27 PROCEDURE — 3078F DIAST BP <80 MM HG: CPT | Mod: CPTII,S$GLB,, | Performed by: INTERNAL MEDICINE

## 2021-09-27 PROCEDURE — 1101F PR PT FALLS ASSESS DOC 0-1 FALLS W/OUT INJ PAST YR: ICD-10-PCS | Mod: CPTII,S$GLB,, | Performed by: INTERNAL MEDICINE

## 2021-09-27 PROCEDURE — 99999 PR PBB SHADOW E&M-EST. PATIENT-LVL IV: CPT | Mod: PBBFAC,,, | Performed by: INTERNAL MEDICINE

## 2021-09-27 PROCEDURE — 1159F PR MEDICATION LIST DOCUMENTED IN MEDICAL RECORD: ICD-10-PCS | Mod: CPTII,S$GLB,, | Performed by: INTERNAL MEDICINE

## 2021-09-27 PROCEDURE — 1160F PR REVIEW ALL MEDS BY PRESCRIBER/CLIN PHARMACIST DOCUMENTED: ICD-10-PCS | Mod: CPTII,S$GLB,, | Performed by: INTERNAL MEDICINE

## 2021-09-27 PROCEDURE — 3288F PR FALLS RISK ASSESSMENT DOCUMENTED: ICD-10-PCS | Mod: CPTII,S$GLB,, | Performed by: INTERNAL MEDICINE

## 2021-09-27 PROCEDURE — 1101F PT FALLS ASSESS-DOCD LE1/YR: CPT | Mod: CPTII,S$GLB,, | Performed by: INTERNAL MEDICINE

## 2021-09-27 PROCEDURE — 1160F RVW MEDS BY RX/DR IN RCRD: CPT | Mod: CPTII,S$GLB,, | Performed by: INTERNAL MEDICINE

## 2021-09-27 PROCEDURE — 1126F AMNT PAIN NOTED NONE PRSNT: CPT | Mod: CPTII,S$GLB,, | Performed by: INTERNAL MEDICINE

## 2021-09-27 PROCEDURE — 99215 OFFICE O/P EST HI 40 MIN: CPT | Mod: S$GLB,,, | Performed by: INTERNAL MEDICINE

## 2021-09-27 PROCEDURE — 3074F SYST BP LT 130 MM HG: CPT | Mod: CPTII,S$GLB,, | Performed by: INTERNAL MEDICINE

## 2021-09-27 PROCEDURE — 3078F PR MOST RECENT DIASTOLIC BLOOD PRESSURE < 80 MM HG: ICD-10-PCS | Mod: CPTII,S$GLB,, | Performed by: INTERNAL MEDICINE

## 2021-09-27 PROCEDURE — 99999 PR PBB SHADOW E&M-EST. PATIENT-LVL IV: ICD-10-PCS | Mod: PBBFAC,,, | Performed by: INTERNAL MEDICINE

## 2021-09-27 PROCEDURE — 99215 PR OFFICE/OUTPT VISIT, EST, LEVL V, 40-54 MIN: ICD-10-PCS | Mod: S$GLB,,, | Performed by: INTERNAL MEDICINE

## 2021-09-27 PROCEDURE — 1159F MED LIST DOCD IN RCRD: CPT | Mod: CPTII,S$GLB,, | Performed by: INTERNAL MEDICINE

## 2021-10-22 ENCOUNTER — SPECIALTY PHARMACY (OUTPATIENT)
Dept: PHARMACY | Facility: CLINIC | Age: 76
End: 2021-10-22

## 2021-11-11 ENCOUNTER — TELEPHONE (OUTPATIENT)
Dept: ENDOSCOPY | Facility: HOSPITAL | Age: 76
End: 2021-11-11
Payer: MEDICARE

## 2021-11-19 ENCOUNTER — TELEPHONE (OUTPATIENT)
Dept: ENDOSCOPY | Facility: HOSPITAL | Age: 76
End: 2021-11-19
Payer: MEDICARE

## 2021-11-19 ENCOUNTER — SPECIALTY PHARMACY (OUTPATIENT)
Dept: PHARMACY | Facility: CLINIC | Age: 76
End: 2021-11-19
Payer: MEDICARE

## 2021-11-19 DIAGNOSIS — C34.92 ADENOCARCINOMA OF LEFT LUNG, STAGE 4: ICD-10-CM

## 2021-11-23 ENCOUNTER — HOSPITAL ENCOUNTER (OUTPATIENT)
Facility: HOSPITAL | Age: 76
Discharge: HOME | End: 2021-11-23
Attending: INTERNAL MEDICINE | Admitting: INTERNAL MEDICINE
Payer: MEDICARE

## 2021-11-23 ENCOUNTER — SPECIALTY PHARMACY (OUTPATIENT)
Dept: PHARMACY | Facility: CLINIC | Age: 76
End: 2021-11-23
Payer: MEDICARE

## 2021-11-23 ENCOUNTER — ANESTHESIA (OUTPATIENT)
Dept: ENDOSCOPY | Facility: HOSPITAL | Age: 76
End: 2021-11-23
Payer: MEDICARE

## 2021-11-23 ENCOUNTER — ANESTHESIA EVENT (OUTPATIENT)
Dept: ENDOSCOPY | Facility: HOSPITAL | Age: 76
End: 2021-11-23
Payer: MEDICARE

## 2021-11-23 VITALS
SYSTOLIC BLOOD PRESSURE: 104 MMHG | HEIGHT: 72 IN | RESPIRATION RATE: 20 BRPM | HEART RATE: 60 BPM | BODY MASS INDEX: 16.93 KG/M2 | DIASTOLIC BLOOD PRESSURE: 61 MMHG | WEIGHT: 125 LBS | OXYGEN SATURATION: 100 % | TEMPERATURE: 98 F

## 2021-11-23 DIAGNOSIS — K85.10 ACUTE BILIARY PANCREATITIS WITHOUT INFECTION OR NECROSIS: ICD-10-CM

## 2021-11-23 PROCEDURE — 43259 EGD US EXAM DUODENUM/JEJUNUM: CPT | Performed by: INTERNAL MEDICINE

## 2021-11-23 PROCEDURE — 25000003 PHARM REV CODE 250: Performed by: INTERNAL MEDICINE

## 2021-11-23 PROCEDURE — 43259 PR ENDOSCOPIC ULTRASOUND EXAM: ICD-10-PCS | Mod: ,,, | Performed by: INTERNAL MEDICINE

## 2021-11-23 PROCEDURE — 43259 EGD US EXAM DUODENUM/JEJUNUM: CPT | Mod: ,,, | Performed by: INTERNAL MEDICINE

## 2021-11-23 PROCEDURE — 25000003 PHARM REV CODE 250: Performed by: NURSE ANESTHETIST, CERTIFIED REGISTERED

## 2021-11-23 PROCEDURE — 63600175 PHARM REV CODE 636 W HCPCS: Performed by: NURSE ANESTHETIST, CERTIFIED REGISTERED

## 2021-11-23 PROCEDURE — 37000009 HC ANESTHESIA EA ADD 15 MINS: Performed by: INTERNAL MEDICINE

## 2021-11-23 PROCEDURE — 37000008 HC ANESTHESIA 1ST 15 MINUTES: Performed by: INTERNAL MEDICINE

## 2021-11-23 RX ORDER — LIDOCAINE HYDROCHLORIDE 20 MG/ML
INJECTION INTRAVENOUS
Status: DISCONTINUED | OUTPATIENT
Start: 2021-11-23 | End: 2021-11-23

## 2021-11-23 RX ORDER — PROPOFOL 10 MG/ML
VIAL (ML) INTRAVENOUS
Status: DISCONTINUED | OUTPATIENT
Start: 2021-11-23 | End: 2021-11-23

## 2021-11-23 RX ORDER — SODIUM CHLORIDE 0.9 % (FLUSH) 0.9 %
10 SYRINGE (ML) INJECTION
Status: DISCONTINUED | OUTPATIENT
Start: 2021-11-23 | End: 2021-11-23 | Stop reason: HOSPADM

## 2021-11-23 RX ORDER — SODIUM CHLORIDE 9 MG/ML
INJECTION, SOLUTION INTRAVENOUS CONTINUOUS
Status: DISCONTINUED | OUTPATIENT
Start: 2021-11-23 | End: 2021-11-23 | Stop reason: HOSPADM

## 2021-11-23 RX ADMIN — LIDOCAINE HYDROCHLORIDE 60 MG: 20 INJECTION, SOLUTION INTRAVENOUS at 09:11

## 2021-11-23 RX ADMIN — PROPOFOL 60 MG: 10 INJECTION, EMULSION INTRAVENOUS at 09:11

## 2021-11-23 RX ADMIN — PROPOFOL 30 MG: 10 INJECTION, EMULSION INTRAVENOUS at 09:11

## 2021-11-23 RX ADMIN — SODIUM CHLORIDE: 0.9 INJECTION, SOLUTION INTRAVENOUS at 09:11

## 2021-11-24 ENCOUNTER — TELEPHONE (OUTPATIENT)
Dept: ENDOSCOPY | Facility: HOSPITAL | Age: 76
End: 2021-11-24
Payer: MEDICARE

## 2021-12-21 ENCOUNTER — SPECIALTY PHARMACY (OUTPATIENT)
Dept: PHARMACY | Facility: CLINIC | Age: 76
End: 2021-12-21
Payer: MEDICARE

## 2021-12-26 ENCOUNTER — HOSPITAL ENCOUNTER (EMERGENCY)
Facility: HOSPITAL | Age: 76
Discharge: HOME | End: 2021-12-26
Attending: EMERGENCY MEDICINE
Payer: MEDICARE

## 2021-12-26 VITALS
HEART RATE: 55 BPM | OXYGEN SATURATION: 100 % | TEMPERATURE: 99 F | DIASTOLIC BLOOD PRESSURE: 50 MMHG | RESPIRATION RATE: 20 BRPM | SYSTOLIC BLOOD PRESSURE: 107 MMHG

## 2021-12-26 DIAGNOSIS — R10.9 ABDOMINAL PAIN: ICD-10-CM

## 2021-12-26 DIAGNOSIS — R10.10 UPPER ABDOMINAL PAIN: Primary | ICD-10-CM

## 2021-12-26 LAB
ALBUMIN SERPL BCP-MCNC: 4.2 G/DL (ref 3.5–5.2)
ALP SERPL-CCNC: 101 U/L (ref 38–126)
ALT SERPL W/O P-5'-P-CCNC: 20 U/L (ref 10–44)
ANION GAP SERPL CALC-SCNC: 8 MMOL/L (ref 8–16)
AST SERPL-CCNC: 95 U/L (ref 15–46)
BASOPHILS # BLD AUTO: 0.01 K/UL (ref 0–0.2)
BASOPHILS NFR BLD: 0.2 % (ref 0–1.9)
BILIRUB SERPL-MCNC: 0.5 MG/DL (ref 0.1–1)
CALCIUM SERPL-MCNC: 9.4 MG/DL (ref 8.7–10.5)
CHLORIDE SERPL-SCNC: 107 MMOL/L (ref 95–110)
CO2 SERPL-SCNC: 27 MMOL/L (ref 23–29)
CREAT SERPL-MCNC: 0.97 MG/DL (ref 0.5–1.4)
DIFFERENTIAL METHOD: ABNORMAL
EOSINOPHIL # BLD AUTO: 0.1 K/UL (ref 0–0.5)
EOSINOPHIL NFR BLD: 1 % (ref 0–8)
ERYTHROCYTE [DISTWIDTH] IN BLOOD BY AUTOMATED COUNT: 17.2 % (ref 11.5–14.5)
EST. GFR  (AFRICAN AMERICAN): >60 ML/MIN/1.73 M^2
EST. GFR  (NON AFRICAN AMERICAN): 56.5 ML/MIN/1.73 M^2
GLUCOSE SERPL-MCNC: 103 MG/DL (ref 70–110)
HCT VFR BLD AUTO: 38.1 % (ref 37–48.5)
HGB BLD-MCNC: 11.7 G/DL (ref 12–16)
IMM GRANULOCYTES # BLD AUTO: 0.01 K/UL (ref 0–0.04)
IMM GRANULOCYTES NFR BLD AUTO: 0.2 % (ref 0–0.5)
LIPASE SERPL-CCNC: >4000 U/L (ref 23–300)
LYMPHOCYTES # BLD AUTO: 0.9 K/UL (ref 1–4.8)
LYMPHOCYTES NFR BLD: 16.4 % (ref 18–48)
MCH RBC QN AUTO: 24.2 PG (ref 27–31)
MCHC RBC AUTO-ENTMCNC: 30.7 G/DL (ref 32–36)
MCV RBC AUTO: 79 FL (ref 82–98)
MONOCYTES # BLD AUTO: 0.3 K/UL (ref 0.3–1)
MONOCYTES NFR BLD: 6.5 % (ref 4–15)
NEUTROPHILS # BLD AUTO: 4 K/UL (ref 1.8–7.7)
NEUTROPHILS NFR BLD: 75.7 % (ref 38–73)
NRBC BLD-RTO: 0 /100 WBC
PLATELET # BLD AUTO: 102 K/UL (ref 150–450)
PMV BLD AUTO: ABNORMAL FL (ref 9.2–12.9)
POTASSIUM SERPL-SCNC: 4.7 MMOL/L (ref 3.5–5.1)
PROT SERPL-MCNC: 7.9 G/DL (ref 6–8.4)
RBC # BLD AUTO: 4.84 M/UL (ref 4–5.4)
SODIUM SERPL-SCNC: 142 MMOL/L (ref 136–145)
UUN UR-MCNC: 20 MG/DL (ref 7–17)
WBC # BLD AUTO: 5.23 K/UL (ref 3.9–12.7)

## 2021-12-26 PROCEDURE — 93010 ELECTROCARDIOGRAM REPORT: CPT | Mod: ,,, | Performed by: INTERNAL MEDICINE

## 2021-12-26 PROCEDURE — 93005 ELECTROCARDIOGRAM TRACING: CPT | Mod: ER

## 2021-12-26 PROCEDURE — 93010 EKG 12-LEAD: ICD-10-PCS | Mod: ,,, | Performed by: INTERNAL MEDICINE

## 2021-12-26 PROCEDURE — 85025 COMPLETE CBC W/AUTO DIFF WBC: CPT | Mod: ER | Performed by: EMERGENCY MEDICINE

## 2021-12-26 PROCEDURE — 83690 ASSAY OF LIPASE: CPT | Mod: ER | Performed by: EMERGENCY MEDICINE

## 2021-12-26 PROCEDURE — 99285 EMERGENCY DEPT VISIT HI MDM: CPT | Mod: 25,ER

## 2021-12-26 PROCEDURE — 80053 COMPREHEN METABOLIC PANEL: CPT | Mod: ER | Performed by: EMERGENCY MEDICINE

## 2021-12-27 ENCOUNTER — HOSPITAL ENCOUNTER (OUTPATIENT)
Facility: HOSPITAL | Age: 76
Discharge: HOME | End: 2021-12-28
Attending: EMERGENCY MEDICINE | Admitting: INTERNAL MEDICINE
Payer: MEDICARE

## 2021-12-27 ENCOUNTER — TELEPHONE (OUTPATIENT)
Dept: ENDOSCOPY | Facility: HOSPITAL | Age: 76
End: 2021-12-27
Payer: MEDICARE

## 2021-12-27 DIAGNOSIS — K86.1 CHRONIC PANCREATITIS, UNSPECIFIED PANCREATITIS TYPE: Primary | ICD-10-CM

## 2021-12-27 DIAGNOSIS — R10.9 ABDOMINAL PAIN: ICD-10-CM

## 2021-12-27 LAB
ALBUMIN SERPL BCP-MCNC: 3.7 G/DL (ref 3.5–5.2)
ALP SERPL-CCNC: 95 U/L (ref 55–135)
ALT SERPL W/O P-5'-P-CCNC: 17 U/L (ref 10–44)
ANION GAP SERPL CALC-SCNC: 8 MMOL/L (ref 8–16)
AST SERPL-CCNC: 36 U/L (ref 10–40)
BASOPHILS # BLD AUTO: 0.01 K/UL (ref 0–0.2)
BASOPHILS NFR BLD: 0.2 % (ref 0–1.9)
BILIRUB SERPL-MCNC: 0.3 MG/DL (ref 0.1–1)
BUN SERPL-MCNC: 17 MG/DL (ref 8–23)
CALCIUM SERPL-MCNC: 9.3 MG/DL (ref 8.7–10.5)
CHLORIDE SERPL-SCNC: 107 MMOL/L (ref 95–110)
CO2 SERPL-SCNC: 23 MMOL/L (ref 23–29)
CREAT SERPL-MCNC: 1.1 MG/DL (ref 0.5–1.4)
CTP QC/QA: YES
DIFFERENTIAL METHOD: ABNORMAL
EOSINOPHIL # BLD AUTO: 0 K/UL (ref 0–0.5)
EOSINOPHIL NFR BLD: 0.7 % (ref 0–8)
ERYTHROCYTE [DISTWIDTH] IN BLOOD BY AUTOMATED COUNT: 17.3 % (ref 11.5–14.5)
EST. GFR  (AFRICAN AMERICAN): 56 ML/MIN/1.73 M^2
EST. GFR  (NON AFRICAN AMERICAN): 49 ML/MIN/1.73 M^2
GLUCOSE SERPL-MCNC: 88 MG/DL (ref 70–110)
HCT VFR BLD AUTO: 37.2 % (ref 37–48.5)
HGB BLD-MCNC: 11.6 G/DL (ref 12–16)
IMM GRANULOCYTES # BLD AUTO: 0.01 K/UL (ref 0–0.04)
IMM GRANULOCYTES NFR BLD AUTO: 0.2 % (ref 0–0.5)
LIPASE SERPL-CCNC: 438 U/L (ref 4–60)
LYMPHOCYTES # BLD AUTO: 0.8 K/UL (ref 1–4.8)
LYMPHOCYTES NFR BLD: 14 % (ref 18–48)
MAGNESIUM SERPL-MCNC: 2.4 MG/DL (ref 1.6–2.6)
MCH RBC QN AUTO: 24.4 PG (ref 27–31)
MCHC RBC AUTO-ENTMCNC: 31.2 G/DL (ref 32–36)
MCV RBC AUTO: 78 FL (ref 82–98)
MONOCYTES # BLD AUTO: 0.4 K/UL (ref 0.3–1)
MONOCYTES NFR BLD: 6.3 % (ref 4–15)
NEUTROPHILS # BLD AUTO: 4.4 K/UL (ref 1.8–7.7)
NEUTROPHILS NFR BLD: 78.6 % (ref 38–73)
NRBC BLD-RTO: 0 /100 WBC
PLATELET # BLD AUTO: 100 K/UL (ref 150–450)
PMV BLD AUTO: ABNORMAL FL (ref 9.2–12.9)
POCT GLUCOSE: 74 MG/DL (ref 70–110)
POTASSIUM SERPL-SCNC: 3.6 MMOL/L (ref 3.5–5.1)
PROT SERPL-MCNC: 7.2 G/DL (ref 6–8.4)
RBC # BLD AUTO: 4.75 M/UL (ref 4–5.4)
SARS-COV-2 RDRP RESP QL NAA+PROBE: NEGATIVE
SODIUM SERPL-SCNC: 138 MMOL/L (ref 136–145)
TROPONIN I SERPL DL<=0.01 NG/ML-MCNC: <0.006 NG/ML (ref 0–0.03)
WBC # BLD AUTO: 5.57 K/UL (ref 3.9–12.7)

## 2021-12-27 PROCEDURE — 83690 ASSAY OF LIPASE: CPT | Performed by: NURSE PRACTITIONER

## 2021-12-27 PROCEDURE — 96374 THER/PROPH/DIAG INJ IV PUSH: CPT

## 2021-12-27 PROCEDURE — 85025 COMPLETE CBC W/AUTO DIFF WBC: CPT | Performed by: NURSE PRACTITIONER

## 2021-12-27 PROCEDURE — G0378 HOSPITAL OBSERVATION PER HR: HCPCS

## 2021-12-27 PROCEDURE — U0002 COVID-19 LAB TEST NON-CDC: HCPCS | Performed by: EMERGENCY MEDICINE

## 2021-12-27 PROCEDURE — 99285 EMERGENCY DEPT VISIT HI MDM: CPT | Mod: 25

## 2021-12-27 PROCEDURE — 84484 ASSAY OF TROPONIN QUANT: CPT | Performed by: NURSE PRACTITIONER

## 2021-12-27 PROCEDURE — 96361 HYDRATE IV INFUSION ADD-ON: CPT

## 2021-12-27 PROCEDURE — 25000003 PHARM REV CODE 250: Performed by: EMERGENCY MEDICINE

## 2021-12-27 PROCEDURE — 83735 ASSAY OF MAGNESIUM: CPT | Performed by: NURSE PRACTITIONER

## 2021-12-27 PROCEDURE — 63600175 PHARM REV CODE 636 W HCPCS: Performed by: EMERGENCY MEDICINE

## 2021-12-27 PROCEDURE — 82962 GLUCOSE BLOOD TEST: CPT

## 2021-12-27 PROCEDURE — 80053 COMPREHEN METABOLIC PANEL: CPT | Performed by: NURSE PRACTITIONER

## 2021-12-27 RX ORDER — MORPHINE SULFATE 4 MG/ML
4 INJECTION, SOLUTION INTRAMUSCULAR; INTRAVENOUS
Status: COMPLETED | OUTPATIENT
Start: 2021-12-27 | End: 2021-12-27

## 2021-12-27 RX ORDER — PROMETHAZINE HYDROCHLORIDE 25 MG/1
25 TABLET ORAL EVERY 8 HOURS PRN
Status: DISCONTINUED | OUTPATIENT
Start: 2021-12-27 | End: 2021-12-28 | Stop reason: HOSPADM

## 2021-12-27 RX ORDER — SODIUM CHLORIDE 0.9 % (FLUSH) 0.9 %
10 SYRINGE (ML) INJECTION EVERY 12 HOURS PRN
Status: DISCONTINUED | OUTPATIENT
Start: 2021-12-27 | End: 2021-12-28 | Stop reason: HOSPADM

## 2021-12-27 RX ORDER — SODIUM CHLORIDE, SODIUM LACTATE, POTASSIUM CHLORIDE, CALCIUM CHLORIDE 600; 310; 30; 20 MG/100ML; MG/100ML; MG/100ML; MG/100ML
INJECTION, SOLUTION INTRAVENOUS CONTINUOUS
Status: DISCONTINUED | OUTPATIENT
Start: 2021-12-27 | End: 2021-12-28

## 2021-12-27 RX ORDER — IBUPROFEN 200 MG
24 TABLET ORAL
Status: DISCONTINUED | OUTPATIENT
Start: 2021-12-27 | End: 2021-12-28 | Stop reason: HOSPADM

## 2021-12-27 RX ORDER — NALOXONE HCL 0.4 MG/ML
0.02 VIAL (ML) INJECTION
Status: DISCONTINUED | OUTPATIENT
Start: 2021-12-27 | End: 2021-12-28 | Stop reason: HOSPADM

## 2021-12-27 RX ORDER — GLUCAGON 1 MG
1 KIT INJECTION
Status: DISCONTINUED | OUTPATIENT
Start: 2021-12-27 | End: 2021-12-28 | Stop reason: HOSPADM

## 2021-12-27 RX ORDER — IBUPROFEN 200 MG
16 TABLET ORAL
Status: DISCONTINUED | OUTPATIENT
Start: 2021-12-27 | End: 2021-12-28 | Stop reason: HOSPADM

## 2021-12-27 RX ORDER — OXYCODONE AND ACETAMINOPHEN 7.5; 325 MG/1; MG/1
1 TABLET ORAL EVERY 6 HOURS PRN
Status: DISCONTINUED | OUTPATIENT
Start: 2021-12-27 | End: 2021-12-28 | Stop reason: HOSPADM

## 2021-12-27 RX ADMIN — MORPHINE SULFATE 4 MG: 4 INJECTION INTRAVENOUS at 02:12

## 2021-12-27 RX ADMIN — SODIUM CHLORIDE 500 ML: 0.9 INJECTION, SOLUTION INTRAVENOUS at 02:12

## 2021-12-27 RX ADMIN — SODIUM CHLORIDE, SODIUM LACTATE, POTASSIUM CHLORIDE, AND CALCIUM CHLORIDE: .6; .31; .03; .02 INJECTION, SOLUTION INTRAVENOUS at 05:12

## 2021-12-27 NOTE — ED NOTES
Patient is a 77 yo female who presents with recurrent abdominal pain nausea and emesis. Patient with hx of Pancreatitis.
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
## 2021-12-27 NOTE — FIRST PROVIDER EVALUATION
Emergency Department TeleTriage Encounter Note      CHIEF COMPLAINT    Chief Complaint   Patient presents with    Abdominal Pain     Pt has hx of pancreatitis, complains of nausea   and emesis was seen in ER yesterday for same mild  relief from RX meds        VITAL SIGNS   Initial Vitals [12/27/21 1231]   BP Pulse Resp Temp SpO2   (!) 89/42 (!) 53 18 97.5 °F (36.4 °C) --      MAP       --            ALLERGIES    Review of patient's allergies indicates:   Allergen Reactions    Heparin analogues Other (See Comments)     HIT panel pending        PROVIDER TRIAGE NOTE  This is a teletriage evaluation of a 76 y.o. female presenting to the ED complaining of abd pain and nausea.  Pt seen in the ED for the same yesterday. Pt has pmhx of pancreatitis. Yesterday, CT showed intrahepatic biliary dilation, ERCP was recommended.  Pt DC'd home with percocet and promethazine.  Pt started vomiting again today which resolved after taking promethazine.     Initial orders will be placed and care will be transferred to an alternate provider when patient is roomed for a full evaluation. Any additional orders and the final disposition will be determined by that provider.           ORDERS  Labs Reviewed   CBC W/ AUTO DIFFERENTIAL   COMPREHENSIVE METABOLIC PANEL   LIPASE   TROPONIN I   POCT GLUCOSE MONITORING CONTINUOUS       ED Orders (720h ago, onward)    Start Ordered     Status Ordering Provider    12/27/21 1240 12/27/21 1240  CBC auto differential  STAT         Ordered JENNY KYLE N.    12/27/21 1240 12/27/21 1240  Comprehensive metabolic panel  STAT         Ordered JENNY KYLE N.    12/27/21 1240 12/27/21 1240  Lipase  STAT         Ordered JUNE KYLEIKA N.    12/27/21 1240 12/27/21 1240  Troponin I  STAT         Ordered SCHNISAELMALIK LOMBARDOSSIKA N.    12/27/21 1240 12/27/21 1240  Cardiac Monitoring - Adult  Continuous        Comments: Notify Physician If:    Ordered JENNY KYLE N.    12/27/21  1240 12/27/21 1240  Pulse Oximetry Continuous  Continuous         Ordered JENNY KYLE N.    12/27/21 1240 12/27/21 1240  POCT glucose  Once         Ordered JENNY KYLE N.    12/27/21 1239 12/27/21 1240  Insert Saline lock IV  Once         Ordered JENNY KYLE N.    12/27/21 1239 12/27/21 1240  EKG 12-lead  Once         Ordered JENNY KYLE            Virtual Visit Note: The provider triage portion of this emergency department evaluation and documentation was performed via [employer], a HIPAA-compliant telemedicine application, in concert with a tele-presenter in the room. A face to face patient evaluation with one of my colleagues will occur once the patient is placed in an emergency department room.      DISCLAIMER: This note was prepared with [employer] voice recognition transcription software. Garbled syntax, mangled pronouns, and other bizarre constructions may be attributed to that software system.
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
## 2021-12-27 NOTE — TELEPHONE ENCOUNTER
----- Message from Almita Chacko sent at 12/27/2021 11:23 AM CST -----  Regarding: Appointment Request  Regarding:Pt's daughter called to schedule pt with doctor for a follow up visit from Er for pts inflamed  pancreas. Pt states pt is still not well and would like first available appointment. Or to speak  with doctor.    Requesting Call back number:[phone] Fidelina

## 2021-12-27 NOTE — PLAN OF CARE
SW spoke with pt and Fidelina Simpson (Daughter) [phone] at bedside. SW used exploration with pt to discuss possibilities with pt about PCP and prescription refills. Pt states that Fidelina will transport her at time of discharge. No DME needs identified at this time. SW provided information on dry erase board and encouraged them both to reach out for any needs.       12/27/21 2095   Discharge Assessment   Assessment Type Discharge Planning Assessment   Confirmed/corrected address, phone number and insurance Yes   Source of Information patient;family   Lives With child(david), adult   Do you expect to return to your current living situation? Yes   Do you have help at home or someone to help you manage your care at home? Yes   Who are your caregiver(s) and their phone number(s)? Fidelina Simpson (Daughter)   [phone]   Prior to hospitilization cognitive status: Alert/Oriented   Current cognitive status: Alert/Oriented

## 2021-12-28 VITALS
DIASTOLIC BLOOD PRESSURE: 55 MMHG | HEIGHT: 72 IN | SYSTOLIC BLOOD PRESSURE: 92 MMHG | BODY MASS INDEX: 17.41 KG/M2 | WEIGHT: 128.5 LBS | RESPIRATION RATE: 18 BRPM | TEMPERATURE: 98 F | HEART RATE: 66 BPM | OXYGEN SATURATION: 98 %

## 2021-12-28 PROCEDURE — 25000003 PHARM REV CODE 250: Performed by: STUDENT IN AN ORGANIZED HEALTH CARE EDUCATION/TRAINING PROGRAM

## 2021-12-28 PROCEDURE — 63600175 PHARM REV CODE 636 W HCPCS: Performed by: STUDENT IN AN ORGANIZED HEALTH CARE EDUCATION/TRAINING PROGRAM

## 2021-12-28 PROCEDURE — 91300 PHARM REV CODE 636 W HCPCS: CPT | Performed by: INTERNAL MEDICINE

## 2021-12-28 PROCEDURE — 0004A HC IMMUNIZ ADMIN, SARS-COV-2 COVID-19 VACC, 30MCG/0.3ML, BOOSTER DOSE: CPT | Performed by: INTERNAL MEDICINE

## 2021-12-28 PROCEDURE — 63600175 PHARM REV CODE 636 W HCPCS: Performed by: INTERNAL MEDICINE

## 2021-12-28 PROCEDURE — 96361 HYDRATE IV INFUSION ADD-ON: CPT

## 2021-12-28 PROCEDURE — G0378 HOSPITAL OBSERVATION PER HR: HCPCS

## 2021-12-28 RX ORDER — POLYETHYLENE GLYCOL 3350 17 G/17G
17 POWDER, FOR SOLUTION ORAL DAILY
Status: DISCONTINUED | OUTPATIENT
Start: 2021-12-28 | End: 2021-12-28 | Stop reason: HOSPADM

## 2021-12-28 RX ADMIN — SODIUM CHLORIDE, SODIUM LACTATE, POTASSIUM CHLORIDE, AND CALCIUM CHLORIDE: .6; .31; .03; .02 INJECTION, SOLUTION INTRAVENOUS at 03:12

## 2021-12-28 RX ADMIN — RNA INGREDIENT BNT-162B2 0.3 ML: 0.23 INJECTION, SUSPENSION INTRAMUSCULAR at 02:12

## 2021-12-28 RX ADMIN — POLYETHYLENE GLYCOL 3350 17 G: 17 POWDER, FOR SOLUTION ORAL at 12:12

## 2021-12-28 NOTE — PLAN OF CARE
Problem: Adult Inpatient Plan of Care  Goal: Plan of Care Review  Chart reviewed and care plan initiated

## 2021-12-28 NOTE — PROGRESS NOTES
Follow-up With  Details  Why  Contact Info   Edilia Carrillo MD  On 1/6/2022  2:00 pm   [address] [facility] [address] [phone]   Boby Bob MD    You will be contacted by the office with an appointment per  [address] [address] [phone]

## 2021-12-28 NOTE — NURSING
Patient for discharge today. Pt's BP low, pt asymptomatic,  Dr Jonas navarrete Md still OK to d/c patient.  PIV and tele removed. AVS printed and reviewed with patient. Patient's diet advanced to regular, pt received COVID vaccine booster. Will continue to monitor pt before d/c.

## 2021-12-28 NOTE — TELEPHONE ENCOUNTER
----- Message from Amy Briceno sent at 12/28/2021  1:40 PM CST -----  Needs advice from nurse:      Who Called:case managment  Regarding:patient was seen in hospital for pancreatitis and needs a hospital f/u, discharged today  Would the patient rather a call back or [facility]IA [facility]?  Best Call Back number:[phone]  Additional Info:

## 2021-12-28 NOTE — PROGRESS NOTES
"[facility] Medicine Progress Note    Primary Team: [facility] Hospitalist Team A  Attending Physician: Flaco Castro MD  Resident: Jovanny Mcdaniel MD  Intern: Nicolasa    Subjective:      Patient slept well last night. Denied further abdominal pain, nausea, vomiting, or lightheadedness.      Objective:     Last 24 Hour Vital Signs:  BP  Min: 89/42  Max: 122/55  Temp  Av.6 °F (36.4 °C)  Min: 97.2 °F (36.2 °C)  Max: 98 °F (36.7 °C)  Pulse  Av.8  Min: 52  Max: 64  Resp  Av.1  Min: 10  Max: 22  SpO2  Av.7 %  Min: 99 %  Max: 100 %  Height  Av' 1" (185.4 cm)  Min: 6' 1" (185.4 cm)  Max: 6' 1" (185.4 cm)  Weight  Av.6 kg (124 lb 11.7 oz)  Min: 54.4 kg (120 lb)  Max: 58.3 kg (128 lb 8.5 oz)  I/O last 3 completed shifts:  In: 1221.3 [I.V.:721.3; IV Piggyback:500]  Out: -     Physical Examination:  Physical Exam  Constitutional:       General: She is not in acute distress.     Appearance: She is not ill-appearing, toxic-appearing or diaphoretic.      Comments: thin   HENT:      Head: Normocephalic.      Nose: Nose normal.      Mouth/Throat:      Mouth: Mucous membranes are moist.      Pharynx: No oropharyngeal exudate.   Eyes:      Extraocular Movements: Extraocular movements intact.   Cardiovascular:      Rate and Rhythm: Regular rhythm. normal rate present.      Pulses: Normal pulses.      Heart sounds: No murmur heard.  Pulmonary:      Effort: Pulmonary effort is normal. No respiratory distress.      Breath sounds: No wheezing or rales.   Abdominal:      General: Abdomen is flat. There is no distension.      Palpations: Abdomen is soft.      Tenderness: Non-tender to palpation   Musculoskeletal:         General: No swelling. Normal range of motion.      Cervical back: Normal range of motion and neck supple. No rigidity.      Right lower leg: No edema.      Left lower leg: No edema.   Skin:     General: Skin is warm and dry.      Findings: No rash.   Neurological:      General: No focal deficit " present.      Mental Status: She is alert and oriented to person, place, and time.   Psychiatric:         Mood and Affect: Mood normal.         Behavior: Behavior normal.     Laboratory:  Laboratory Data Reviewed: yes  Pertinent Findings:  None      Current Medications:     Infusions:       Scheduled:   osimertinib  80 mg Oral Daily        PRN:  dextrose 50%, dextrose 50%, glucagon (human recombinant), glucose, glucose, naloxone, oxyCODONE-acetaminophen, promethazine, sars-cov-2 (covid-19), sodium chloride 0.9%      Assessment:     Nathan Simpson is a 76 y.o.female with  Patient Active Problem List    Diagnosis Date Noted    Cancer 08/02/2021    Acute pancreatitis 07/29/2021    Adenocarcinoma of left lung, stage 4 EGFR positive 05/24/2021    Abdominal pain 02/21/2021    HLD (hyperlipidemia) 01/10/2016    Intractable nausea and vomiting 09/15/2015    Dementia due to head trauma 08/07/2014    Idiopathic chronic pancreatitis 07/24/2014    Pancreatic mass 07/04/2014        Assessment/ Plan:     Acute exacerbation of chronic idiopathic pancreatitis  -patient had 2 days of abdominal pain, nausea, decreased p.o. intake, did report 1 episode of vomiting.  Initially improved at [facility] ER but symptoms worsened again after eating cereal with milk following discharge from ED.  -recent EUS showed dilated bile ducts  - hypertensive on admit, responded to fluid bolus, received maintenance fluids overnight. Bps stable  - abdominal tenderness has resolved  -continue home dose Percocet and Phenergan as patient reports that she responds well to these  - full liquid diet, low-fat  -right upper quadrant ultrasound showed gallbladder wall thickening and hyperemia but negative sonographic Hernandez sign  - anticipate discharge this morning   -advised patient to avoid milk during exacerbations     Adenocarcinoma of left lung, stage 4   - Continue osimertinib 80mg Daily      Dispo:  Placed in observation for treatment of  pancreatitis   Diet:  Full liquid, low     Code:  Full Code   DVT PPX: SCDs, patient has KATELYN panel pending.patient ambulatory, Anticipate discharge today.       Jovanny Mcdaniel MD  [facility] Internal Medicine -II    [facility] Medicine Hospitalist Pager numbers:   [facility] Hospitalist Medicine Team A (Danuta/Yvonne): [phone]  [facility] Hospitalist Medicine Team B (Matthew/Hussein):  [phone]

## 2021-12-28 NOTE — PROGRESS NOTES
12/27/21 2240   Admission   Initial VN Admission Questions Complete   Shift   Virtual Nurse - Patient Verbalized Approval Of Camera Use;VN Rounding   Safety/Activity   Patient Rounds bed in low position;call light in patient/parent reach;clutter free environment maintained;visualized patient;placement of personal items at bedside   Safety Promotion/Fall Prevention assistive device/personal item within reach;bed alarm set;Fall Risk reviewed with patient/family;family to remain at bedside;side rails raised x 2   Positioning   Body Position supine   Head of Bed (HOB) Positioning HOB at 30-45 degrees   VN cued in to pt's room to complete admission questions. Daughter, Fidelina, at bedside. Admission questions completed with pt and daughter. Plan of care reviewed. Denies any questions, concerns,needs at this time. Instructed to call for needs/assist oob. Pt and daughter verbalize understanding. Call bell w/in reach
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
## 2021-12-28 NOTE — PLAN OF CARE
Earlier admit from ED with daughter,KELL,denies c/o pain,ivf in progress,poc review done stated understanding,bed alarm set.

## 2021-12-28 NOTE — DISCHARGE SUMMARY
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    Pancreatic mass    Idiopathic acute pancreatitis    Intractable nausea and vomiting    HLD (hyperlipidemia)    Abdominal pain    Adenocarcinoma of left lung, stage 4 EGFR positive    Acute pancreatitis    Cancer       Consultants and Procedures     US Abdomen Limited [[id number]] Resulted: 12/27/21 1525   Order Status: Completed Updated: 12/27/21 1528   Narrative:     EXAMINATION:   US ABDOMEN LIMITED     CLINICAL HISTORY:   query cholecystitis;     TECHNIQUE:   Limited ultrasound of the right upper quadrant of the abdomen (including pancreas, liver, gallbladder, common bile duct, and spleen) was performed.     COMPARISON:   02/23/2021, CT 12/26/2021, PET-CT 08/23/2021     FINDINGS:   The pancreas is obscured by overlying soft tissues.  There is dilation of the pancreatic duct measuring 5-6 mm, similar to CT 07/30/2021.  There is intrahepatic biliary dilation.  The gallbladder is distended noting adherent calcific foci along the anterior wall, largest measuring 5-6 mm, suggesting adherent calculi.  There is mild gallbladder wall hyperemia noting gallbladder wall thickening measuring up to 0.6 cm.  The common duct is dilated measuring up to 1.3 cm, similar to the previous CT.  The hepatic parenchyma is heterogeneous, the liver is not enlarged.  Spleen is unremarkable.  Incidental note is made of a cyst within the upper pole of the left kidney measuring up to 5.3 cm.  The right kidney is grossly unremarkable.  No ascites.    Impression:       Intra and extrahepatic biliary dilation, similar to the previous examination.  There is continued  gallbladder wall thickening with gallbladder wall hyperemia, also appears similar to previous examinations noting adherent calculi.  Sonographic Hernandez sign is reportedly negative.  Overall, findings suggest chronic gallbladder changes, possibly in the setting of underlying hepatic disease.  If there is continued concern for acute cholecystitis, HIDA scan can be considered as warranted.  Gallbladder malignancy is not excluded in this setting, ERCP as warranted.       Electronically signed by: Justino Orantes MD   Date: 12/27/2021   Time: 15:25     Brief History of Present Illness      Nathan Simpson is a 76 y.o.  female who  has a past medical history of metastatic breast cancer on osimetinib, and chronic idiopathic pancreatitis who presented to the emergency room today the chief complaint of abdominal pain, nausea, vomiting for 2 days.  Patient has a history of chronic pancreatitis for which she is prescribed Percocet and Phenergan.  Patient has also visually impaired and lives with her daughter who is a primary caretaker.  Per patient's daughter for the past several months every time the patient gets abdominal pain she takes Percocet which usually resolves the pain.  This happens approximately 1-2 times per week.  Yesterday patient started to experience pain and went to the freestanding ER.  Pain improved and she was discharged home.  This morning when she woke up she had cereal with milk (which she has every day) and noticed recurrence of the abdominal pain so she presented to the ED. She also had one episode of vomiting. The pain is mid epigastric and of moderate intensity. In the emergency room patient was noted to be hypotensive and was given a fluid bolus and was noted to have a lipase of 438.     Patient was given IV fluid overnight and remained stable. The next morning her pain and nausea had improved and she reported she was ready to go home. Patient had blood pressures in the high 90s systolic but was  asymptomatic and ambulatory.     Hospital Course By Problem with Pertinent Findings     Acute exacerbation of chronic idiopathic pancreatitis  -patient had 2 days of abdominal pain, nausea, decreased p.o. intake, did report 1 episode of vomiting.  Initially improved at [facility] ER but symptoms worsened again after eating cereal with milk following discharge from ED.  -recent EUS showed dilated bile ducts  - hypertensive on admit, responded to fluid bolus, received maintenance fluids overnight. Bps stable  - abdominal tenderness has resolved  -continue home dose Percocet and Phenergan as patient reports that she responds well to these  - full liquid diet, low-fat  -right upper quadrant ultrasound showed gallbladder wall thickening and hyperemia but negative sonographic Hernandez sign  -advised patient to avoid milk during exacerbations     Adenocarcinoma of left lung, stage 4   - Continue osimertinib 80mg Daily    Discharge Medications        Medication List      CHANGE how you take these medications    TAGRISSO 80 mg Tab  Generic drug: osimertinib  Take  1 tablet (80 mg) by mouth once daily.  What changed:   · when to take this  · additional instructions        CONTINUE taking these medications    cholecalciferol (vitamin D3) 1,250 mcg (50,000 unit) Tab  Take 50,000 Units by mouth once a week.     diclofenac 1.3 % Pt12  Commonly known as: FLECTOR     oxyCODONE-acetaminophen 7.5-325 mg per tablet  Commonly known as: PERCOCET  Take 1 tablet by mouth every 6 (six) hours as needed for Pain.     promethazine 25 MG tablet  Commonly known as: PHENERGAN  Take 1 tablet (25 mg total) by mouth every 8 (eight) hours as needed for Nausea.            Discharge Information:   Diet:  Low Fat    Physical Activity:  As tolerated              Instructions:  1. Take all medications as prescribed  2. Keep all follow-up appointments  3. Return to the hospital or call your primary care physicians if any worsening symptoms such as  fever, chest pain, shortness of breath, return of symptoms, or any other concerns.  4. Maintain good po intake    Follow-Up Appointments:  With PCP   Referral to GI placed for follow up of chronic idiopathic pancreatitis    Jovanny Mcdaniel MD  [facility], [facility]

## 2021-12-29 NOTE — PLAN OF CARE
Edilia Carrillo MD  On 1/3/2022  2:30 pm ---Monday   [address] [facility] [address]  [phone]   Boby Bob MD    You will be contacted by the office with an appointment per  [address]  [address]  [facility] [address]  [phone]            12/28/21 1813   Final Note   Assessment Type Final Discharge Note   Anticipated Discharge Disposition Home   What phone number can be called within the next 1-3 days to see how you are doing after discharge? [phone]   Hospital Resources/Appts/Education Provided Appointments scheduled and added to AVS   Post-Acute Status   Discharge Delays None known at this time

## 2021-12-29 NOTE — PLAN OF CARE
[place] met with pt and daughter Fidelina Simpson  [phone]   pt is independent - no hh; has a travel WC   pharmacy - [facility] - able to buy meds      pt has a state provided sitter to assist with ADL's  5 days/wk  4 hours/day 9a-2:30 pm   Future Appointments   Date Time Provider Department Center   1/10/2022  8:45 AM [facility] CT1 LIMIT 400 LBS [facility] CT SCAN [facility]   1/10/2022  9:30 AM [facility] CT1 LIMIT 400 LBS [facility]PH CT SCAN [facility]   1/10/2022 10:20 AM LAB, [facility] LAB [facility]   1/24/2022  1:00 PM Bruno Copeland MD [facility] HEM ONC [facility]        12/28/21 1811   Discharge Planning   Assessment Type Discharge Planning Brief Assessment   Resource/Environmental Concerns none   Support Systems Children   Equipment Currently Used at Home wheelchair  (transport wc)   Current Living Arrangements home/apartment/condo   Patient/Family Anticipates Transition to home;home with family   Patient/Family Anticipated Services at Transition none   DME Needed Upon Discharge  none   Discharge Plan A Home;Home with family

## 2022-01-10 ENCOUNTER — HOSPITAL ENCOUNTER (OUTPATIENT)
Dept: RADIOLOGY | Facility: HOSPITAL | Age: 77
Discharge: HOME | End: 2022-01-10
Attending: INTERNAL MEDICINE
Payer: MEDICARE

## 2022-01-10 DIAGNOSIS — C34.92 ADENOCARCINOMA OF LEFT LUNG, STAGE 4: ICD-10-CM

## 2022-01-10 PROCEDURE — 71260 CT THORAX DX C+: CPT | Mod: TC,PO

## 2022-01-10 PROCEDURE — 25500020 PHARM REV CODE 255: Mod: PO | Performed by: INTERNAL MEDICINE

## 2022-01-10 PROCEDURE — 74177 CT ABD & PELVIS W/CONTRAST: CPT | Mod: TC,PO

## 2022-01-10 RX ADMIN — IOHEXOL 30 ML: 300 INJECTION, SOLUTION INTRAVENOUS at 08:01

## 2022-01-10 RX ADMIN — IOHEXOL 100 ML: 350 INJECTION, SOLUTION INTRAVENOUS at 09:01

## 2022-01-18 ENCOUNTER — SPECIALTY PHARMACY (OUTPATIENT)
Dept: PHARMACY | Facility: CLINIC | Age: 77
End: 2022-01-18
Payer: MEDICARE

## 2022-01-18 NOTE — TELEPHONE ENCOUNTER
Specialty Pharmacy - Refill Coordination    Patient's arcelia Kitchen states she had a visit to the ER visit on 12/27 for pancreatitis. Pancreatitis flare has since resolved.     Specialty Medication Orders Linked to Encounter    Flowsheet Row Most Recent Value   Medication #1 osimertinib (TAGRISSO) 80 mg Tab (Order#[id number], Rx#[id number])          Refill Questions - Documented Responses    Flowsheet Row Most Recent Value   Patient Availability and HIPAA Verification    Does patient want to proceed with activity? Yes   HIPAA/medical authority confirmed? Yes   Relationship to patient of person spoken to? Child   Refill Screening Questions    Changes to allergies? No   Changes to medications? No   New conditions since last clinic visit? No   Unplanned office visit, urgent care, ED, or hospital admission in the last 4 weeks? Yes  [ER visit on 12/27 for pancreatitis.]   How does patient/caregiver feel medication is working? Good   Financial problems or insurance changes? No   How many doses of your specialty medications were missed in the last 4 weeks? 0   Would patient like to speak to a pharmacist? No   When does the patient need to receive the medication? 01/23/22   Refill Delivery Questions    How will the patient receive the medication? Delivery Emily   When does the patient need to receive the medication? 01/23/22   Shipping Address Home   Address in [facility] confirmed and updated if neccessary? Yes   Expected Copay ($) 0   Is the patient able to afford the medication copay? Yes   Payment Method zero copay   Days supply of Refill 30   Supplies needed? No supplies needed   Refill activity completed? Yes   Refill activity plan Refill scheduled   Shipment/Pickup Date: 01/20/22          Current Outpatient Medications   Medication Sig    cholecalciferol, vitamin D3, 1,250 mcg (50,000 unit) Tab Take 50,000 Units by mouth once a week.    diclofenac (FLECTOR) 1.3 % PT12 Apply 1 patch topically daily as needed.     osimertinib (TAGRISSO) 80 mg Tab Take  1 tablet (80 mg) by mouth once daily. (Patient taking differently: Take 80 mg by mouth every evening At 7 pm.)    oxyCODONE-acetaminophen (PERCOCET) 7.5-325 mg per tablet Take 1 tablet by mouth every 6 (six) hours as needed for Pain.    promethazine (PHENERGAN) 25 MG tablet Take 1 tablet (25 mg total) by mouth every 8 (eight) hours as needed for Nausea.   Last reviewed on 12/27/2021 11:02 PM by Amy Villela RN    Review of patient's allergies indicates:   Allergen Reactions    Heparin analogues Other (See Comments)     HIT panel pending     Last reviewed on  1/10/2022 9:12 AM by Janet Villatoro      Tasks added this encounter   No tasks added.   Tasks due within next 3 months   2/14/2022 - Clinical - Follow Up Assesement (90 day)  1/17/2022 - Refill Call (Auto Added)     TONY GUPTA, PharmD  [facility]  [address] [place], [place] [address]  [place] [address]  Phone: [phone]  Fax: [phone]

## 2022-01-24 ENCOUNTER — OFFICE VISIT (OUTPATIENT)
Dept: HEMATOLOGY/ONCOLOGY | Facility: CLINIC | Age: 77
End: 2022-01-24
Payer: MEDICARE

## 2022-01-24 VITALS
OXYGEN SATURATION: 99 % | WEIGHT: 127 LBS | SYSTOLIC BLOOD PRESSURE: 97 MMHG | HEIGHT: 72 IN | RESPIRATION RATE: 18 BRPM | BODY MASS INDEX: 17.2 KG/M2 | TEMPERATURE: 98 F | HEART RATE: 66 BPM | DIASTOLIC BLOOD PRESSURE: 53 MMHG

## 2022-01-24 DIAGNOSIS — C34.92 ADENOCARCINOMA OF LEFT LUNG, STAGE 4: ICD-10-CM

## 2022-01-24 DIAGNOSIS — D69.59 CHEMOTHERAPY-INDUCED THROMBOCYTOPENIA: ICD-10-CM

## 2022-01-24 DIAGNOSIS — R11.2 CHEMOTHERAPY-INDUCED NAUSEA AND VOMITING: ICD-10-CM

## 2022-01-24 DIAGNOSIS — K85.00 IDIOPATHIC ACUTE PANCREATITIS, UNSPECIFIED COMPLICATION STATUS: Primary | ICD-10-CM

## 2022-01-24 DIAGNOSIS — T45.1X5A CHEMOTHERAPY-INDUCED NAUSEA AND VOMITING: ICD-10-CM

## 2022-01-24 DIAGNOSIS — T45.1X5A CHEMOTHERAPY-INDUCED THROMBOCYTOPENIA: ICD-10-CM

## 2022-01-24 PROBLEM — R10.9 ABDOMINAL PAIN: Status: RESOLVED | Noted: 2021-02-21 | Resolved: 2022-01-24

## 2022-01-24 PROBLEM — K85.90 ACUTE PANCREATITIS: Status: RESOLVED | Noted: 2021-07-29 | Resolved: 2022-01-24

## 2022-01-24 PROCEDURE — 3078F DIAST BP <80 MM HG: CPT | Mod: CPTII,S$GLB,, | Performed by: INTERNAL MEDICINE

## 2022-01-24 PROCEDURE — 1101F PT FALLS ASSESS-DOCD LE1/YR: CPT | Mod: CPTII,S$GLB,, | Performed by: INTERNAL MEDICINE

## 2022-01-24 PROCEDURE — 3074F PR MOST RECENT SYSTOLIC BLOOD PRESSURE < 130 MM HG: ICD-10-PCS | Mod: CPTII,S$GLB,, | Performed by: INTERNAL MEDICINE

## 2022-01-24 PROCEDURE — 1159F PR MEDICATION LIST DOCUMENTED IN MEDICAL RECORD: ICD-10-PCS | Mod: CPTII,S$GLB,, | Performed by: INTERNAL MEDICINE

## 2022-01-24 PROCEDURE — 3288F FALL RISK ASSESSMENT DOCD: CPT | Mod: CPTII,S$GLB,, | Performed by: INTERNAL MEDICINE

## 2022-01-24 PROCEDURE — 1101F PR PT FALLS ASSESS DOC 0-1 FALLS W/OUT INJ PAST YR: ICD-10-PCS | Mod: CPTII,S$GLB,, | Performed by: INTERNAL MEDICINE

## 2022-01-24 PROCEDURE — 1125F AMNT PAIN NOTED PAIN PRSNT: CPT | Mod: CPTII,S$GLB,, | Performed by: INTERNAL MEDICINE

## 2022-01-24 PROCEDURE — 3288F PR FALLS RISK ASSESSMENT DOCUMENTED: ICD-10-PCS | Mod: CPTII,S$GLB,, | Performed by: INTERNAL MEDICINE

## 2022-01-24 PROCEDURE — 99999 PR PBB SHADOW E&M-EST. PATIENT-LVL III: ICD-10-PCS | Mod: PBBFAC,,, | Performed by: INTERNAL MEDICINE

## 2022-01-24 PROCEDURE — 1160F PR REVIEW ALL MEDS BY PRESCRIBER/CLIN PHARMACIST DOCUMENTED: ICD-10-PCS | Mod: CPTII,S$GLB,, | Performed by: INTERNAL MEDICINE

## 2022-01-24 PROCEDURE — 3074F SYST BP LT 130 MM HG: CPT | Mod: CPTII,S$GLB,, | Performed by: INTERNAL MEDICINE

## 2022-01-24 PROCEDURE — 3078F PR MOST RECENT DIASTOLIC BLOOD PRESSURE < 80 MM HG: ICD-10-PCS | Mod: CPTII,S$GLB,, | Performed by: INTERNAL MEDICINE

## 2022-01-24 PROCEDURE — 99999 PR PBB SHADOW E&M-EST. PATIENT-LVL III: CPT | Mod: PBBFAC,,, | Performed by: INTERNAL MEDICINE

## 2022-01-24 PROCEDURE — 1160F RVW MEDS BY RX/DR IN RCRD: CPT | Mod: CPTII,S$GLB,, | Performed by: INTERNAL MEDICINE

## 2022-01-24 PROCEDURE — 1125F PR PAIN SEVERITY QUANTIFIED, PAIN PRESENT: ICD-10-PCS | Mod: CPTII,S$GLB,, | Performed by: INTERNAL MEDICINE

## 2022-01-24 PROCEDURE — 1159F MED LIST DOCD IN RCRD: CPT | Mod: CPTII,S$GLB,, | Performed by: INTERNAL MEDICINE

## 2022-01-24 PROCEDURE — 99215 OFFICE O/P EST HI 40 MIN: CPT | Mod: S$GLB,,, | Performed by: INTERNAL MEDICINE

## 2022-01-24 PROCEDURE — 99215 PR OFFICE/OUTPT VISIT, EST, LEVL V, 40-54 MIN: ICD-10-PCS | Mod: S$GLB,,, | Performed by: INTERNAL MEDICINE

## 2022-01-24 RX ORDER — PROMETHAZINE HYDROCHLORIDE 25 MG/1
25 TABLET ORAL EVERY 6 HOURS PRN
Qty: 60 TABLET | Refills: 3 | Status: SHIPPED | OUTPATIENT
Start: 2022-01-24 | End: 2023-03-27 | Stop reason: SDUPTHER

## 2022-01-24 RX ORDER — SCOLOPAMINE TRANSDERMAL SYSTEM 1 MG/1
1 PATCH, EXTENDED RELEASE TRANSDERMAL
Qty: 4 PATCH | Refills: 1 | Status: SHIPPED | OUTPATIENT
Start: 2022-01-24 | End: 2023-01-24

## 2022-01-24 NOTE — PROGRESS NOTES
PATIENT: Nathan Simpson  MRN: [id number]  DATE: 1/24/2022    Diagnosis:   1. Idiopathic acute pancreatitis, unspecified complication status    2. Adenocarcinoma of left lung, stage 4    3. Chemotherapy-induced nausea and vomiting    4. Chemotherapy-induced thrombocytopenia      Chief Complaint:  Lung cancer    Subjective:     History of Present Illness:     Dx: Stage 4 Left lung adenocarcinoma    She has history of chronic idiopathic pancreatitis, CT abd Feb 2021 showed a lung mass.    02/21/2021 CT abdomen pelvis with contrast - Pulmonary nodule/mass within the left lower lobe. Sclerotic appearing lesion within the anterior aspect of L1 also noted, also new since examination of 2015 concerning for metastatic disease.    02/22/2021 CT chest with contrast - Masslike lesion of the left lower lobe concerning for pulmonary neoplasm. Mildly enlarged left hilar and AP window nodes concerning for metastasis.    02/23/2021 ultrasound abdomen - Pancreatic and biliary ductal dilatation again noted, corresponding to the appearance on CT. There is no sonographic evidence for cholelithiasis, however there is a focal area of gallbladder wall heterogeneous thickening, the possibility of adenomyosis is considered, however given the appearance a focal infiltrating process or neoplasm of the gallbladder wall cannot be excluded.  Close clinical and historical correlation and follow-up is recommended. There is no sonographic evidence for cholelithiasis on this exam.    03/15/2021 CT guided biopsy left lower lobe lung mass showed Lung adenocarcinoma.  It was PDL1 positive with 1% tumor cells being positive.  It was also positive for the EGFR Exon 19 mutation.  It was negative for other mutations ALK, BRAF, KRISHAN, MET, NTRK1, RET, ROS1    5/17/2021 PET scan - Hypermetabolic left lower lobe mass representing patient's biopsy-proven lung cancer.  PET-CT evidence of metastatic disease to numerous hilar and mediastinal lymph nodes, and bone.  "Subcentimeter right upper lobe pulmonary nodule, below the size threshold for PET.    5/17/2021 Brain MRI Subcentimeter question ring-enhancing lesion in the left inferior parietal lobule somewhat distorted by motion cannot exclude parenchymal metastases.    Patient is in a wheelchair.  She cannot see very well.  She lives in [place] with her daughter, Ms. Kitchen    INTERVAL HISTORY:   -here for follow-up of stage IV EGFR Exon 19 positive lung adenocarcinoma  -6/5/21-started osimertinib 80 mg QD  -follows with GI for h/o pancreatitis  -has been having breakdown of skin associated with pain and bleeding in the fingertips for few days    Past Medical, Surgical, Family and Social History Reviewed.    Medications and Allergies reviewed    Review of Systems   Constitutional: Positive for fatigue. Negative for fever and unexpected weight change.   Eyes: Negative for visual disturbance.   Musculoskeletal: Positive for arthralgias.   Psychiatric/Behavioral: Positive for behavioral problems.       Objective:     Vitals:    01/24/22 1303   BP: (!) 97/53   BP Location: Left arm   Patient Position: Sitting   BP Method: Medium (Automatic)   Pulse: 66   Resp: 18   Temp: 97.5 °F (36.4 °C)   TempSrc: Oral   SpO2: 99%   Weight: 57.6 kg (126 lb 15.8 oz)   Height: 6' 1" (1.854 m)     BMI: Body mass index is 16.75 kg/m².    Physical Exam  Vitals reviewed.   Constitutional:       General: She is not in acute distress.  Pulmonary:      Effort: Pulmonary effort is normal.      Breath sounds: Normal breath sounds.   Neurological:      Mental Status: She is alert and oriented to person, place, and time.         Assessment:       1. Idiopathic acute pancreatitis, unspecified complication status    2. Adenocarcinoma of left lung, stage 4    3. Chemotherapy-induced nausea and vomiting    4. Chemotherapy-induced thrombocytopenia      Plan:   EGFR positive Stage 4 Left Lung adenocarcinoma  -EGFR Exon 19 mutation positive  -PDL1 positive 1% tumor " cells positive  -she started osimertinib 80 mg qd 6/5/2021  -now having vasculitis like side effects from osimertinib, it is pretty painful, she is pretty symptomatic, will discontinue osimertinib.  Give a break for a few weeks and once her symptoms resolve, will restart osimertinib at40 mg q.d. new prescription sent to the pharmacy    -labs cbc, chemistries reviewed  -reviewed CT chest/abdomen/pelvis with contrast done 01/10/2022 which reveals positive response of her lung cancer to osimertinib  -we will check CBC and chemistries in 3 months and see her back for follow-up    Keratitis/cutaneous vasculitis  -from osimertinib  -hold osimertinib 80 mg q.d.  -once symptoms resolve, restart osimertinib at 40 mg q.d.    Brain metastasis  -there is a question of metastatic spread of lung cancer to the brain based on Brain MRI done in may 2021  -repeat MRI brain 08/03/2021 shows improvement and decreased edema signal    Idiopathic pancreatitis  -EUS plus/minus ERCP per GI    Thrombocytopenia  -possibly secondary to osimertinib  -noted  -will monitor    Legally blind  -needs help with a sitter    Labs in [facility] before f/u for pt convenience.      Her daughter plans to take her on a 5 day cruise next month

## 2022-01-24 NOTE — PROGRESS NOTES
Patient, Nathan Simpson (MRN #[id number]), presented with a recent Platelet count less than 150 K/uL consistent with the definition of thrombocytopenia (ICD10 - D69.6).    Platelets   Date Value Ref Range Status   01/10/2022 112 (L) 150 - 450 K/uL Final     The patient's thrombocytopenia was monitored, evaluated, addressed and/or treated. This addendum to the medical record is made on 01/24/2022.

## 2022-01-26 ENCOUNTER — SPECIALTY PHARMACY (OUTPATIENT)
Dept: PHARMACY | Facility: CLINIC | Age: 77
End: 2022-01-26
Payer: MEDICARE

## 2022-01-31 ENCOUNTER — OFFICE VISIT (OUTPATIENT)
Dept: GASTROENTEROLOGY | Facility: CLINIC | Age: 77
End: 2022-01-31
Payer: MEDICARE

## 2022-01-31 VITALS — WEIGHT: 122.81 LBS | BODY MASS INDEX: 16.2 KG/M2

## 2022-01-31 DIAGNOSIS — K86.1 CHRONIC PANCREATITIS, UNSPECIFIED PANCREATITIS TYPE: Primary | ICD-10-CM

## 2022-01-31 DIAGNOSIS — K21.9 GASTROESOPHAGEAL REFLUX DISEASE, UNSPECIFIED WHETHER ESOPHAGITIS PRESENT: ICD-10-CM

## 2022-01-31 PROCEDURE — 3288F PR FALLS RISK ASSESSMENT DOCUMENTED: ICD-10-PCS | Mod: CPTII,S$GLB,, | Performed by: INTERNAL MEDICINE

## 2022-01-31 PROCEDURE — 99999 PR PBB SHADOW E&M-EST. PATIENT-LVL III: CPT | Mod: PBBFAC,,, | Performed by: INTERNAL MEDICINE

## 2022-01-31 PROCEDURE — 99999 PR PBB SHADOW E&M-EST. PATIENT-LVL III: ICD-10-PCS | Mod: PBBFAC,,, | Performed by: INTERNAL MEDICINE

## 2022-01-31 PROCEDURE — 1126F AMNT PAIN NOTED NONE PRSNT: CPT | Mod: CPTII,S$GLB,, | Performed by: INTERNAL MEDICINE

## 2022-01-31 PROCEDURE — 1101F PR PT FALLS ASSESS DOC 0-1 FALLS W/OUT INJ PAST YR: ICD-10-PCS | Mod: CPTII,S$GLB,, | Performed by: INTERNAL MEDICINE

## 2022-01-31 PROCEDURE — 1159F PR MEDICATION LIST DOCUMENTED IN MEDICAL RECORD: ICD-10-PCS | Mod: CPTII,S$GLB,, | Performed by: INTERNAL MEDICINE

## 2022-01-31 PROCEDURE — 1101F PT FALLS ASSESS-DOCD LE1/YR: CPT | Mod: CPTII,S$GLB,, | Performed by: INTERNAL MEDICINE

## 2022-01-31 PROCEDURE — 99214 OFFICE O/P EST MOD 30 MIN: CPT | Mod: S$GLB,,, | Performed by: INTERNAL MEDICINE

## 2022-01-31 PROCEDURE — 3288F FALL RISK ASSESSMENT DOCD: CPT | Mod: CPTII,S$GLB,, | Performed by: INTERNAL MEDICINE

## 2022-01-31 PROCEDURE — 1159F MED LIST DOCD IN RCRD: CPT | Mod: CPTII,S$GLB,, | Performed by: INTERNAL MEDICINE

## 2022-01-31 PROCEDURE — 99214 PR OFFICE/OUTPT VISIT, EST, LEVL IV, 30-39 MIN: ICD-10-PCS | Mod: S$GLB,,, | Performed by: INTERNAL MEDICINE

## 2022-01-31 PROCEDURE — 1126F PR PAIN SEVERITY QUANTIFIED, NO PAIN PRESENT: ICD-10-PCS | Mod: CPTII,S$GLB,, | Performed by: INTERNAL MEDICINE

## 2022-01-31 RX ORDER — PANTOPRAZOLE SODIUM 40 MG/1
40 TABLET, DELAYED RELEASE ORAL DAILY
Qty: 30 TABLET | Refills: 3 | Status: SHIPPED | OUTPATIENT
Start: 2022-01-31 | End: 2023-04-02

## 2022-01-31 NOTE — PROGRESS NOTES
Subjective:       Patient ID: Nathan Simpson is a 76 y.o. female.    Chief Complaint: Abdominal Pain, Diarrhea, and Pancreatitis    Patient here today to follow-up with aforementioned complaints.  She was previously seen by Dr. Jefry Carter in August with similar complaints.  She was referred for EUS which was ultimately performed in November.  Exam was significant only for dilation in the pancreatic duct as well as the bile duct.  Today patient reports ongoing abdominal pain which occurs every 2 months or so.  When she feels it coming on she will take Percocet which helps sometimes, however she frequently requires ED presentation.  She eats well and denies early satiety.  She does avoid certain foods, particularly greasy and acidic foods.      Review of Systems   Gastrointestinal: Positive for abdominal distention, abdominal pain and nausea.       The following portions of the patient's history were reviewed and updated as appropriate: allergies, current medications, past family history, past medical history, past social history, past surgical history and problem list.    Objective:      Physical Exam  Constitutional:       Appearance: She is well-developed and well-nourished.   HENT:      Head: Normocephalic and atraumatic.   Eyes:      Extraocular Movements: EOM normal.      Conjunctiva/sclera: Conjunctivae normal.   Pulmonary:      Effort: Pulmonary effort is normal. No respiratory distress.   Musculoskeletal:      Cervical back: Normal range of motion.   Neurological:      Mental Status: She is alert and oriented to person, place, and time.   Psychiatric:         Mood and Affect: Mood and affect normal.         Behavior: Behavior normal.         Thought Content: Thought content normal.         Judgment: Judgment normal.           Pertinent labs and imaging studies reviewed    Assessment:       1. Chronic pancreatitis, unspecified pancreatitis type    2. Gastroesophageal reflux disease, unspecified whether  esophagitis present        Plan:       Patient here today to follow-up with chronic pancreatitis.  She has been noted to have microlithiasis on previous EUS (2015) as well as findings suspicious for a here gallstone on recent ultrasound.  Regardless patient is not interested in surgery.  Will place on trial of PPI to treat suspected GERD    (Portions of this note were dictated using voice recognition software and may contain dictation related errors in spelling/grammar/syntax not found on text review)

## 2022-02-02 NOTE — TELEPHONE ENCOUNTER
Tagrisso dose reduced script refill received. Reviewed 01/10/2022  labs: CMP, stable. CBC: Plts; 112 K, hgb: 11.7, ANc: 2.0. No adjustment needs   QTc: 435, last documented on 12/27. Crcl: 39.8 ml/min, hepatic function stable.       According to chart note, Dose has been reduced to 40 mg once daily due to Vaculitis side effects. Patient is taking a break, but will restart in a few weeks. RADHA opened.     PA not required. $0 copay. I will push this one through at this time. I am not sure when patient will restart at the 40 mg dose, however, I will pend the initial consult to 02/09/2022.

## 2022-02-08 NOTE — TELEPHONE ENCOUNTER
Incoming call from patient's daughter regarding dose decrease and suggestions on OTC pain relievers. Daughter is aware that patient's Tagrisso has been decreased from 80 mg daily to 40 mg daily. Informed her that we received the dose reduced script. Chart notes indicate pt is holding for a few weeks. We are currently waiting for her provider to inform us of her re-start date.     I recommended Tylenol for arm pain relief.     Provider's office messaged to obtain a re-start date.     Daughter expressed understanding.

## 2022-02-15 NOTE — TELEPHONE ENCOUNTER
Patient's daughter called to inform [facility] Dilia that the pain medication her mother is taking is Norco. Drug safety I-vent completed. Will let [facility] know.

## 2022-02-15 NOTE — TELEPHONE ENCOUNTER
I was informed by patient's provider that she may restart dose reduced Tagrisso on 3/1/2022.     Call will be pended to 2/21/2022 since patient and daughter is going on a cruise. The cruise is set to leave on 02/26.     Patient's daughter notified. She states Ms. Simpson has started a new medication for arm pain, but can not recall the name. She agreed to inform us of the name of the newly prescribed pain medication once she is leaves work.

## 2022-02-21 ENCOUNTER — SPECIALTY PHARMACY (OUTPATIENT)
Dept: PHARMACY | Facility: CLINIC | Age: 77
End: 2022-02-21
Payer: MEDICARE

## 2022-02-21 RX ORDER — HYDROCODONE BITARTRATE AND ACETAMINOPHEN 10; 325 MG/1; MG/1
1 TABLET ORAL
COMMUNITY
End: 2023-03-27 | Stop reason: SDUPTHER
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
## 2022-02-24 ENCOUNTER — CLINICAL SUPPORT (OUTPATIENT)
Dept: URGENT CARE | Facility: CLINIC | Age: 77
End: 2022-02-24
Payer: MEDICARE

## 2022-02-24 ENCOUNTER — OFFICE VISIT (OUTPATIENT)
Dept: GASTROENTEROLOGY | Facility: CLINIC | Age: 77
End: 2022-02-24
Payer: MEDICARE

## 2022-02-24 VITALS — DIASTOLIC BLOOD PRESSURE: 63 MMHG | HEART RATE: 76 BPM | SYSTOLIC BLOOD PRESSURE: 88 MMHG

## 2022-02-24 DIAGNOSIS — K85.90 ACUTE PANCREATITIS, UNSPECIFIED COMPLICATION STATUS, UNSPECIFIED PANCREATITIS TYPE: Primary | ICD-10-CM

## 2022-02-24 DIAGNOSIS — Z71.84 TRAVEL ADVICE ENCOUNTER: Primary | ICD-10-CM

## 2022-02-24 LAB
CTP QC/QA: YES
SARS-COV-2 RDRP RESP QL NAA+PROBE: NEGATIVE

## 2022-02-24 PROCEDURE — 99211 PR OFFICE/OUTPT VISIT, EST, LEVL I: ICD-10-PCS | Mod: S$GLB,,, | Performed by: FAMILY MEDICINE

## 2022-02-24 PROCEDURE — 99214 PR OFFICE/OUTPT VISIT, EST, LEVL IV, 30-39 MIN: ICD-10-PCS | Mod: S$GLB,,, | Performed by: INTERNAL MEDICINE

## 2022-02-24 PROCEDURE — 3074F SYST BP LT 130 MM HG: CPT | Mod: CPTII,S$GLB,, | Performed by: INTERNAL MEDICINE

## 2022-02-24 PROCEDURE — U0002: ICD-10-PCS | Mod: QW,S$GLB,, | Performed by: FAMILY MEDICINE

## 2022-02-24 PROCEDURE — 1126F AMNT PAIN NOTED NONE PRSNT: CPT | Mod: CPTII,S$GLB,, | Performed by: INTERNAL MEDICINE

## 2022-02-24 PROCEDURE — 3288F PR FALLS RISK ASSESSMENT DOCUMENTED: ICD-10-PCS | Mod: CPTII,S$GLB,, | Performed by: INTERNAL MEDICINE

## 2022-02-24 PROCEDURE — 1159F PR MEDICATION LIST DOCUMENTED IN MEDICAL RECORD: ICD-10-PCS | Mod: CPTII,S$GLB,, | Performed by: INTERNAL MEDICINE

## 2022-02-24 PROCEDURE — U0002 COVID-19 LAB TEST NON-CDC: HCPCS | Mod: QW,S$GLB,, | Performed by: FAMILY MEDICINE

## 2022-02-24 PROCEDURE — 3078F DIAST BP <80 MM HG: CPT | Mod: CPTII,S$GLB,, | Performed by: INTERNAL MEDICINE

## 2022-02-24 PROCEDURE — 3288F FALL RISK ASSESSMENT DOCD: CPT | Mod: CPTII,S$GLB,, | Performed by: INTERNAL MEDICINE

## 2022-02-24 PROCEDURE — 99999 PR PBB SHADOW E&M-EST. PATIENT-LVL III: CPT | Mod: PBBFAC,,, | Performed by: INTERNAL MEDICINE

## 2022-02-24 PROCEDURE — 1159F MED LIST DOCD IN RCRD: CPT | Mod: CPTII,S$GLB,, | Performed by: INTERNAL MEDICINE

## 2022-02-24 PROCEDURE — 99999 PR PBB SHADOW E&M-EST. PATIENT-LVL III: ICD-10-PCS | Mod: PBBFAC,,, | Performed by: INTERNAL MEDICINE

## 2022-02-24 PROCEDURE — 99211 OFF/OP EST MAY X REQ PHY/QHP: CPT | Mod: S$GLB,,, | Performed by: FAMILY MEDICINE

## 2022-02-24 PROCEDURE — 3078F PR MOST RECENT DIASTOLIC BLOOD PRESSURE < 80 MM HG: ICD-10-PCS | Mod: CPTII,S$GLB,, | Performed by: INTERNAL MEDICINE

## 2022-02-24 PROCEDURE — 1101F PR PT FALLS ASSESS DOC 0-1 FALLS W/OUT INJ PAST YR: ICD-10-PCS | Mod: CPTII,S$GLB,, | Performed by: INTERNAL MEDICINE

## 2022-02-24 PROCEDURE — 1101F PT FALLS ASSESS-DOCD LE1/YR: CPT | Mod: CPTII,S$GLB,, | Performed by: INTERNAL MEDICINE

## 2022-02-24 PROCEDURE — 99214 OFFICE O/P EST MOD 30 MIN: CPT | Mod: S$GLB,,, | Performed by: INTERNAL MEDICINE

## 2022-02-24 PROCEDURE — 1126F PR PAIN SEVERITY QUANTIFIED, NO PAIN PRESENT: ICD-10-PCS | Mod: CPTII,S$GLB,, | Performed by: INTERNAL MEDICINE

## 2022-02-24 PROCEDURE — 3074F PR MOST RECENT SYSTOLIC BLOOD PRESSURE < 130 MM HG: ICD-10-PCS | Mod: CPTII,S$GLB,, | Performed by: INTERNAL MEDICINE

## 2022-02-24 RX ORDER — OXYCODONE AND ACETAMINOPHEN 2.5; 325 MG/1; MG/1
1 TABLET ORAL EVERY 6 HOURS PRN
Qty: 10 TABLET | Refills: 0 | Status: SHIPPED | OUTPATIENT
Start: 2022-02-24 | End: 2023-04-02

## 2022-02-24 NOTE — PROGRESS NOTES
Subjective:      Patient ID: Nathan Simpson is a 76 y.o. female.    Chief Complaint: No chief complaint on file.      HPI:  Nathan Simpson is a 76 y.o. female with recurrent pancreatitis, recently diagnosed with stage 4 lung ca coming to discuss recent pancreatitis attack about 2 months ago. Patient is doing well now. No major complaints otherwise.     Review of patient's allergies indicates:   Allergen Reactions    Heparin analogues Other (See Comments)     HIT panel pending        Current Outpatient Medications   Medication Sig Dispense Refill    diclofenac (FLECTOR) 1.3 % PT12 Apply 1 patch topically daily as needed.      HYDROcodone-acetaminophen (NORCO)  mg per tablet Take 1 tablet by mouth as needed. Take 1/2 tablet by mouth in the morning and 1/2 tablet by mouth in the evening as needed      osimertinib (TAGRISSO) 40 mg Tab Take 40 mg by mouth once daily. 30 tablet 11    oxyCODONE-acetaminophen (PERCOCET) 7.5-325 mg per tablet Take 1 tablet by mouth every 6 (six) hours as needed for Pain. 12 tablet 0    pantoprazole (PROTONIX) 40 MG tablet Take 1 tablet (40 mg total) by mouth once daily. 30 tablet 3    promethazine (PHENERGAN) 25 MG tablet Take 1 tablet (25 mg total) by mouth every 6 (six) hours as needed for Nausea. 60 tablet 3    scopolamine (TRANSDERM-SCOP) 1.3-1.5 mg (1 mg over 3 days) Place 1 patch onto the skin every 72 hours. 4 patch 1    oxyCODONE-acetaminophen (PERCOCET) 2.5-325 mg per tablet Take 1 tablet by mouth every 6 (six) hours as needed for Pain. 10 tablet 0     No current facility-administered medications for this visit.          Objective:     Physical Exam  Abdominal:      General: Abdomen is flat. There is no distension.      Palpations: Abdomen is soft.      Tenderness: There is no abdominal tenderness.         Assessment/Plan:     1. Idiopathic recurrent acute pancreatitis   EUS unrevealing.  Patient with GB in situ making cholecystectomy the reasonable next step. However,  patient with stage 4 lung ca making surgery risky. Will discuss with Dr. Copeland.  ERCP with biliary/panc intervention is unlikely to prevent future recurrent attacks and would only be considered if conservative management fails.  Encouraged hydration and low fat diet.         I spent a total of 30 minutes on the day of the visit.  This includes face to face time and non-face to face time preparing to see the patient (eg, review of tests), obtaining and/or reviewing separately obtained history, documenting clinical information in the electronic or other health record, independently interpreting results and communicating results to the patient/family/caregiver, or care coordinator.

## 2022-02-24 NOTE — PATIENT INSTRUCTIONS
Your test was NEGATIVE for COVID-19 (coronavirus).      You may leave home and/or return to work when the following conditions are met:  24 hours fever free without fever-reducing medications AND  Improved symptoms  You are fully vaccinated or have not had close contact with someone with COVID-19 (within 6 feet for 15 minutes or more)    If you are fully vaccinated and had a close contact, there is no need for quarantine, unless you develop symptoms.      If you are not fully vaccinated and had a close contact:  Retest at 5 to 7 days post-exposure  If possible, it is recommended that you quarantine for 5 days from the time of contact regardless of your test status.  A mask should be worn indoors post quarantine.    If your symptoms worsen or if you have any other concerns, please contact [facility] at [phone].     Sincerely,    Cecily Lewis PA-C

## 2022-02-24 NOTE — PROGRESS NOTES

## 2022-03-24 ENCOUNTER — SPECIALTY PHARMACY (OUTPATIENT)
Dept: PHARMACY | Facility: CLINIC | Age: 77
End: 2022-03-24
Payer: MEDICARE

## 2022-03-24 NOTE — TELEPHONE ENCOUNTER
Specialty Pharmacy - Refill Coordination    Specialty Medication Orders Linked to Encounter    Flowsheet Row Most Recent Value   Medication #1 osimertinib (TAGRISSO) 40 mg Tab (Order#[id number], Rx#[id number])          Refill Questions - Documented Responses    Flowsheet Row Most Recent Value   Patient Availability and HIPAA Verification    Does patient want to proceed with activity? Yes   HIPAA/medical authority confirmed? Yes   Relationship to patient of person spoken to? Child   Refill Screening Questions    Changes to medications? No   New conditions since last clinic visit? No   Unplanned office visit, urgent care, ED, or hospital admission in the last 4 weeks? No   How does patient/caregiver feel medication is working? Good   Financial problems or insurance changes? No   How many doses of your specialty medications were missed in the last 4 weeks? 0   Would patient like to speak to a pharmacist? No   When does the patient need to receive the medication? 04/01/22   Refill Delivery Questions    How will the patient receive the medication? Delivery Emily   When does the patient need to receive the medication? 04/01/22   Shipping Address Prescription   Address in [facility] confirmed and updated if neccessary? Yes   Expected Copay ($) 0   Is the patient able to afford the medication copay? Yes   Payment Method zero copay   Days supply of Refill 30   Supplies needed? No supplies needed   Refill activity completed? Yes   Refill activity plan Refill scheduled   Shipment/Pickup Date: 03/30/22          Current Outpatient Medications   Medication Sig    diclofenac (FLECTOR) 1.3 % PT12 Apply 1 patch topically daily as needed.    HYDROcodone-acetaminophen (NORCO)  mg per tablet Take 1 tablet by mouth as needed. Take 1/2 tablet by mouth in the morning and 1/2 tablet by mouth in the evening as needed    osimertinib (TAGRISSO) 40 mg Tab Take 40 mg by mouth once daily.    oxyCODONE-acetaminophen (PERCOCET)  2.5-325 mg per tablet Take 1 tablet by mouth every 6 (six) hours as needed for Pain.    oxyCODONE-acetaminophen (PERCOCET) 7.5-325 mg per tablet Take 1 tablet by mouth every 6 (six) hours as needed for Pain.    pantoprazole (PROTONIX) 40 MG tablet Take 1 tablet (40 mg total) by mouth once daily.    promethazine (PHENERGAN) 25 MG tablet Take 1 tablet (25 mg total) by mouth every 6 (six) hours as needed for Nausea.    scopolamine (TRANSDERM-SCOP) 1.3-1.5 mg (1 mg over 3 days) Place 1 patch onto the skin every 72 hours.   Last reviewed on 2/24/2022 11:48 AM by Rick Castellano LPN    Review of patient's allergies indicates:   Allergen Reactions    Heparin analogues Other (See Comments)     HIT panel pending     Last reviewed on  2/24/2022 11:48 AM by Rick Castellano      Tasks added this encounter   4/24/2022 - Refill Call (Auto Added)   Tasks due within next 3 months   No tasks due.     Farrukh Pandey, PharmD  [facility]  [address] [address]  Phone: [phone]  Fax: [phone]

## 2022-03-29 RX ORDER — MUPIROCIN 20 MG/G
OINTMENT TOPICAL 3 TIMES DAILY
COMMUNITY
End: 2022-06-20

## 2022-04-01 ENCOUNTER — LAB VISIT (OUTPATIENT)
Dept: LAB | Facility: HOSPITAL | Age: 77
End: 2022-04-01
Attending: INTERNAL MEDICINE
Payer: MEDICARE

## 2022-04-01 DIAGNOSIS — C34.92 ADENOCARCINOMA OF LEFT LUNG, STAGE 4: ICD-10-CM

## 2022-04-01 LAB
ALBUMIN SERPL BCP-MCNC: 4.4 G/DL (ref 3.5–5.2)
ALP SERPL-CCNC: 80 U/L (ref 38–126)
ALT SERPL W/O P-5'-P-CCNC: 6 U/L (ref 10–44)
ANION GAP SERPL CALC-SCNC: 10 MMOL/L (ref 8–16)
AST SERPL-CCNC: 25 U/L (ref 15–46)
BASOPHILS # BLD AUTO: 0.02 K/UL (ref 0–0.2)
BASOPHILS NFR BLD: 0.4 % (ref 0–1.9)
BILIRUB SERPL-MCNC: 0.4 MG/DL (ref 0.1–1)
CALCIUM SERPL-MCNC: 10 MG/DL (ref 8.7–10.5)
CHLORIDE SERPL-SCNC: 106 MMOL/L (ref 95–110)
CO2 SERPL-SCNC: 28 MMOL/L (ref 23–29)
CREAT SERPL-MCNC: 0.96 MG/DL (ref 0.5–1.4)
DIFFERENTIAL METHOD: ABNORMAL
EOSINOPHIL # BLD AUTO: 0.2 K/UL (ref 0–0.5)
EOSINOPHIL NFR BLD: 3.8 % (ref 0–8)
ERYTHROCYTE [DISTWIDTH] IN BLOOD BY AUTOMATED COUNT: 15.6 % (ref 11.5–14.5)
EST. GFR  (AFRICAN AMERICAN): >60 ML/MIN/1.73 M^2
EST. GFR  (NON AFRICAN AMERICAN): 57.2 ML/MIN/1.73 M^2
GLUCOSE SERPL-MCNC: 77 MG/DL (ref 70–110)
HCT VFR BLD AUTO: 42.2 % (ref 37–48.5)
HGB BLD-MCNC: 13.1 G/DL (ref 12–16)
IMM GRANULOCYTES # BLD AUTO: 0.01 K/UL (ref 0–0.04)
IMM GRANULOCYTES NFR BLD AUTO: 0.2 % (ref 0–0.5)
LYMPHOCYTES # BLD AUTO: 2 K/UL (ref 1–4.8)
LYMPHOCYTES NFR BLD: 42.6 % (ref 18–48)
MCH RBC QN AUTO: 25.6 PG (ref 27–31)
MCHC RBC AUTO-ENTMCNC: 31 G/DL (ref 32–36)
MCV RBC AUTO: 83 FL (ref 82–98)
MONOCYTES # BLD AUTO: 0.4 K/UL (ref 0.3–1)
MONOCYTES NFR BLD: 7.3 % (ref 4–15)
NEUTROPHILS # BLD AUTO: 2.2 K/UL (ref 1.8–7.7)
NEUTROPHILS NFR BLD: 45.7 % (ref 38–73)
NRBC BLD-RTO: 0 /100 WBC
PLATELET # BLD AUTO: 119 K/UL (ref 150–450)
PMV BLD AUTO: 10.5 FL (ref 9.2–12.9)
POTASSIUM SERPL-SCNC: 5 MMOL/L (ref 3.5–5.1)
PROT SERPL-MCNC: 8 G/DL (ref 6–8.4)
RBC # BLD AUTO: 5.11 M/UL (ref 4–5.4)
SODIUM SERPL-SCNC: 144 MMOL/L (ref 136–145)
UUN UR-MCNC: 24 MG/DL (ref 7–17)
WBC # BLD AUTO: 4.79 K/UL (ref 3.9–12.7)

## 2022-04-01 PROCEDURE — 86038 ANTINUCLEAR ANTIBODIES: CPT | Mod: PO | Performed by: INTERNAL MEDICINE

## 2022-04-01 PROCEDURE — 80053 COMPREHEN METABOLIC PANEL: CPT | Mod: PO | Performed by: INTERNAL MEDICINE

## 2022-04-01 PROCEDURE — 36415 COLL VENOUS BLD VENIPUNCTURE: CPT | Mod: PO | Performed by: INTERNAL MEDICINE

## 2022-04-01 PROCEDURE — 85025 COMPLETE CBC W/AUTO DIFF WBC: CPT | Mod: PO | Performed by: INTERNAL MEDICINE

## 2022-04-04 ENCOUNTER — OFFICE VISIT (OUTPATIENT)
Dept: HEMATOLOGY/ONCOLOGY | Facility: CLINIC | Age: 77
End: 2022-04-04
Payer: MEDICARE

## 2022-04-04 VITALS
RESPIRATION RATE: 18 BRPM | TEMPERATURE: 98 F | HEIGHT: 72 IN | OXYGEN SATURATION: 99 % | WEIGHT: 129.19 LBS | SYSTOLIC BLOOD PRESSURE: 95 MMHG | DIASTOLIC BLOOD PRESSURE: 58 MMHG | BODY MASS INDEX: 17.5 KG/M2 | HEART RATE: 66 BPM

## 2022-04-04 DIAGNOSIS — D69.59 CHEMOTHERAPY-INDUCED THROMBOCYTOPENIA: ICD-10-CM

## 2022-04-04 DIAGNOSIS — C34.92 ADENOCARCINOMA OF LEFT LUNG, STAGE 4: Primary | ICD-10-CM

## 2022-04-04 DIAGNOSIS — T45.1X5A CHEMOTHERAPY-INDUCED THROMBOCYTOPENIA: ICD-10-CM

## 2022-04-04 DIAGNOSIS — H54.8 LEGALLY BLIND: ICD-10-CM

## 2022-04-04 DIAGNOSIS — F41.1 GENERALIZED ANXIETY DISORDER: ICD-10-CM

## 2022-04-04 DIAGNOSIS — K86.1 IDIOPATHIC CHRONIC PANCREATITIS: ICD-10-CM

## 2022-04-04 LAB — ANA SER QL IF: NORMAL

## 2022-04-04 PROCEDURE — 3288F FALL RISK ASSESSMENT DOCD: CPT | Mod: CPTII,S$GLB,, | Performed by: INTERNAL MEDICINE

## 2022-04-04 PROCEDURE — 99497 ADVNCD CARE PLAN 30 MIN: CPT | Mod: S$GLB,,, | Performed by: INTERNAL MEDICINE

## 2022-04-04 PROCEDURE — 3078F DIAST BP <80 MM HG: CPT | Mod: CPTII,S$GLB,, | Performed by: INTERNAL MEDICINE

## 2022-04-04 PROCEDURE — 99214 PR OFFICE/OUTPT VISIT, EST, LEVL IV, 30-39 MIN: ICD-10-PCS | Mod: S$GLB,,, | Performed by: INTERNAL MEDICINE

## 2022-04-04 PROCEDURE — 3288F PR FALLS RISK ASSESSMENT DOCUMENTED: ICD-10-PCS | Mod: CPTII,S$GLB,, | Performed by: INTERNAL MEDICINE

## 2022-04-04 PROCEDURE — 1160F RVW MEDS BY RX/DR IN RCRD: CPT | Mod: CPTII,S$GLB,, | Performed by: INTERNAL MEDICINE

## 2022-04-04 PROCEDURE — 99214 OFFICE O/P EST MOD 30 MIN: CPT | Mod: S$GLB,,, | Performed by: INTERNAL MEDICINE

## 2022-04-04 PROCEDURE — 3074F PR MOST RECENT SYSTOLIC BLOOD PRESSURE < 130 MM HG: ICD-10-PCS | Mod: CPTII,S$GLB,, | Performed by: INTERNAL MEDICINE

## 2022-04-04 PROCEDURE — 3078F PR MOST RECENT DIASTOLIC BLOOD PRESSURE < 80 MM HG: ICD-10-PCS | Mod: CPTII,S$GLB,, | Performed by: INTERNAL MEDICINE

## 2022-04-04 PROCEDURE — 99999 PR PBB SHADOW E&M-EST. PATIENT-LVL III: ICD-10-PCS | Mod: PBBFAC,,, | Performed by: INTERNAL MEDICINE

## 2022-04-04 PROCEDURE — 99497 PR ADVNCD CARE PLAN 30 MIN: ICD-10-PCS | Mod: S$GLB,,, | Performed by: INTERNAL MEDICINE

## 2022-04-04 PROCEDURE — 1159F PR MEDICATION LIST DOCUMENTED IN MEDICAL RECORD: ICD-10-PCS | Mod: CPTII,S$GLB,, | Performed by: INTERNAL MEDICINE

## 2022-04-04 PROCEDURE — 1101F PT FALLS ASSESS-DOCD LE1/YR: CPT | Mod: CPTII,S$GLB,, | Performed by: INTERNAL MEDICINE

## 2022-04-04 PROCEDURE — 99999 PR PBB SHADOW E&M-EST. PATIENT-LVL III: CPT | Mod: PBBFAC,,, | Performed by: INTERNAL MEDICINE

## 2022-04-04 PROCEDURE — 1160F PR REVIEW ALL MEDS BY PRESCRIBER/CLIN PHARMACIST DOCUMENTED: ICD-10-PCS | Mod: CPTII,S$GLB,, | Performed by: INTERNAL MEDICINE

## 2022-04-04 PROCEDURE — 1125F PR PAIN SEVERITY QUANTIFIED, PAIN PRESENT: ICD-10-PCS | Mod: CPTII,S$GLB,, | Performed by: INTERNAL MEDICINE

## 2022-04-04 PROCEDURE — 1125F AMNT PAIN NOTED PAIN PRSNT: CPT | Mod: CPTII,S$GLB,, | Performed by: INTERNAL MEDICINE

## 2022-04-04 PROCEDURE — 3074F SYST BP LT 130 MM HG: CPT | Mod: CPTII,S$GLB,, | Performed by: INTERNAL MEDICINE

## 2022-04-04 PROCEDURE — 1159F MED LIST DOCD IN RCRD: CPT | Mod: CPTII,S$GLB,, | Performed by: INTERNAL MEDICINE

## 2022-04-04 PROCEDURE — 1101F PR PT FALLS ASSESS DOC 0-1 FALLS W/OUT INJ PAST YR: ICD-10-PCS | Mod: CPTII,S$GLB,, | Performed by: INTERNAL MEDICINE

## 2022-04-04 NOTE — PROGRESS NOTES
PATIENT: Nathan Simpson  MRN: [id number]  DATE: 4/4/2022    Diagnosis:   1. Adenocarcinoma of left lung, stage 4 EGFR positive    2. Idiopathic chronic pancreatitis    3. Legally blind    4. Chemotherapy-induced thrombocytopenia      Chief Complaint:  Lung cancer    Subjective:     History of Present Illness:     Dx: Stage 4 Left lung adenocarcinoma    She has history of chronic idiopathic pancreatitis, CT abd Feb 2021 showed a lung mass.    02/21/2021 CT abdomen pelvis with contrast - Pulmonary nodule/mass within the left lower lobe. Sclerotic appearing lesion within the anterior aspect of L1 also noted, also new since examination of 2015 concerning for metastatic disease.    02/22/2021 CT chest with contrast - Masslike lesion of the left lower lobe concerning for pulmonary neoplasm. Mildly enlarged left hilar and AP window nodes concerning for metastasis.    02/23/2021 ultrasound abdomen - Pancreatic and biliary ductal dilatation again noted, corresponding to the appearance on CT. There is no sonographic evidence for cholelithiasis, however there is a focal area of gallbladder wall heterogeneous thickening, the possibility of adenomyosis is considered, however given the appearance a focal infiltrating process or neoplasm of the gallbladder wall cannot be excluded.  Close clinical and historical correlation and follow-up is recommended. There is no sonographic evidence for cholelithiasis on this exam.    03/15/2021 CT guided biopsy left lower lobe lung mass showed Lung adenocarcinoma.  It was PDL1 positive with 1% tumor cells being positive.  It was also positive for the EGFR Exon 19 mutation.  It was negative for other mutations ALK, BRAF, KRISHAN, MET, NTRK1, RET, ROS1    5/17/2021 PET scan - Hypermetabolic left lower lobe mass representing patient's biopsy-proven lung cancer.  PET-CT evidence of metastatic disease to numerous hilar and mediastinal lymph nodes, and bone. Subcentimeter right upper lobe pulmonary nodule,  "below the size threshold for PET.    5/17/2021 Brain MRI Subcentimeter question ring-enhancing lesion in the left inferior parietal lobule somewhat distorted by motion cannot exclude parenchymal metastases.    Patient is in a wheelchair.  She cannot see very well.  She lives in [place] with her daughter, Ms. Kitchen    INTERVAL HISTORY:   -here for follow-up of stage IV EGFR Exon 19 positive lung adenocarcinoma  -6/5/21-started osimertinib 80 mg QD, due to side effects the dose is decreased to 40 mg and she is doing much better  -follows with GI for h/o pancreatitis    Past Medical, Surgical, Family and Social History Reviewed.    Medications and Allergies reviewed    Review of Systems   Constitutional: Positive for fatigue. Negative for fever and unexpected weight change.   Eyes: Negative for visual disturbance.   Musculoskeletal: Positive for arthralgias.   Psychiatric/Behavioral: Positive for behavioral problems.       Objective:     Vitals:    04/04/22 1042   BP: (!) 95/58   BP Location: Left arm   Patient Position: Sitting   BP Method: Medium (Automatic)   Pulse: 66   Resp: 18   Temp: 98 °F (36.7 °C)   TempSrc: Oral   SpO2: 99%   Weight: 58.6 kg (129 lb 3 oz)   Height: 6' 2" (1.88 m)     BMI: Body mass index is 16.59 kg/m².    Physical Exam  Vitals reviewed.   Constitutional:       General: She is not in acute distress.  Pulmonary:      Effort: Pulmonary effort is normal.      Breath sounds: Normal breath sounds.   Neurological:      Mental Status: She is alert and oriented to person, place, and time.         Assessment:       1. Adenocarcinoma of left lung, stage 4 EGFR positive    2. Idiopathic chronic pancreatitis    3. Legally blind    4. Chemotherapy-induced thrombocytopenia      Plan:   EGFR positive Stage 4 Left Lung adenocarcinoma  -EGFR Exon 19 mutation positive  -PDL1 positive 1% tumor cells positive  -she started osimertinib 80 mg qd 6/5/2021  -due to vasculitis like side effects, dose was decreased to " 40 mg q.d. and she is tolerating this much better    -labs CBC, CMP reviewed  -she is due for her restaging scans, will schedule now  -will check CBC and CMP and see her back for follow-up in 3 months    Keratitis/cutaneous vasculitis  -much improved after decreasing osimertinib to 40 mg QD    ?Brain metastasis  -there is a question of metastatic spread of lung cancer to the brain based on Brain MRI done in may 2021  -repeat MRI brain 08/03/2021 shows improvement and decreased edema signal    Idiopathic pancreatitis  -EUS plus/minus ERCP per GI    Thrombocytopenia  -secondary to osimertinib  -noted  -will monitor    Legally blind  -needs help with a sitter    Labs in [place] before f/u for pt convenience.      Advance Care Planning 04/04/2022   I discussed the importance of advance care planning, reviewed the role for advance directives and their purpose in directing future healthcare if the pt is unable to speak for him/herself, in the event he/she should fall sick and lose decision-making capacity. After our discussion, the pt nominated Fidelina Simpson as the First Choice HCPOA. The form was witnessed and will be scanned into EP[employer]. Spent atleast 16 min for this process.

## 2022-04-11 ENCOUNTER — HOSPITAL ENCOUNTER (OUTPATIENT)
Dept: RADIOLOGY | Facility: HOSPITAL | Age: 77
Discharge: HOME | End: 2022-04-11
Attending: INTERNAL MEDICINE
Payer: MEDICARE

## 2022-04-11 DIAGNOSIS — C34.92 ADENOCARCINOMA OF LEFT LUNG, STAGE 4: ICD-10-CM

## 2022-04-11 PROCEDURE — 25500020 PHARM REV CODE 255: Mod: PO | Performed by: INTERNAL MEDICINE

## 2022-04-11 PROCEDURE — 71260 CT THORAX DX C+: CPT | Mod: TC,PO

## 2022-04-11 PROCEDURE — 74177 CT ABD & PELVIS W/CONTRAST: CPT | Mod: TC,PO

## 2022-04-11 RX ADMIN — IOHEXOL 15 ML: 350 INJECTION, SOLUTION INTRAVENOUS at 12:04

## 2022-04-18 ENCOUNTER — PATIENT MESSAGE (OUTPATIENT)
Dept: ADMINISTRATIVE | Facility: OTHER | Age: 77
End: 2022-04-18
Payer: MEDICARE

## 2022-04-21 ENCOUNTER — SPECIALTY PHARMACY (OUTPATIENT)
Dept: PHARMACY | Facility: CLINIC | Age: 77
End: 2022-04-21
Payer: MEDICARE

## 2022-04-21 NOTE — TELEPHONE ENCOUNTER
Specialty Pharmacy - Refill Coordination    Specialty Medication Orders Linked to Encounter    Flowsheet Row Most Recent Value   Medication #1 osimertinib (TAGRISSO) 40 mg Tab (Order#[id number], Rx#[id number])          Refill Questions - Documented Responses    Flowsheet Row Most Recent Value   Patient Availability and HIPAA Verification    Does patient want to proceed with activity? Yes   HIPAA/medical authority confirmed? Yes   Relationship to patient of person spoken to? Child   Refill Screening Questions    Changes to allergies? No   Changes to medications? No   New conditions since last clinic visit? No   Unplanned office visit, urgent care, ED, or hospital admission in the last 4 weeks? No   How does patient/caregiver feel medication is working? Good   Financial problems or insurance changes? No   How many doses of your specialty medications were missed in the last 4 weeks? 0   Would patient like to speak to a pharmacist? No   When does the patient need to receive the medication? 04/29/22   Refill Delivery Questions    How will the patient receive the medication? Pickup   When does the patient need to receive the medication? 04/29/22   Expected Copay ($) 0   Is the patient able to afford the medication copay? Yes   Payment Method zero copay   Days supply of Refill 30   Supplies needed? No supplies needed   Refill activity completed? No   Refill activity plan Refill scheduled   Shipment/Pickup Date: 04/25/22          Current Outpatient Medications   Medication Sig    diclofenac (FLECTOR) 1.3 % PT12 Apply 1 patch topically daily as needed.    HYDROcodone-acetaminophen (NORCO)  mg per tablet Take 1 tablet by mouth as needed. Take 1/2 tablet by mouth in the morning and 1/2 tablet by mouth in the evening as needed    mupirocin (BACTROBAN) 2 % ointment Apply topically 3 (three) times daily.    osimertinib (TAGRISSO) 40 mg Tab Take 1 tablet (40 mg) by mouth once daily.    oxyCODONE-acetaminophen (PERCOCET)  2.5-325 mg per tablet Take 1 tablet by mouth every 6 (six) hours as needed for Pain.    oxyCODONE-acetaminophen (PERCOCET) 7.5-325 mg per tablet Take 1 tablet by mouth every 6 (six) hours as needed for Pain.    pantoprazole (PROTONIX) 40 MG tablet Take 1 tablet (40 mg total) by mouth once daily.    promethazine (PHENERGAN) 25 MG tablet Take 1 tablet (25 mg total) by mouth every 6 (six) hours as needed for Nausea.    scopolamine (TRANSDERM-SCOP) 1.3-1.5 mg (1 mg over 3 days) Place 1 patch onto the skin every 72 hours.   Last reviewed on 4/4/2022 10:51 AM by Bruno Copeland MD    Review of patient's allergies indicates:   Allergen Reactions    Heparin analogues Other (See Comments)     HIT panel pending     Last reviewed on  4/4/2022 10:51 AM by Bruno Copeland      Tasks added this encounter   No tasks added.   Tasks due within next 3 months   5/22/2022 - Refill Call (Auto Added)  4/26/2022 - Pickup Reminder     Dodie Ramirez, PharmD  [facility]  [address]  [facility] [address]  Phone: [phone]  Fax: [phone]

## 2022-05-23 ENCOUNTER — SPECIALTY PHARMACY (OUTPATIENT)
Dept: PHARMACY | Facility: CLINIC | Age: 77
End: 2022-05-23
Payer: MEDICARE

## 2022-05-23 NOTE — TELEPHONE ENCOUNTER
Specialty Pharmacy - Refill Coordination    Specialty Medication Orders Linked to Encounter    Flowsheet Row Most Recent Value   Medication #1 osimertinib (TAGRISSO) 40 mg Tab (Order#[id number], Rx#[id number])          Refill Questions - Documented Responses    Flowsheet Row Most Recent Value   Patient Availability and HIPAA Verification    Does patient want to proceed with activity? Yes   HIPAA/medical authority confirmed? Yes   Relationship to patient of person spoken to? Child   Refill Screening Questions    Changes to allergies? No   Changes to medications? No   New conditions since last clinic visit? No   Unplanned office visit, urgent care, ED, or hospital admission in the last 4 weeks? No   How does patient/caregiver feel medication is working? Good   Financial problems or insurance changes? No   How many doses of your specialty medications were missed in the last 4 weeks? 0   Would patient like to speak to a pharmacist? No   When does the patient need to receive the medication? 05/26/22   Refill Delivery Questions    How will the patient receive the medication? Delivery Emily   When does the patient need to receive the medication? 05/26/22   Shipping Address Temporary   Address in [facility] confirmed and updated if neccessary? Yes   Expected Copay ($) 0   Is the patient able to afford the medication copay? Yes   Payment Method zero copay   Days supply of Refill 30   Supplies needed? No supplies needed   Refill activity completed? Yes   Refill activity plan Refill scheduled   Shipment/Pickup Date: 05/26/22          Current Outpatient Medications   Medication Sig    diclofenac (FLECTOR) 1.3 % PT12 Apply 1 patch topically daily as needed.    HYDROcodone-acetaminophen (NORCO)  mg per tablet Take 1 tablet by mouth as needed. Take 1/2 tablet by mouth in the morning and 1/2 tablet by mouth in the evening as needed    mupirocin (BACTROBAN) 2 % ointment Apply topically 3 (three) times daily.     osimertinib (TAGRISSO) 40 mg Tab Take 1 tablet (40 mg) by mouth once daily.    oxyCODONE-acetaminophen (PERCOCET) 2.5-325 mg per tablet Take 1 tablet by mouth every 6 (six) hours as needed for Pain.    oxyCODONE-acetaminophen (PERCOCET) 7.5-325 mg per tablet Take 1 tablet by mouth every 6 (six) hours as needed for Pain.    pantoprazole (PROTONIX) 40 MG tablet Take 1 tablet (40 mg total) by mouth once daily.    promethazine (PHENERGAN) 25 MG tablet Take 1 tablet (25 mg total) by mouth every 6 (six) hours as needed for Nausea.    scopolamine (TRANSDERM-SCOP) 1.3-1.5 mg (1 mg over 3 days) Place 1 patch onto the skin every 72 hours.   Last reviewed on 4/4/2022 10:51 AM by Bruno Copeland MD    Review of patient's allergies indicates:   Allergen Reactions    Heparin analogues Other (See Comments)     HIT panel pending     Last reviewed on  4/4/2022 10:51 AM by Bruno Copeland      Tasks added this encounter   6/18/2022 - Refill Call (Auto Added)   Tasks due within next 3 months   8/13/2022 - Clinical - Follow Up Assesement (180 day)     Dee Braden, PharmD  [facility]  [address] [place], [place] [address]  [facility] [address]  Phone: [phone]  Fax: [phone]

## 2022-06-07 ENCOUNTER — SPECIALTY PHARMACY (OUTPATIENT)
Dept: PHARMACY | Facility: CLINIC | Age: 77
End: 2022-06-07
Payer: MEDICARE

## 2022-06-07 NOTE — TELEPHONE ENCOUNTER
Specialty Pharmacy - Clinical Reassessment    Specialty Medication Orders Linked to Encounter    Flowsheet Row Most Recent Value   Medication #1 osimertinib (TAGRISSO) 40 mg Tab (Order#[id number], Rx#[id number])        Patient Diagnosis   C34.92 - Adenocarcinoma of left lung, stage 4    Specialty clinical pharmacist review completed for an annual review of reassessment. Reviewed the following areas: current med list, reports of adverse effects, adherence and progress towards therapeutic goals.    Recommendations: none at this time. Patient is tolerating reduced dose of 40mg once daily.     Tasks added this encounter   11/27/2022 - Clinical - Follow Up Assesement (180 day)   Tasks due within next 3 months   6/18/2022 - Refill Call (Auto Added)     Prudence López, PharmD  [facility]  [address] [facility] [address]  Phone: [phone]  Fax: [phone]

## 2022-06-20 ENCOUNTER — SPECIALTY PHARMACY (OUTPATIENT)
Dept: PHARMACY | Facility: CLINIC | Age: 77
End: 2022-06-20
Payer: MEDICARE

## 2022-06-20 ENCOUNTER — HOSPITAL ENCOUNTER (EMERGENCY)
Facility: HOSPITAL | Age: 77
Discharge: HOME | End: 2022-06-20
Attending: FAMILY MEDICINE
Payer: MEDICARE

## 2022-06-20 ENCOUNTER — PATIENT MESSAGE (OUTPATIENT)
Dept: PHARMACY | Facility: CLINIC | Age: 77
End: 2022-06-20
Payer: MEDICARE

## 2022-06-20 VITALS
SYSTOLIC BLOOD PRESSURE: 129 MMHG | BODY MASS INDEX: 16.7 KG/M2 | OXYGEN SATURATION: 100 % | HEART RATE: 55 BPM | RESPIRATION RATE: 18 BRPM | DIASTOLIC BLOOD PRESSURE: 68 MMHG | WEIGHT: 130.13 LBS | TEMPERATURE: 98 F

## 2022-06-20 DIAGNOSIS — C34.92 ADENOCARCINOMA OF LEFT LUNG, STAGE 4: Primary | ICD-10-CM

## 2022-06-20 DIAGNOSIS — G89.29 CHRONIC MIDLINE LOW BACK PAIN WITHOUT SCIATICA: Primary | ICD-10-CM

## 2022-06-20 DIAGNOSIS — M54.50 CHRONIC MIDLINE LOW BACK PAIN WITHOUT SCIATICA: Primary | ICD-10-CM

## 2022-06-20 PROCEDURE — 99284 EMERGENCY DEPT VISIT MOD MDM: CPT | Mod: ER

## 2022-06-20 PROCEDURE — 96372 THER/PROPH/DIAG INJ SC/IM: CPT | Performed by: FAMILY MEDICINE

## 2022-06-20 PROCEDURE — 63600175 PHARM REV CODE 636 W HCPCS: Mod: ER | Performed by: FAMILY MEDICINE

## 2022-06-20 RX ORDER — KETOROLAC TROMETHAMINE 30 MG/ML
30 INJECTION, SOLUTION INTRAMUSCULAR; INTRAVENOUS
Status: COMPLETED | OUTPATIENT
Start: 2022-06-20 | End: 2022-06-20

## 2022-06-20 RX ORDER — HYDROMORPHONE HYDROCHLORIDE 1 MG/ML
0.5 INJECTION, SOLUTION INTRAMUSCULAR; INTRAVENOUS; SUBCUTANEOUS
Status: COMPLETED | OUTPATIENT
Start: 2022-06-20 | End: 2022-06-20

## 2022-06-20 RX ADMIN — HYDROMORPHONE HYDROCHLORIDE 0.5 MG: 1 INJECTION, SOLUTION INTRAMUSCULAR; INTRAVENOUS; SUBCUTANEOUS at 06:06

## 2022-06-20 RX ADMIN — KETOROLAC TROMETHAMINE 30 MG: 30 INJECTION, SOLUTION INTRAMUSCULAR; INTRAVENOUS at 07:06
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
## 2022-06-20 NOTE — ED TRIAGE NOTES
Reports to ED c c/o lower back pain x 1 day. Reports arthritis to back. Denies injury or fall. Denies relief c diclofenac gel. Hx of stage 4 lung CA

## 2022-06-20 NOTE — TELEPHONE ENCOUNTER
Specialty Pharmacy - Refill Coordination    Specialty Medication Orders Linked to Encounter    Flowsheet Row Most Recent Value   Medication #1 osimertinib (TAGRISSO) 40 mg Tab (Order#[id number], Rx#[id number])          Refill Questions - Documented Responses    Flowsheet Row Most Recent Value   Patient Availability and HIPAA Verification    Does patient want to proceed with activity? Yes   HIPAA/medical authority confirmed? Yes   Relationship to patient of person spoken to? Child   Refill Screening Questions    Changes to allergies? No   Changes to medications? No   New conditions since last clinic visit? No   Unplanned office visit, urgent care, ED, or hospital admission in the last 4 weeks? No   How does patient/caregiver feel medication is working? Good   Financial problems or insurance changes? No   How many doses of your specialty medications were missed in the last 4 weeks? 0   Would patient like to speak to a pharmacist? No   When does the patient need to receive the medication? 06/25/22   Refill Delivery Questions    How will the patient receive the medication? Delivery Emily   When does the patient need to receive the medication? 06/25/22   Shipping Address Prescription   Address in [facility] confirmed and updated if neccessary? Yes   Expected Copay ($) 0   Is the patient able to afford the medication copay? Yes   Payment Method zero copay   Days supply of Refill 30   Supplies needed? No supplies needed   Refill activity completed? Yes   Refill activity plan Refill scheduled   Shipment/Pickup Date: 06/22/22          Current Outpatient Medications   Medication Sig    diclofenac (FLECTOR) 1.3 % PT12 Apply 1 patch topically daily as needed.    HYDROcodone-acetaminophen (NORCO)  mg per tablet Take 1 tablet by mouth as needed. Take 1/2 tablet by mouth in the morning and 1/2 tablet by mouth in the evening as needed    osimertinib (TAGRISSO) 40 mg Tab Take 1 tablet (40 mg) by mouth once daily.     oxyCODONE-acetaminophen (PERCOCET) 2.5-325 mg per tablet Take 1 tablet by mouth every 6 (six) hours as needed for Pain.    oxyCODONE-acetaminophen (PERCOCET) 7.5-325 mg per tablet Take 1 tablet by mouth every 6 (six) hours as needed for Pain.    pantoprazole (PROTONIX) 40 MG tablet Take 1 tablet (40 mg total) by mouth once daily.    promethazine (PHENERGAN) 25 MG tablet Take 1 tablet (25 mg total) by mouth every 6 (six) hours as needed for Nausea.    scopolamine (TRANSDERM-SCOP) 1.3-1.5 mg (1 mg over 3 days) Place 1 patch onto the skin every 72 hours.   Last reviewed on 6/20/2022  6:10 AM by Graciela López RN    Review of patient's allergies indicates:   Allergen Reactions    Heparin analogues Other (See Comments)     HIT panel pending     Last reviewed on  6/20/2022 6:09 AM by Graciela López      Tasks added this encounter   7/18/2022 - Refill Call (Auto Added)   Tasks due within next 3 months   No tasks due.     Radha Arellano, CadenD  [facility]  [address] [place] [id number]  Phone: [phone]  Fax: [phone]

## 2022-07-14 ENCOUNTER — LAB VISIT (OUTPATIENT)
Dept: LAB | Facility: HOSPITAL | Age: 77
End: 2022-07-14
Attending: INTERNAL MEDICINE
Payer: MEDICARE

## 2022-07-14 DIAGNOSIS — C34.92 ADENOCARCINOMA OF LEFT LUNG, STAGE 4: ICD-10-CM

## 2022-07-14 DIAGNOSIS — F41.1 GENERALIZED ANXIETY DISORDER: ICD-10-CM

## 2022-07-14 LAB
ALBUMIN SERPL BCP-MCNC: 3.6 G/DL (ref 3.5–5.2)
ALP SERPL-CCNC: 63 U/L (ref 55–135)
ALT SERPL W/O P-5'-P-CCNC: 5 U/L (ref 10–44)
ANION GAP SERPL CALC-SCNC: 8 MMOL/L (ref 8–16)
AST SERPL-CCNC: 19 U/L (ref 10–40)
BASOPHILS # BLD AUTO: 0.03 K/UL (ref 0–0.2)
BASOPHILS NFR BLD: 0.6 % (ref 0–1.9)
BILIRUB SERPL-MCNC: 0.4 MG/DL (ref 0.1–1)
BUN SERPL-MCNC: 14 MG/DL (ref 8–23)
CALCIUM SERPL-MCNC: 9.6 MG/DL (ref 8.7–10.5)
CHLORIDE SERPL-SCNC: 111 MMOL/L (ref 95–110)
CO2 SERPL-SCNC: 26 MMOL/L (ref 23–29)
CREAT SERPL-MCNC: 0.9 MG/DL (ref 0.5–1.4)
DIFFERENTIAL METHOD: ABNORMAL
EOSINOPHIL # BLD AUTO: 0.2 K/UL (ref 0–0.5)
EOSINOPHIL NFR BLD: 3.6 % (ref 0–8)
ERYTHROCYTE [DISTWIDTH] IN BLOOD BY AUTOMATED COUNT: 15.2 % (ref 11.5–14.5)
EST. GFR  (AFRICAN AMERICAN): >60 ML/MIN/1.73 M^2
EST. GFR  (NON AFRICAN AMERICAN): >60 ML/MIN/1.73 M^2
GLUCOSE SERPL-MCNC: 72 MG/DL (ref 70–110)
HCT VFR BLD AUTO: 37.8 % (ref 37–48.5)
HGB BLD-MCNC: 11.7 G/DL (ref 12–16)
IMM GRANULOCYTES # BLD AUTO: 0.01 K/UL (ref 0–0.04)
IMM GRANULOCYTES NFR BLD AUTO: 0.2 % (ref 0–0.5)
LYMPHOCYTES # BLD AUTO: 1.8 K/UL (ref 1–4.8)
LYMPHOCYTES NFR BLD: 36.7 % (ref 18–48)
MAGNESIUM SERPL-MCNC: 2.1 MG/DL (ref 1.6–2.6)
MCH RBC QN AUTO: 25.9 PG (ref 27–31)
MCHC RBC AUTO-ENTMCNC: 31 G/DL (ref 32–36)
MCV RBC AUTO: 84 FL (ref 82–98)
MONOCYTES # BLD AUTO: 0.4 K/UL (ref 0.3–1)
MONOCYTES NFR BLD: 7.8 % (ref 4–15)
NEUTROPHILS # BLD AUTO: 2.4 K/UL (ref 1.8–7.7)
NEUTROPHILS NFR BLD: 51.1 % (ref 38–73)
NRBC BLD-RTO: 0 /100 WBC
PLATELET # BLD AUTO: 99 K/UL (ref 150–450)
PMV BLD AUTO: 13.3 FL (ref 9.2–12.9)
POTASSIUM SERPL-SCNC: 4.6 MMOL/L (ref 3.5–5.1)
PROT SERPL-MCNC: 6.8 G/DL (ref 6–8.4)
RBC # BLD AUTO: 4.52 M/UL (ref 4–5.4)
SODIUM SERPL-SCNC: 145 MMOL/L (ref 136–145)
TSH SERPL DL<=0.005 MIU/L-ACNC: 1.54 UIU/ML (ref 0.4–4)
WBC # BLD AUTO: 4.77 K/UL (ref 3.9–12.7)

## 2022-07-14 PROCEDURE — 84443 ASSAY THYROID STIM HORMONE: CPT | Performed by: INTERNAL MEDICINE

## 2022-07-14 PROCEDURE — 80053 COMPREHEN METABOLIC PANEL: CPT | Performed by: INTERNAL MEDICINE

## 2022-07-14 PROCEDURE — 83735 ASSAY OF MAGNESIUM: CPT | Performed by: INTERNAL MEDICINE

## 2022-07-14 PROCEDURE — 85025 COMPLETE CBC W/AUTO DIFF WBC: CPT | Performed by: INTERNAL MEDICINE

## 2022-07-14 PROCEDURE — 36415 COLL VENOUS BLD VENIPUNCTURE: CPT | Mod: PN | Performed by: INTERNAL MEDICINE

## 2022-07-18 ENCOUNTER — SPECIALTY PHARMACY (OUTPATIENT)
Dept: PHARMACY | Facility: CLINIC | Age: 77
End: 2022-07-18
Payer: MEDICARE

## 2022-07-18 NOTE — TELEPHONE ENCOUNTER
Specialty Pharmacy - Refill Coordination    Specialty Medication Orders Linked to Encounter    Flowsheet Row Most Recent Value   Medication #1 osimertinib (TAGRISSO) 40 mg Tab (Order#[id number], Rx#[id number])          Refill Questions - Documented Responses    Flowsheet Row Most Recent Value   Patient Availability and HIPAA Verification    Does patient want to proceed with activity? Yes   HIPAA/medical authority confirmed? Yes   Relationship to patient of person spoken to? Child   Refill Screening Questions    Changes to allergies? No   Changes to medications? No   New conditions since last clinic visit? No   Unplanned office visit, urgent care, ED, or hospital admission in the last 4 weeks? No   How does patient/caregiver feel medication is working? Good   Financial problems or insurance changes? No   How many doses of your specialty medications were missed in the last 4 weeks? 0   Would patient like to speak to a pharmacist? No   When does the patient need to receive the medication? 07/21/22   Refill Delivery Questions    How will the patient receive the medication? Delivery Emily   When does the patient need to receive the medication? 07/21/22   Shipping Address Temporary   Address in [facility] confirmed and updated if neccessary? Yes   Expected Copay ($) 0   Is the patient able to afford the medication copay? Yes   Payment Method zero copay   Days supply of Refill 30   Supplies needed? No supplies needed   Refill activity completed? Yes   Refill activity plan Refill scheduled   Shipment/Pickup Date: 07/20/22          Current Outpatient Medications   Medication Sig    diclofenac (FLECTOR) 1.3 % PT12 Apply 1 patch topically daily as needed.    HYDROcodone-acetaminophen (NORCO)  mg per tablet Take 1 tablet by mouth as needed. Take 1/2 tablet by mouth in the morning and 1/2 tablet by mouth in the evening as needed    osimertinib (TAGRISSO) 40 mg Tab Take 1 tablet (40 mg) by mouth once daily.     oxyCODONE-acetaminophen (PERCOCET) 2.5-325 mg per tablet Take 1 tablet by mouth every 6 (six) hours as needed for Pain.    oxyCODONE-acetaminophen (PERCOCET) 7.5-325 mg per tablet Take 1 tablet by mouth every 6 (six) hours as needed for Pain.    pantoprazole (PROTONIX) 40 MG tablet Take 1 tablet (40 mg total) by mouth once daily.    promethazine (PHENERGAN) 25 MG tablet Take 1 tablet (25 mg total) by mouth every 6 (six) hours as needed for Nausea.    scopolamine (TRANSDERM-SCOP) 1.3-1.5 mg (1 mg over 3 days) Place 1 patch onto the skin every 72 hours.   Last reviewed on 6/20/2022  6:10 AM by Graciela López RN    Review of patient's allergies indicates:   Allergen Reactions    Heparin analogues Other (See Comments)     HIT panel pending     Last reviewed on  6/20/2022 6:09 AM by Graciela López      Tasks added this encounter   8/13/2022 - Refill Call (Auto Added)   Tasks due within next 3 months   No tasks due.     [facility]  [address] [place] [id number]  Phone: [phone]  Fax: [phone]

## 2022-07-19 ENCOUNTER — OFFICE VISIT (OUTPATIENT)
Dept: HEMATOLOGY/ONCOLOGY | Facility: CLINIC | Age: 77
End: 2022-07-19
Payer: MEDICARE

## 2022-07-19 VITALS
OXYGEN SATURATION: 98 % | HEART RATE: 56 BPM | HEIGHT: 72 IN | TEMPERATURE: 98 F | BODY MASS INDEX: 17.83 KG/M2 | SYSTOLIC BLOOD PRESSURE: 99 MMHG | DIASTOLIC BLOOD PRESSURE: 63 MMHG | RESPIRATION RATE: 20 BRPM | WEIGHT: 131.63 LBS

## 2022-07-19 DIAGNOSIS — H54.8 LEGALLY BLIND: ICD-10-CM

## 2022-07-19 DIAGNOSIS — F41.1 GENERALIZED ANXIETY DISORDER: ICD-10-CM

## 2022-07-19 DIAGNOSIS — K86.1 IDIOPATHIC CHRONIC PANCREATITIS: ICD-10-CM

## 2022-07-19 DIAGNOSIS — C34.92 ADENOCARCINOMA OF LEFT LUNG, STAGE 4: Primary | ICD-10-CM

## 2022-07-19 DIAGNOSIS — C79.31 BRAIN METASTASIS: ICD-10-CM

## 2022-07-19 PROCEDURE — 99215 OFFICE O/P EST HI 40 MIN: CPT | Mod: S$GLB,,, | Performed by: INTERNAL MEDICINE

## 2022-07-19 PROCEDURE — 3074F PR MOST RECENT SYSTOLIC BLOOD PRESSURE < 130 MM HG: ICD-10-PCS | Mod: CPTII,S$GLB,, | Performed by: INTERNAL MEDICINE

## 2022-07-19 PROCEDURE — 3288F FALL RISK ASSESSMENT DOCD: CPT | Mod: CPTII,S$GLB,, | Performed by: INTERNAL MEDICINE

## 2022-07-19 PROCEDURE — 3288F PR FALLS RISK ASSESSMENT DOCUMENTED: ICD-10-PCS | Mod: CPTII,S$GLB,, | Performed by: INTERNAL MEDICINE

## 2022-07-19 PROCEDURE — 1160F RVW MEDS BY RX/DR IN RCRD: CPT | Mod: CPTII,S$GLB,, | Performed by: INTERNAL MEDICINE

## 2022-07-19 PROCEDURE — 3078F DIAST BP <80 MM HG: CPT | Mod: CPTII,S$GLB,, | Performed by: INTERNAL MEDICINE

## 2022-07-19 PROCEDURE — 1157F ADVNC CARE PLAN IN RCRD: CPT | Mod: CPTII,S$GLB,, | Performed by: INTERNAL MEDICINE

## 2022-07-19 PROCEDURE — 99215 PR OFFICE/OUTPT VISIT, EST, LEVL V, 40-54 MIN: ICD-10-PCS | Mod: S$GLB,,, | Performed by: INTERNAL MEDICINE

## 2022-07-19 PROCEDURE — 99999 PR PBB SHADOW E&M-EST. PATIENT-LVL III: CPT | Mod: PBBFAC,,, | Performed by: INTERNAL MEDICINE

## 2022-07-19 PROCEDURE — 1157F PR ADVANCE CARE PLAN OR EQUIV PRESENT IN MEDICAL RECORD: ICD-10-PCS | Mod: CPTII,S$GLB,, | Performed by: INTERNAL MEDICINE

## 2022-07-19 PROCEDURE — 1101F PR PT FALLS ASSESS DOC 0-1 FALLS W/OUT INJ PAST YR: ICD-10-PCS | Mod: CPTII,S$GLB,, | Performed by: INTERNAL MEDICINE

## 2022-07-19 PROCEDURE — 1159F MED LIST DOCD IN RCRD: CPT | Mod: CPTII,S$GLB,, | Performed by: INTERNAL MEDICINE

## 2022-07-19 PROCEDURE — 3078F PR MOST RECENT DIASTOLIC BLOOD PRESSURE < 80 MM HG: ICD-10-PCS | Mod: CPTII,S$GLB,, | Performed by: INTERNAL MEDICINE

## 2022-07-19 PROCEDURE — 1126F AMNT PAIN NOTED NONE PRSNT: CPT | Mod: CPTII,S$GLB,, | Performed by: INTERNAL MEDICINE

## 2022-07-19 PROCEDURE — 1159F PR MEDICATION LIST DOCUMENTED IN MEDICAL RECORD: ICD-10-PCS | Mod: CPTII,S$GLB,, | Performed by: INTERNAL MEDICINE

## 2022-07-19 PROCEDURE — 1126F PR PAIN SEVERITY QUANTIFIED, NO PAIN PRESENT: ICD-10-PCS | Mod: CPTII,S$GLB,, | Performed by: INTERNAL MEDICINE

## 2022-07-19 PROCEDURE — 3074F SYST BP LT 130 MM HG: CPT | Mod: CPTII,S$GLB,, | Performed by: INTERNAL MEDICINE

## 2022-07-19 PROCEDURE — 99999 PR PBB SHADOW E&M-EST. PATIENT-LVL III: ICD-10-PCS | Mod: PBBFAC,,, | Performed by: INTERNAL MEDICINE

## 2022-07-19 PROCEDURE — 1160F PR REVIEW ALL MEDS BY PRESCRIBER/CLIN PHARMACIST DOCUMENTED: ICD-10-PCS | Mod: CPTII,S$GLB,, | Performed by: INTERNAL MEDICINE

## 2022-07-19 PROCEDURE — 1101F PT FALLS ASSESS-DOCD LE1/YR: CPT | Mod: CPTII,S$GLB,, | Performed by: INTERNAL MEDICINE

## 2022-07-19 NOTE — PROGRESS NOTES
PATIENT: Nathan Simpson  MRN: [id number]  DATE: 7/19/2022    Diagnosis:   1. Adenocarcinoma of left lung, stage 4 EGFR positive    2. Idiopathic chronic pancreatitis    3. Legally blind    4. Generalized anxiety disorder     5. Brain metastasis      Chief Complaint:  Lung cancer    Subjective:     History of Present Illness:     Dx: Stage 4 Left lung adenocarcinoma    She has history of chronic idiopathic pancreatitis, CT abd Feb 2021 showed a lung mass.    02/21/2021 CT abdomen pelvis with contrast - Pulmonary nodule/mass within the left lower lobe. Sclerotic appearing lesion within the anterior aspect of L1 also noted, also new since examination of 2015 concerning for metastatic disease.    02/22/2021 CT chest with contrast - Masslike lesion of the left lower lobe concerning for pulmonary neoplasm. Mildly enlarged left hilar and AP window nodes concerning for metastasis.    02/23/2021 ultrasound abdomen - Pancreatic and biliary ductal dilatation again noted, corresponding to the appearance on CT. There is no sonographic evidence for cholelithiasis, however there is a focal area of gallbladder wall heterogeneous thickening, the possibility of adenomyosis is considered, however given the appearance a focal infiltrating process or neoplasm of the gallbladder wall cannot be excluded.  Close clinical and historical correlation and follow-up is recommended. There is no sonographic evidence for cholelithiasis on this exam.    03/15/2021 CT guided biopsy left lower lobe lung mass showed Lung adenocarcinoma.  It was PDL1 positive with 1% tumor cells being positive.  It was also positive for the EGFR Exon 19 mutation.  It was negative for other mutations ALK, BRAF, KRISHAN, MET, NTRK1, RET, ROS1    5/17/2021 PET scan - Hypermetabolic left lower lobe mass representing patient's biopsy-proven lung cancer.  PET-CT evidence of metastatic disease to numerous hilar and mediastinal lymph nodes, and bone. Subcentimeter right upper lobe  "pulmonary nodule, below the size threshold for PET.    5/17/2021 Brain MRI Subcentimeter question ring-enhancing lesion in the left inferior parietal lobule somewhat distorted by motion cannot exclude parenchymal metastases.    Patient is in a wheelchair.  She cannot see very well.  She lives in [place] with her daughter, Ms. Kitchen    INTERVAL HISTORY:   -here for follow-up of stage IV EGFR Exon 19 positive lung adenocarcinoma  -6/5/21-started osimertinib 80 mg QD, due to side effects the dose is decreased to 40 mg and she is doing much better  -follows with GI for h/o pancreatitis    Past Medical, Surgical, Family and Social History Reviewed.    Medications and Allergies reviewed    Review of Systems   Constitutional: Positive for fatigue. Negative for fever and unexpected weight change.   Eyes: Negative for visual disturbance.   Musculoskeletal: Positive for arthralgias.   Psychiatric/Behavioral: Positive for behavioral problems.       Objective:     Vitals:    07/19/22 1106   BP: 99/63   BP Location: Left arm   Patient Position: Sitting   BP Method: Medium (Automatic)   Pulse: (!) 56   Resp: 20   Temp: 97.8 °F (36.6 °C)   TempSrc: Oral   SpO2: 98%   Weight: 59.7 kg (131 lb 9.8 oz)   Height: 6' 1" (1.854 m)     BMI: Body mass index is 17.36 kg/m².    Physical Exam  Vitals reviewed.   Constitutional:       General: She is not in acute distress.  Pulmonary:      Effort: Pulmonary effort is normal.      Breath sounds: Normal breath sounds.   Neurological:      Mental Status: She is alert and oriented to person, place, and time.         Assessment:       1. Adenocarcinoma of left lung, stage 4 EGFR positive    2. Idiopathic chronic pancreatitis    3. Legally blind    4. Generalized anxiety disorder     5. Brain metastasis      Plan:   EGFR positive Stage 4 Left Lung adenocarcinoma  -EGFR Exon 19 mutation positive  -PDL1 positive 1% tumor cells positive  -she started osimertinib 80 mg qd 6/5/2021  -due to vasculitis " like side effects, dose was decreased to 40 mg q.d. and she is tolerating this much better  -labs CBC, CMP reviewed  -reviewed CT scan from 04/11, there is only a marginal increase in the spiculated mass in the medial left lower lobe, no other new areas of malignant disease, hence will continue osimertinib 40 mg q.d.  -will check CBC, CMP, CT chest/abdomen/pelvis and brain MRI and see her back for follow-up in 2 months    Keratitis/cutaneous vasculitis  -much improved after decreasing osimertinib to 40 mg QD    ?Brain metastasis  -there is a question of metastatic spread of lung cancer to the brain based on Brain MRI done in may 2021  -repeat MRI brain 08/03/2021 shows improvement and decreased edema signal  -check brain MRI in 2 months    Idiopathic pancreatitis  -EUS plus/minus ERCP per GI    Thrombocytopenia  -secondary to osimertinib  -noted  -will monitor    Legally blind  -needs help with a sitter    Labs in [place] before f/u for pt convenience.

## 2022-07-25 ENCOUNTER — TELEPHONE (OUTPATIENT)
Dept: HEMATOLOGY/ONCOLOGY | Facility: CLINIC | Age: 77
End: 2022-07-25
Payer: MEDICARE

## 2022-07-25 NOTE — TELEPHONE ENCOUNTER
----- Message from Conchis Edwards sent at 7/25/2022 10:33 AM CDT -----  Type:  Needs Medical Advice    Who Called: pt daughter Fidelina   Symptoms (please be specific): would like to verify if faxed paperwork was received   Fax sent on last week from [facility] either Thursday or Friday     Would the patient rather a call back or a response via [facility]?  Call   Best Call Back Number: [phone]  Additional Information:

## 2022-08-12 ENCOUNTER — PATIENT MESSAGE (OUTPATIENT)
Dept: PHARMACY | Facility: CLINIC | Age: 77
End: 2022-08-12
Payer: MEDICARE

## 2022-08-16 ENCOUNTER — SPECIALTY PHARMACY (OUTPATIENT)
Dept: PHARMACY | Facility: CLINIC | Age: 77
End: 2022-08-16
Payer: MEDICARE

## 2022-08-16 NOTE — TELEPHONE ENCOUNTER
Specialty Pharmacy - Refill Coordination    Specialty Medication Orders Linked to Encounter    Flowsheet Row Most Recent Value   Medication #1 osimertinib (TAGRISSO) 40 mg Tab (Order#[id number], Rx#[id number])          Refill Questions - Documented Responses    Flowsheet Row Most Recent Value   Patient Availability and HIPAA Verification    Does patient want to proceed with activity? Yes   HIPAA/medical authority confirmed? Yes   Relationship to patient of person spoken to? Child   Refill Screening Questions    Changes to allergies? No   Changes to medications? No   New conditions since last clinic visit? No   Unplanned office visit, urgent care, ED, or hospital admission in the last 4 weeks? No   How does patient/caregiver feel medication is working? Good   Financial problems or insurance changes? No   How many doses of your specialty medications were missed in the last 4 weeks? 0   Would patient like to speak to a pharmacist? No   When does the patient need to receive the medication? 08/21/22   Refill Delivery Questions    How will the patient receive the medication? Delivery Emily   When does the patient need to receive the medication? 08/21/22   Shipping Address Temporary   Address in [facility] confirmed and updated if neccessary? Yes   Expected Copay ($) 0   Is the patient able to afford the medication copay? Yes   Payment Method zero copay   Days supply of Refill 30   Supplies needed? No supplies needed   Refill activity completed? Yes   Refill activity plan Refill scheduled   Shipment/Pickup Date: 08/17/22          Current Outpatient Medications   Medication Sig    diclofenac (FLECTOR) 1.3 % PT12 Apply 1 patch topically daily as needed.    HYDROcodone-acetaminophen (NORCO)  mg per tablet Take 1 tablet by mouth as needed. Take 1/2 tablet by mouth in the morning and 1/2 tablet by mouth in the evening as needed    osimertinib (TAGRISSO) 40 mg Tab Take 1 tablet (40 mg) by mouth once daily.     oxyCODONE-acetaminophen (PERCOCET) 2.5-325 mg per tablet Take 1 tablet by mouth every 6 (six) hours as needed for Pain.    oxyCODONE-acetaminophen (PERCOCET) 7.5-325 mg per tablet Take 1 tablet by mouth every 6 (six) hours as needed for Pain.    pantoprazole (PROTONIX) 40 MG tablet Take 1 tablet (40 mg total) by mouth once daily.    promethazine (PHENERGAN) 25 MG tablet Take 1 tablet (25 mg total) by mouth every 6 (six) hours as needed for Nausea.    scopolamine (TRANSDERM-SCOP) 1.3-1.5 mg (1 mg over 3 days) Place 1 patch onto the skin every 72 hours.   Last reviewed on 7/19/2022 11:34 AM by Bruno Copeland MD    Review of patient's allergies indicates:   Allergen Reactions    Heparin analogues Other (See Comments)     HIT panel pending     Last reviewed on  7/19/2022 11:34 AM by Bruno Copeland      Tasks added this encounter   9/13/2022 - Refill Call (Auto Added)   Tasks due within next 3 months   No tasks due.     Pastora Camarena  [facility]  [address] [facility] [address]  Phone: [phone]  Fax: [phone]

## 2022-09-12 ENCOUNTER — TELEPHONE (OUTPATIENT)
Dept: HEMATOLOGY/ONCOLOGY | Facility: CLINIC | Age: 77
End: 2022-09-12
Payer: MEDICARE

## 2022-09-12 NOTE — TELEPHONE ENCOUNTER
RE: Reschedule Appointment Call     Attempted to call patient regarding need to reschedule their appointment with Dr. Copeland. However, no answer. Left detailed message to reschedule with other provider. In basket message sent to staff for follow up.     Future Appointments   Date Time Provider Department Center   9/19/2022  8:00 AM [facility] CT1 LIMIT 400 LBS [facility]PH CT SCAN [facility]   9/19/2022  8:45 AM [facility] CT1 LIMIT 400 LBS RVPH CT SCAN [facility]   9/19/2022 10:00 AM [facility]  LB LIMIT [facility] MRI [facility]   9/22/2022 11:00 AM Bruno Copeland MD [facility] HEM ONC [facility]       Call time: 1 minutes

## 2022-09-12 NOTE — TELEPHONE ENCOUNTER
----- Message from Olga Barahona RN sent at 9/12/2022 11:28 AM CDT -----  Regarding: FW: missed call  Please call the patient's daughter. Let her know I rescheduled her office visit to same day as her original appt 9/22. The time changed to 11:30a. Scans and labs did not change   ----- Message -----  From: Kishore Santana  Sent: 9/12/2022  11:18 AM CDT  To: [facility]or[facility] Carr Staff  Subject: missed call                                      Type:  Patient Returning Call    Who Called:patient     Who Left Message for Patient:office    Does the patient know what this is regarding?:missed call     Would the patient rather a call back or a response via [facility]? Call back     Best Call Back Number:[phone]  Additional Information: n/a

## 2022-09-13 ENCOUNTER — PATIENT MESSAGE (OUTPATIENT)
Dept: PHARMACY | Facility: CLINIC | Age: 77
End: 2022-09-13
Payer: MEDICARE

## 2022-09-16 ENCOUNTER — SPECIALTY PHARMACY (OUTPATIENT)
Dept: PHARMACY | Facility: CLINIC | Age: 77
End: 2022-09-16
Payer: MEDICARE

## 2022-09-16 NOTE — TELEPHONE ENCOUNTER
Specialty Pharmacy - Refill Coordination    Specialty Medication Orders Linked to Encounter      Flowsheet Row Most Recent Value   Medication #1 osimertinib (TAGRISSO) 40 mg Tab (Order#[id number], Rx#[id number])            Refill Questions - Documented Responses      Flowsheet Row Most Recent Value   Patient Availability and HIPAA Verification    Does patient want to proceed with activity? Yes   HIPAA/medical authority confirmed? Yes   Relationship to patient of person spoken to? Self   Refill Screening Questions    Changes to allergies? No   Changes to medications? No   New conditions since last clinic visit? No   Unplanned office visit, urgent care, ED, or hospital admission in the last 4 weeks? No   How does patient/caregiver feel medication is working? Good   Financial problems or insurance changes? No   How many doses of your specialty medications were missed in the last 4 weeks? 0   Would patient like to speak to a pharmacist? No   When does the patient need to receive the medication? 09/20/22   Refill Delivery Questions    How will the patient receive the medication? Delivery Emily   When does the patient need to receive the medication? 09/20/22   Shipping Address Temporary   Address in [facility] confirmed and updated if neccessary? Yes   Expected Copay ($) 0   Is the patient able to afford the medication copay? Yes   Payment Method zero copay   Days supply of Refill 30   Supplies needed? No supplies needed   Refill activity completed? Yes   Refill activity plan Refill scheduled   Shipment/Pickup Date: 09/19/22            Current Outpatient Medications   Medication Sig    diclofenac (FLECTOR) 1.3 % PT12 Apply 1 patch topically daily as needed.    HYDROcodone-acetaminophen (NORCO)  mg per tablet Take 1 tablet by mouth as needed. Take 1/2 tablet by mouth in the morning and 1/2 tablet by mouth in the evening as needed    osimertinib (TAGRISSO) 40 mg Tab Take 1 tablet (40 mg) by mouth once daily.     oxyCODONE-acetaminophen (PERCOCET) 2.5-325 mg per tablet Take 1 tablet by mouth every 6 (six) hours as needed for Pain.    oxyCODONE-acetaminophen (PERCOCET) 7.5-325 mg per tablet Take 1 tablet by mouth every 6 (six) hours as needed for Pain.    pantoprazole (PROTONIX) 40 MG tablet Take 1 tablet (40 mg total) by mouth once daily.    promethazine (PHENERGAN) 25 MG tablet Take 1 tablet (25 mg total) by mouth every 6 (six) hours as needed for Nausea.    scopolamine (TRANSDERM-SCOP) 1.3-1.5 mg (1 mg over 3 days) Place 1 patch onto the skin every 72 hours.   Last reviewed on 7/19/2022 11:34 AM by Bruno Copeland MD    Review of patient's allergies indicates:   Allergen Reactions    Heparin analogues Other (See Comments)     HIT panel pending     Last reviewed on  7/19/2022 11:34 AM by Bruno Copeland      Tasks added this encounter   10/13/2022 - Refill Call (Auto Added)   Tasks due within next 3 months   11/27/2022 - Clinical - Follow Up Assesement (180 day)     Blessing Fernandez, Patient Care Assistant  [facility]  [address]  [facility] [address]  Phone: [phone]  Fax: [phone]

## 2022-09-19 ENCOUNTER — HOSPITAL ENCOUNTER (OUTPATIENT)
Dept: RADIOLOGY | Facility: HOSPITAL | Age: 77
Discharge: HOME | End: 2022-09-19
Attending: INTERNAL MEDICINE
Payer: MEDICARE

## 2022-09-19 ENCOUNTER — TELEPHONE (OUTPATIENT)
Dept: HEMATOLOGY/ONCOLOGY | Facility: CLINIC | Age: 77
End: 2022-09-19
Payer: MEDICARE

## 2022-09-19 DIAGNOSIS — C34.92 ADENOCARCINOMA OF LEFT LUNG, STAGE 4: ICD-10-CM

## 2022-09-19 PROCEDURE — 74177 CT ABD & PELVIS W/CONTRAST: CPT | Mod: TC,PO

## 2022-09-19 PROCEDURE — 70553 MRI BRAIN STEM W/O & W/DYE: CPT | Mod: TC,PO

## 2022-09-19 PROCEDURE — 74160 CT ABDOMEN W/CONTRAST: CPT | Mod: TC,PO

## 2022-09-19 PROCEDURE — 71260 CT THORAX DX C+: CPT | Mod: TC,PO

## 2022-09-19 PROCEDURE — A9585 GADOBUTROL INJECTION: HCPCS | Mod: PO | Performed by: INTERNAL MEDICINE

## 2022-09-19 PROCEDURE — 25500020 PHARM REV CODE 255: Mod: PO | Performed by: INTERNAL MEDICINE

## 2022-09-19 RX ORDER — GADOBUTROL 604.72 MG/ML
6 INJECTION INTRAVENOUS
Status: COMPLETED | OUTPATIENT
Start: 2022-09-19 | End: 2022-09-19

## 2022-09-19 RX ADMIN — IOHEXOL 50 ML: 350 INJECTION, SOLUTION INTRAVENOUS at 12:09

## 2022-09-19 RX ADMIN — GADOBUTROL 6 ML: 604.72 INJECTION INTRAVENOUS at 11:09

## 2022-09-19 RX ADMIN — IOHEXOL 100 ML: 350 INJECTION, SOLUTION INTRAVENOUS at 12:09

## 2022-09-19 NOTE — TELEPHONE ENCOUNTER
----- Message from Lisha CALIXTO Dior sent at 9/19/2022  1:29 PM CDT -----  Regarding: CT dept /Speedy  Contact: CT dept /Speedy/[id number]  Kandace/CT anilt /Speedy is reporting the patient had a chest and abdomen contrast study performed today. Pt cannot receive any more contrast today. Images of the pelvis were not obtained due to the scan cutting off at the uterus. If you would like additional images of the pelvis, please send a separate order. It will need to scheduled on another day. Kandace/CT Patricia apologizes for inconvenience. Thanks

## 2022-09-22 ENCOUNTER — OFFICE VISIT (OUTPATIENT)
Dept: HEMATOLOGY/ONCOLOGY | Facility: CLINIC | Age: 77
End: 2022-09-22
Payer: MEDICARE

## 2022-09-22 VITALS
SYSTOLIC BLOOD PRESSURE: 115 MMHG | RESPIRATION RATE: 16 BRPM | BODY MASS INDEX: 17.8 KG/M2 | WEIGHT: 134.94 LBS | HEART RATE: 61 BPM | OXYGEN SATURATION: 99 % | DIASTOLIC BLOOD PRESSURE: 56 MMHG

## 2022-09-22 DIAGNOSIS — T45.1X5A CHEMOTHERAPY-INDUCED THROMBOCYTOPENIA: ICD-10-CM

## 2022-09-22 DIAGNOSIS — C79.31 BRAIN METASTASIS: ICD-10-CM

## 2022-09-22 DIAGNOSIS — H54.8 LEGALLY BLIND: ICD-10-CM

## 2022-09-22 DIAGNOSIS — R53.83 FATIGUE: ICD-10-CM

## 2022-09-22 DIAGNOSIS — K86.1 IDIOPATHIC CHRONIC PANCREATITIS: ICD-10-CM

## 2022-09-22 DIAGNOSIS — C34.92 ADENOCARCINOMA OF LEFT LUNG, STAGE 4: Primary | ICD-10-CM

## 2022-09-22 DIAGNOSIS — D69.6 THROMBOCYTOPENIA, UNSPECIFIED: ICD-10-CM

## 2022-09-22 DIAGNOSIS — D69.59 CHEMOTHERAPY-INDUCED THROMBOCYTOPENIA: ICD-10-CM

## 2022-09-22 PROCEDURE — 99999 PR PBB SHADOW E&M-EST. PATIENT-LVL IV: ICD-10-PCS | Mod: PBBFAC,,, | Performed by: NURSE PRACTITIONER

## 2022-09-22 PROCEDURE — 1159F PR MEDICATION LIST DOCUMENTED IN MEDICAL RECORD: ICD-10-PCS | Mod: CPTII,S$GLB,, | Performed by: NURSE PRACTITIONER

## 2022-09-22 PROCEDURE — 3078F DIAST BP <80 MM HG: CPT | Mod: CPTII,S$GLB,, | Performed by: NURSE PRACTITIONER

## 2022-09-22 PROCEDURE — 3074F SYST BP LT 130 MM HG: CPT | Mod: CPTII,S$GLB,, | Performed by: NURSE PRACTITIONER

## 2022-09-22 PROCEDURE — 1101F PT FALLS ASSESS-DOCD LE1/YR: CPT | Mod: CPTII,S$GLB,, | Performed by: NURSE PRACTITIONER

## 2022-09-22 PROCEDURE — 99999 PR PBB SHADOW E&M-EST. PATIENT-LVL IV: CPT | Mod: PBBFAC,,, | Performed by: NURSE PRACTITIONER

## 2022-09-22 PROCEDURE — 99215 PR OFFICE/OUTPT VISIT, EST, LEVL V, 40-54 MIN: ICD-10-PCS | Mod: S$GLB,,, | Performed by: NURSE PRACTITIONER

## 2022-09-22 PROCEDURE — 1101F PR PT FALLS ASSESS DOC 0-1 FALLS W/OUT INJ PAST YR: ICD-10-PCS | Mod: CPTII,S$GLB,, | Performed by: NURSE PRACTITIONER

## 2022-09-22 PROCEDURE — 1126F PR PAIN SEVERITY QUANTIFIED, NO PAIN PRESENT: ICD-10-PCS | Mod: CPTII,S$GLB,, | Performed by: NURSE PRACTITIONER

## 2022-09-22 PROCEDURE — 3288F FALL RISK ASSESSMENT DOCD: CPT | Mod: CPTII,S$GLB,, | Performed by: NURSE PRACTITIONER

## 2022-09-22 PROCEDURE — 1157F ADVNC CARE PLAN IN RCRD: CPT | Mod: CPTII,S$GLB,, | Performed by: NURSE PRACTITIONER

## 2022-09-22 PROCEDURE — 99215 OFFICE O/P EST HI 40 MIN: CPT | Mod: S$GLB,,, | Performed by: NURSE PRACTITIONER

## 2022-09-22 PROCEDURE — 3078F PR MOST RECENT DIASTOLIC BLOOD PRESSURE < 80 MM HG: ICD-10-PCS | Mod: CPTII,S$GLB,, | Performed by: NURSE PRACTITIONER

## 2022-09-22 PROCEDURE — 1157F PR ADVANCE CARE PLAN OR EQUIV PRESENT IN MEDICAL RECORD: ICD-10-PCS | Mod: CPTII,S$GLB,, | Performed by: NURSE PRACTITIONER

## 2022-09-22 PROCEDURE — 1126F AMNT PAIN NOTED NONE PRSNT: CPT | Mod: CPTII,S$GLB,, | Performed by: NURSE PRACTITIONER

## 2022-09-22 PROCEDURE — 1159F MED LIST DOCD IN RCRD: CPT | Mod: CPTII,S$GLB,, | Performed by: NURSE PRACTITIONER

## 2022-09-22 PROCEDURE — 3288F PR FALLS RISK ASSESSMENT DOCUMENTED: ICD-10-PCS | Mod: CPTII,S$GLB,, | Performed by: NURSE PRACTITIONER

## 2022-09-22 PROCEDURE — 3074F PR MOST RECENT SYSTOLIC BLOOD PRESSURE < 130 MM HG: ICD-10-PCS | Mod: CPTII,S$GLB,, | Performed by: NURSE PRACTITIONER

## 2022-09-22 NOTE — PROGRESS NOTES
PATIENT: Nathan Simpson  MRN: [id number]  DATE: 9/22/2022    Diagnosis:   1. Adenocarcinoma of left lung, stage 4    2. Fatigue    3. Idiopathic chronic pancreatitis    4. Legally blind    5. Brain metastasis    6. Chemotherapy-induced thrombocytopenia    7. Thrombocytopenia, unspecified        Chief Complaint:  Lung cancer    Subjective:     History of Present Illness:     Dx: Stage 4 Left lung adenocarcinoma    She has history of chronic idiopathic pancreatitis, CT abd Feb 2021 showed a lung mass.    02/21/2021 CT abdomen pelvis with contrast - Pulmonary nodule/mass within the left lower lobe. Sclerotic appearing lesion within the anterior aspect of L1 also noted, also new since examination of 2015 concerning for metastatic disease.    02/22/2021 CT chest with contrast - Masslike lesion of the left lower lobe concerning for pulmonary neoplasm. Mildly enlarged left hilar and AP window nodes concerning for metastasis.    02/23/2021 ultrasound abdomen - Pancreatic and biliary ductal dilatation again noted, corresponding to the appearance on CT. There is no sonographic evidence for cholelithiasis, however there is a focal area of gallbladder wall heterogeneous thickening, the possibility of adenomyosis is considered, however given the appearance a focal infiltrating process or neoplasm of the gallbladder wall cannot be excluded.  Close clinical and historical correlation and follow-up is recommended. There is no sonographic evidence for cholelithiasis on this exam.    03/15/2021 CT guided biopsy left lower lobe lung mass showed Lung adenocarcinoma.  It was PDL1 positive with 1% tumor cells being positive.  It was also positive for the EGFR Exon 19 mutation.  It was negative for other mutations ALK, BRAF, KRISHAN, MET, NTRK1, RET, ROS1    5/17/2021 PET scan - Hypermetabolic left lower lobe mass representing patient's biopsy-proven lung cancer.  PET-CT evidence of metastatic disease to numerous hilar and mediastinal lymph nodes,  and bone. Subcentimeter right upper lobe pulmonary nodule, below the size threshold for PET.    5/17/2021 Brain MRI Subcentimeter question ring-enhancing lesion in the left inferior parietal lobule somewhat distorted by motion cannot exclude parenchymal metastases.    Patient is in a wheelchair.  She cannot see very well.  She lives in [place] with her daughter, Ms. Kitchen    INTERVAL HISTORY:   -here for follow-up of stage IV EGFR Exon 19 positive lung adenocarcinoma  -6/5/21-started osimertinib 80 mg QD, due to side effects the dose is decreased to 40 mg and she continues to do much better on reduced dose  -appetite has been good with 3 pound weight gain  -pt denies fevers, dizziness, chest pain, sob, abdominal pain, n/v/d, constipation, pain  -daughter is with pt and states she has been doing well  -follows with GI for h/o pancreatitis    Past Medical, Surgical, Family and Social History Reviewed.    Medications and Allergies reviewed    Review of Systems   Constitutional:  Positive for fatigue. Negative for fever and unexpected weight change.   Eyes:  Negative for visual disturbance.   Musculoskeletal:  Positive for arthralgias.   Psychiatric/Behavioral:  Positive for behavioral problems.      Objective:     Vitals:    09/22/22 1135   BP: (!) 115/56   BP Location: Left arm   Patient Position: Sitting   BP Method: Medium (Automatic)   Pulse: 61   Resp: 16   SpO2: 99%   Weight: 61.2 kg (134 lb 14.7 oz)       BMI: Body mass index is 17.8 kg/m².    Physical Exam  Vitals reviewed.   Constitutional:       General: She is not in acute distress.  Pulmonary:      Effort: Pulmonary effort is normal.      Breath sounds: Normal breath sounds.   Neurological:      Mental Status: She is alert and oriented to person, place, and time.     CT Abdomen 9/19/22  Impression:     1. There are ill-defined masses in both lungs. One of the larger ones is located in the posteromedial aspect of the left lower lobe.  On the current  examination it measures 26 mm in craniocaudal dimension by 17 mm in AP dimension by 23 mm in medial-lateral dimension.  On the prior examination it measured 18 mm in craniocaudal dimension by 16 mm in AP dimension by 23 mm in medial-lateral dimension.  This is consistent with the patient's history and characteristic of metastatic disease.  2. There are persistent sclerotic changes in the L1 vertebral body.  3. There are several oval-shaped areas of hypodensity in the liver.  One of the larger ones measures 12 mm and is located in the dome of the liver.  On the prior examination it measured 12 mm.  4. There is a 57 mm exophytic hypodense mass off of the posterolateral aspect of midpole of the left kidney. This mass has a Hounsfield measurement of 7.  This is characteristic of a cyst.  All CT scans at this facility use dose modulation, iterative reconstruction, and/or weight base dosing when appropriate to reduce radiation dose when appropriate to reduce radiation dose to as low as reasonably achievable.     MRI brain 9/19/22  Impression:     1. No evidence of metastatic disease to the brain.  Findings are similar to the previous MRI from 05/17/2021.  2. Unchanged encephalomalacia involving the anteroinferior most aspects of the temporal lobes, left more extensive than right as well as the anteroinferior left frontal lobe.  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       Assessment:       1. Adenocarcinoma of left lung, stage 4    2. Fatigue    3. Idiopathic chronic pancreatitis    4. Legally blind    5. Brain metastasis    6. Chemotherapy-induced thrombocytopenia    7. Thrombocytopenia, unspecified        Plan:   EGFR positive Stage 4 Left Lung adenocarcinoma  -EGFR Exon 19 mutation positive  -PDL1 positive 1% tumor cells positive  -she started osimertinib 80 mg qd 6/5/2021  -due to vasculitis like side effects, dose was decreased to 40 mg q.d. and she is tolerating this much better  -labs CBC, CMP  reviewed  -reviewed CT scan from 04/11, there is only a marginal increase in the spiculated mass in the medial left lower lobe, no other new areas of malignant disease, hence will continue osimertinib 40 mg q.d.  -9/19/22 MRI brain with no evidence metastatic brain disease, CT abd completed, was not able to do chest as well as pt reached max contrast dosing. Able to view lungs with Ct Abd, will not currently reorder ct of chest as discussed with Dr Mcqueen, imagings reviewed with Dr Mcqueen but will repeat in 3 months as ordered. Pt and pt's daughter are in agreement with this.  -9/19/22 Ct abdomen shows no trace growth on one mass as reviewed with pt and daughter today.  -will check CBC, CMP, CT chest/abdomen/pelvis and brain MRI and see her back for follow-up in 3 months    Keratitis/cutaneous vasculitis  -much improved after decreasing osimertinib to 40 mg QD, no current complaints    ?Brain metastasis  -there is a question of metastatic spread of lung cancer to the brain based on Brain MRI done in may 2021  -repeat MRI brain 08/03/2021 shows improvement and decreased edema signal  -repeat MRI of 9/19/2022 no evidence metastatic brain disease.  -will repeat in 3 months    Idiopathic pancreatitis  -EUS plus/minus ERCP per GI    Thrombocytopenia  -secondary to osimertinib  -noted  -will monitor    Legally blind  -needs help with a sitter    Labs in [place] before f/u for pt convenience.            Jackie Rae, NP-C  [facility]  Hematology/Oncology  [address]  [place]  [address]  [phone]

## 2022-10-13 ENCOUNTER — PATIENT MESSAGE (OUTPATIENT)
Dept: PHARMACY | Facility: CLINIC | Age: 77
End: 2022-10-13
Payer: MEDICARE

## 2022-10-18 ENCOUNTER — SPECIALTY PHARMACY (OUTPATIENT)
Dept: PHARMACY | Facility: CLINIC | Age: 77
End: 2022-10-18
Payer: MEDICARE

## 2022-10-18 NOTE — TELEPHONE ENCOUNTER
Specialty Pharmacy - Refill Coordination    Specialty Medication Orders Linked to Encounter      Flowsheet Row Most Recent Value   Medication #1 osimertinib (TAGRISSO) 40 mg Tab (Order#[id number], Rx#[id number])            Refill Questions - Documented Responses      Flowsheet Row Most Recent Value   Patient Availability and HIPAA Verification    Does patient want to proceed with activity? Yes   HIPAA/medical authority confirmed? Yes   Relationship to patient of person spoken to? Self   Refill Screening Questions    Changes to allergies? No   Changes to medications? No   New conditions since last clinic visit? No   Unplanned office visit, urgent care, ED, or hospital admission in the last 4 weeks? No   How does patient/caregiver feel medication is working? Good   Financial problems or insurance changes? No   How many doses of your specialty medications were missed in the last 4 weeks? 0   Would patient like to speak to a pharmacist? No   When does the patient need to receive the medication? 10/20/22   Refill Delivery Questions    How will the patient receive the medication? MEDRx   When does the patient need to receive the medication? 10/20/22   Shipping Address Home   Address in [facility] confirmed and updated if neccessary? Yes   Expected Copay ($) 0   Is the patient able to afford the medication copay? Yes   Payment Method zero copay   Days supply of Refill 30   Supplies needed? No supplies needed   Refill activity completed? Yes   Refill activity plan Refill scheduled   Shipment/Pickup Date: 10/19/22            Current Outpatient Medications   Medication Sig    diclofenac (FLECTOR) 1.3 % PT12 Apply 1 patch topically daily as needed.    HYDROcodone-acetaminophen (NORCO)  mg per tablet Take 1 tablet by mouth as needed. Take 1/2 tablet by mouth in the morning and 1/2 tablet by mouth in the evening as needed    osimertinib (TAGRISSO) 40 mg Tab Take 1 tablet (40 mg) by mouth once daily.     oxyCODONE-acetaminophen (PERCOCET) 2.5-325 mg per tablet Take 1 tablet by mouth every 6 (six) hours as needed for Pain.    oxyCODONE-acetaminophen (PERCOCET) 7.5-325 mg per tablet Take 1 tablet by mouth every 6 (six) hours as needed for Pain.    pantoprazole (PROTONIX) 40 MG tablet Take 1 tablet (40 mg total) by mouth once daily.    promethazine (PHENERGAN) 25 MG tablet Take 1 tablet (25 mg total) by mouth every 6 (six) hours as needed for Nausea.    scopolamine (TRANSDERM-SCOP) 1.3-1.5 mg (1 mg over 3 days) Place 1 patch onto the skin every 72 hours.   Last reviewed on 9/22/2022 11:35 AM by Zahra Cage RN    Review of patient's allergies indicates:   Allergen Reactions    Heparin analogues Other (See Comments)     HIT panel pending     Last reviewed on  9/22/2022 11:35 AM by Zahra Cage      Tasks added this encounter   11/12/2022 - Refill Call (Auto Added)   Tasks due within next 3 months   11/27/2022 - Clinical - Follow Up Assesement (180 day)     [facility]  [address]  [facility] [address]  Phone: [phone]  Fax: [phone]

## 2022-11-14 ENCOUNTER — SPECIALTY PHARMACY (OUTPATIENT)
Dept: PHARMACY | Facility: CLINIC | Age: 77
End: 2022-11-14
Payer: MEDICARE

## 2022-11-14 NOTE — TELEPHONE ENCOUNTER
Specialty Pharmacy - Refill Coordination    Specialty Medication Orders Linked to Encounter      Flowsheet Row Most Recent Value   Medication #1 osimertinib (TAGRISSO) 40 mg Tab (Order#[id number], Rx#[id number])            Refill Questions - Documented Responses      Flowsheet Row Most Recent Value   Patient Availability and HIPAA Verification    Does patient want to proceed with activity? Yes   HIPAA/medical authority confirmed? Yes   Relationship to patient of person spoken to? Care Giver   Refill Screening Questions    Changes to allergies? No   Changes to medications? No   New conditions since last clinic visit? No   Unplanned office visit, urgent care, ED, or hospital admission in the last 4 weeks? No   How does patient/caregiver feel medication is working? Good   Financial problems or insurance changes? No   How many doses of your specialty medications were missed in the last 4 weeks? 0   Would patient like to speak to a pharmacist? No   When does the patient need to receive the medication? 11/21/22   Refill Delivery Questions    How will the patient receive the medication? MEDRx   When does the patient need to receive the medication? 11/21/22   Shipping Address Home   Address in [facility] confirmed and updated if neccessary? Yes   Expected Copay ($) 0   Is the patient able to afford the medication copay? Yes   Payment Method zero copay   Days supply of Refill 30   Supplies needed? No supplies needed   Refill activity completed? Yes   Refill activity plan Refill scheduled   Shipment/Pickup Date: 11/16/22            Current Outpatient Medications   Medication Sig    diclofenac (FLECTOR) 1.3 % PT12 Apply 1 patch topically daily as needed.    HYDROcodone-acetaminophen (NORCO)  mg per tablet Take 1 tablet by mouth as needed. Take 1/2 tablet by mouth in the morning and 1/2 tablet by mouth in the evening as needed    osimertinib (TAGRISSO) 40 mg Tab Take 1 tablet (40 mg) by mouth once daily.     oxyCODONE-acetaminophen (PERCOCET) 2.5-325 mg per tablet Take 1 tablet by mouth every 6 (six) hours as needed for Pain.    oxyCODONE-acetaminophen (PERCOCET) 7.5-325 mg per tablet Take 1 tablet by mouth every 6 (six) hours as needed for Pain.    pantoprazole (PROTONIX) 40 MG tablet Take 1 tablet (40 mg total) by mouth once daily.    promethazine (PHENERGAN) 25 MG tablet Take 1 tablet (25 mg total) by mouth every 6 (six) hours as needed for Nausea.    scopolamine (TRANSDERM-SCOP) 1.3-1.5 mg (1 mg over 3 days) Place 1 patch onto the skin every 72 hours.   Last reviewed on 9/22/2022 11:35 AM by Zahra Cage RN    Review of patient's allergies indicates:   Allergen Reactions    Heparin analogues Other (See Comments)     HIT panel pending     Last reviewed on  9/22/2022 11:35 AM by Zahra Cage      Tasks added this encounter   12/14/2022 - Refill Call (Auto Added)   Tasks due within next 3 months   11/27/2022 - Clinical - Follow Up Assesement (180 day)     [facility]  [address]  [facility] [address]  Phone: [phone]  Fax: [phone]

## 2022-11-29 ENCOUNTER — SPECIALTY PHARMACY (OUTPATIENT)
Dept: PHARMACY | Facility: CLINIC | Age: 77
End: 2022-11-29
Payer: MEDICARE

## 2022-11-29 NOTE — TELEPHONE ENCOUNTER
Specialty Pharmacy - Clinical Reassessment    Specialty Medication Orders Linked to Encounter      Flowsheet Row Most Recent Value   Medication #1 osimertinib (TAGRISSO) 40 mg Tab (Order#[id number], Rx#[id number])          Patient Diagnosis   C34.92 - Adenocarcinoma of left lung, stage 4    Ethyl M Calvin is a 77 y.o. female, who is followed by the specialty pharmacy service for management and education of her Tagrisso.  She has been on therapy with Tagrisso for 17 months.  I have reviewed her electronic medical record and current medication list and determined that specialty medication adjustment Is not needed at this time.    Patient has not experienced adverse events.  She Is adherent reporting 0 missed doses since last review.  Adherence has been encouraged with the following mechanism(s): Habit.  She is meeting goals of therapy and will continue treatment.        11/14/2022 10/18/2022 9/16/2022 8/16/2022 7/18/2022 6/20/2022 5/23/2022   Follow Up Review   # of missed doses 0 0 0 0 0 0 0   New Medications? No No No No No No No   New Conditions? No No No No No No No   New Allergies? No No No No No No No   Med Effective? Good Good Good Good Good Good Good   Urgent Care? No No No No No No No   Requested Pharmacist? No No No No No No No       Multiple values from one day are sorted in reverse-chronological order         Therapy is appropriate to continue.    Therapy is effective: Yes  On scale of 1 to 10, how does patient rank quality of life? (10 - Best): Unable to Assess  Recommendations: none at this time.  Review Method: Chart Review    Tasks added this encounter   No tasks added.   Tasks due within next 3 months   11/27/2022 - Clinical - Follow Up Assesement (180 day)  12/14/2022 - Refill Call (Auto Added)     Reilly Adame, [facility] - Specialty Pharmacy  [facility] [facility] [address]  Phone: [phone]  Fax: [phone]

## 2022-12-14 ENCOUNTER — PATIENT MESSAGE (OUTPATIENT)
Dept: PHARMACY | Facility: CLINIC | Age: 77
End: 2022-12-14
Payer: MEDICARE

## 2022-12-16 ENCOUNTER — SPECIALTY PHARMACY (OUTPATIENT)
Dept: PHARMACY | Facility: CLINIC | Age: 77
End: 2022-12-16
Payer: MEDICARE

## 2022-12-16 NOTE — TELEPHONE ENCOUNTER
Specialty Pharmacy - Refill Coordination    Specialty Medication Orders Linked to Encounter      Flowsheet Row Most Recent Value   Medication #1 osimertinib (TAGRISSO) 40 mg Tab (Order#[id number], Rx#[id number])            Refill Questions - Documented Responses      Flowsheet Row Most Recent Value   Patient Availability and HIPAA Verification    Does patient want to proceed with activity? Yes   HIPAA/medical authority confirmed? Yes   Relationship to patient of person spoken to? Child   Refill Screening Questions    Changes to allergies? No   Changes to medications? No   New conditions since last clinic visit? No   Unplanned office visit, urgent care, ED, or hospital admission in the last 4 weeks? No   How does patient/caregiver feel medication is working? Good   Financial problems or insurance changes? No   How many doses of your specialty medications were missed in the last 4 weeks? 0   Would patient like to speak to a pharmacist? No   When does the patient need to receive the medication? 12/21/22   Refill Delivery Questions    How will the patient receive the medication? MEDRx   When does the patient need to receive the medication? 12/21/22   Shipping Address Home   Address in [facility] confirmed and updated if neccessary? Yes   Expected Copay ($) 0   Is the patient able to afford the medication copay? Yes   Payment Method zero copay   Days supply of Refill 30   Supplies needed? No supplies needed   Refill activity completed? No   Refill activity plan Refill scheduled   Shipment/Pickup Date: 12/19/22            Current Outpatient Medications   Medication Sig    diclofenac (FLECTOR) 1.3 % PT12 Apply 1 patch topically daily as needed.    HYDROcodone-acetaminophen (NORCO)  mg per tablet Take 1 tablet by mouth as needed. Take 1/2 tablet by mouth in the morning and 1/2 tablet by mouth in the evening as needed    osimertinib (TAGRISSO) 40 mg Tab Take 1 tablet (40 mg) by mouth once daily.     oxyCODONE-acetaminophen (PERCOCET) 2.5-325 mg per tablet Take 1 tablet by mouth every 6 (six) hours as needed for Pain.    oxyCODONE-acetaminophen (PERCOCET) 7.5-325 mg per tablet Take 1 tablet by mouth every 6 (six) hours as needed for Pain.    pantoprazole (PROTONIX) 40 MG tablet Take 1 tablet (40 mg total) by mouth once daily.    promethazine (PHENERGAN) 25 MG tablet Take 1 tablet (25 mg total) by mouth every 6 (six) hours as needed for Nausea.    scopolamine (TRANSDERM-SCOP) 1.3-1.5 mg (1 mg over 3 days) Place 1 patch onto the skin every 72 hours.   Last reviewed on 11/29/2022 12:51 PM by Reilly Adame PharmD    Review of patient's allergies indicates:   Allergen Reactions    Heparin analogues Other (See Comments)     HIT panel pending     Last reviewed on  11/29/2022 12:51 PM by Reilly Adame      Tasks added this encounter   No tasks added.   Tasks due within next 3 months   1/13/2023 - Refill Call (Auto Added)     Dee Braden, PharmD  [facility]  [address] [place] [address]  Phone: [phone]  Fax: [phone]

## 2022-12-19 ENCOUNTER — HOSPITAL ENCOUNTER (OUTPATIENT)
Dept: RADIOLOGY | Facility: HOSPITAL | Age: 77
Discharge: HOME | End: 2022-12-19
Attending: NURSE PRACTITIONER
Payer: MEDICARE

## 2022-12-19 DIAGNOSIS — C34.92 ADENOCARCINOMA OF LEFT LUNG, STAGE 4: ICD-10-CM

## 2022-12-19 PROCEDURE — 74176 CT ABD & PELVIS W/O CONTRAST: CPT | Mod: TC,PO

## 2022-12-21 DIAGNOSIS — C79.31 BRAIN METASTASIS: ICD-10-CM

## 2022-12-21 DIAGNOSIS — C34.92 ADENOCARCINOMA OF LEFT LUNG, STAGE 4: Primary | ICD-10-CM

## 2022-12-23 ENCOUNTER — PATIENT MESSAGE (OUTPATIENT)
Dept: HEMATOLOGY/ONCOLOGY | Facility: CLINIC | Age: 77
End: 2022-12-23
Payer: MEDICARE

## 2023-01-06 ENCOUNTER — OFFICE VISIT (OUTPATIENT)
Dept: HEMATOLOGY/ONCOLOGY | Facility: CLINIC | Age: 78
End: 2023-01-06
Payer: MEDICARE

## 2023-01-06 VITALS
OXYGEN SATURATION: 100 % | RESPIRATION RATE: 18 BRPM | SYSTOLIC BLOOD PRESSURE: 110 MMHG | DIASTOLIC BLOOD PRESSURE: 59 MMHG | WEIGHT: 137.13 LBS | HEART RATE: 58 BPM | BODY MASS INDEX: 18.09 KG/M2

## 2023-01-06 DIAGNOSIS — D69.59 CHEMOTHERAPY-INDUCED THROMBOCYTOPENIA: ICD-10-CM

## 2023-01-06 DIAGNOSIS — K86.1 IDIOPATHIC CHRONIC PANCREATITIS: ICD-10-CM

## 2023-01-06 DIAGNOSIS — C79.31 BRAIN METASTASIS: ICD-10-CM

## 2023-01-06 DIAGNOSIS — C34.92 ADENOCARCINOMA OF LEFT LUNG, STAGE 4: ICD-10-CM

## 2023-01-06 DIAGNOSIS — L95.9 CUTANEOUS VASCULITIS: ICD-10-CM

## 2023-01-06 DIAGNOSIS — T45.1X5A CHEMOTHERAPY-INDUCED THROMBOCYTOPENIA: ICD-10-CM

## 2023-01-06 DIAGNOSIS — H54.8 LEGALLY BLIND: ICD-10-CM

## 2023-01-06 DIAGNOSIS — C34.92 ADENOCARCINOMA OF LEFT LUNG, STAGE 4: Primary | ICD-10-CM

## 2023-01-06 PROCEDURE — 3288F FALL RISK ASSESSMENT DOCD: CPT | Mod: CPTII,S$GLB,, | Performed by: NURSE PRACTITIONER

## 2023-01-06 PROCEDURE — 99215 PR OFFICE/OUTPT VISIT, EST, LEVL V, 40-54 MIN: ICD-10-PCS | Mod: S$GLB,,, | Performed by: NURSE PRACTITIONER

## 2023-01-06 PROCEDURE — 3078F DIAST BP <80 MM HG: CPT | Mod: CPTII,S$GLB,, | Performed by: NURSE PRACTITIONER

## 2023-01-06 PROCEDURE — 99215 OFFICE O/P EST HI 40 MIN: CPT | Mod: S$GLB,,, | Performed by: NURSE PRACTITIONER

## 2023-01-06 PROCEDURE — 1157F PR ADVANCE CARE PLAN OR EQUIV PRESENT IN MEDICAL RECORD: ICD-10-PCS | Mod: CPTII,S$GLB,, | Performed by: NURSE PRACTITIONER

## 2023-01-06 PROCEDURE — 3074F PR MOST RECENT SYSTOLIC BLOOD PRESSURE < 130 MM HG: ICD-10-PCS | Mod: CPTII,S$GLB,, | Performed by: NURSE PRACTITIONER

## 2023-01-06 PROCEDURE — 3288F PR FALLS RISK ASSESSMENT DOCUMENTED: ICD-10-PCS | Mod: CPTII,S$GLB,, | Performed by: NURSE PRACTITIONER

## 2023-01-06 PROCEDURE — 1126F AMNT PAIN NOTED NONE PRSNT: CPT | Mod: CPTII,S$GLB,, | Performed by: NURSE PRACTITIONER

## 2023-01-06 PROCEDURE — 3078F PR MOST RECENT DIASTOLIC BLOOD PRESSURE < 80 MM HG: ICD-10-PCS | Mod: CPTII,S$GLB,, | Performed by: NURSE PRACTITIONER

## 2023-01-06 PROCEDURE — 1101F PT FALLS ASSESS-DOCD LE1/YR: CPT | Mod: CPTII,S$GLB,, | Performed by: NURSE PRACTITIONER

## 2023-01-06 PROCEDURE — 1157F ADVNC CARE PLAN IN RCRD: CPT | Mod: CPTII,S$GLB,, | Performed by: NURSE PRACTITIONER

## 2023-01-06 PROCEDURE — 1159F MED LIST DOCD IN RCRD: CPT | Mod: CPTII,S$GLB,, | Performed by: NURSE PRACTITIONER

## 2023-01-06 PROCEDURE — 1160F RVW MEDS BY RX/DR IN RCRD: CPT | Mod: CPTII,S$GLB,, | Performed by: NURSE PRACTITIONER

## 2023-01-06 PROCEDURE — 1159F PR MEDICATION LIST DOCUMENTED IN MEDICAL RECORD: ICD-10-PCS | Mod: CPTII,S$GLB,, | Performed by: NURSE PRACTITIONER

## 2023-01-06 PROCEDURE — 99999 PR PBB SHADOW E&M-EST. PATIENT-LVL III: CPT | Mod: PBBFAC,,, | Performed by: NURSE PRACTITIONER

## 2023-01-06 PROCEDURE — 99999 PR PBB SHADOW E&M-EST. PATIENT-LVL III: ICD-10-PCS | Mod: PBBFAC,,, | Performed by: NURSE PRACTITIONER

## 2023-01-06 PROCEDURE — 1101F PR PT FALLS ASSESS DOC 0-1 FALLS W/OUT INJ PAST YR: ICD-10-PCS | Mod: CPTII,S$GLB,, | Performed by: NURSE PRACTITIONER

## 2023-01-06 PROCEDURE — 1160F PR REVIEW ALL MEDS BY PRESCRIBER/CLIN PHARMACIST DOCUMENTED: ICD-10-PCS | Mod: CPTII,S$GLB,, | Performed by: NURSE PRACTITIONER

## 2023-01-06 PROCEDURE — 3074F SYST BP LT 130 MM HG: CPT | Mod: CPTII,S$GLB,, | Performed by: NURSE PRACTITIONER

## 2023-01-06 PROCEDURE — 1126F PR PAIN SEVERITY QUANTIFIED, NO PAIN PRESENT: ICD-10-PCS | Mod: CPTII,S$GLB,, | Performed by: NURSE PRACTITIONER

## 2023-01-06 NOTE — PROGRESS NOTES
PATIENT: Nathan Simpson  MRN: [id number]  DATE: 1/6/2023    Diagnosis:   1. Adenocarcinoma of left lung, stage 4    2. Brain metastasis    3. Idiopathic chronic pancreatitis    4. Legally blind    5. Chemotherapy-induced thrombocytopenia    6. Cutaneous vasculitis          Chief Complaint:  Lung cancer    Subjective:     History of Present Illness:     Dx: Stage 4 Left lung adenocarcinoma    She has history of chronic idiopathic pancreatitis, CT abd Feb 2021 showed a lung mass.    02/21/2021 CT abdomen pelvis with contrast - Pulmonary nodule/mass within the left lower lobe. Sclerotic appearing lesion within the anterior aspect of L1 also noted, also new since examination of 2015 concerning for metastatic disease.    02/22/2021 CT chest with contrast - Masslike lesion of the left lower lobe concerning for pulmonary neoplasm. Mildly enlarged left hilar and AP window nodes concerning for metastasis.    02/23/2021 ultrasound abdomen - Pancreatic and biliary ductal dilatation again noted, corresponding to the appearance on CT. There is no sonographic evidence for cholelithiasis, however there is a focal area of gallbladder wall heterogeneous thickening, the possibility of adenomyosis is considered, however given the appearance a focal infiltrating process or neoplasm of the gallbladder wall cannot be excluded.  Close clinical and historical correlation and follow-up is recommended. There is no sonographic evidence for cholelithiasis on this exam.    03/15/2021 CT guided biopsy left lower lobe lung mass showed Lung adenocarcinoma.  It was PDL1 positive with 1% tumor cells being positive.  It was also positive for the EGFR Exon 19 mutation.  It was negative for other mutations ALK, BRAF, KRISHAN, MET, NTRK1, RET, ROS1    5/17/2021 PET scan - Hypermetabolic left lower lobe mass representing patient's biopsy-proven lung cancer.  PET-CT evidence of metastatic disease to numerous hilar and mediastinal lymph nodes, and bone.  "Subcentimeter right upper lobe pulmonary nodule, below the size threshold for PET.    5/17/2021 Brain MRI Subcentimeter question ring-enhancing lesion in the left inferior parietal lobule somewhat distorted by motion cannot exclude parenchymal metastases.    Patient is in a wheelchair.  She cannot see very well.  She lives in [place] with her daughter, Ms. Kitchen    INTERVAL HISTORY:   -here for follow-up of stage IV EGFR Exon 19 positive lung adenocarcinoma, daughter with pt  -6/5/21-started osimertinib 80 mg QD, due to side effects the dose is decreased to 40 mg and she continues to do much better on reduced dose, on lowered dose she has had no further issues with hand sores  -appetite has been good with 3 pound weight gain, pt and daughter are happy about this  -last month buried 70yr old sister who passed from colon cancer, late diagnosis  -pt denies fevers, dizziness, chest pain, sob, abdominal pain, n/v/d, constipation, she does intermittent chronic low back pain "from her arthritis" daughter states  -new script requested for Tagrisso today, 1 refill remains  -MRI brain was rescheduled     -follows with GI for h/o pancreatitis    Past Medical, Surgical, Family and Social History Reviewed.    Medications and Allergies reviewed    Review of Systems   Constitutional:  Positive for fatigue. Negative for fever and unexpected weight change.   Eyes:  Negative for visual disturbance.   Respiratory:  Negative for shortness of breath.    Cardiovascular:  Negative for chest pain.   Gastrointestinal:  Negative for abdominal pain, blood in stool, constipation, diarrhea, nausea and vomiting.   Genitourinary:  Negative for dysuria and hematuria.   Musculoskeletal:  Positive for arthralgias.   Skin:  Negative for wound.   Neurological:  Negative for dizziness and headaches.   Hematological:  Negative for adenopathy.     Objective:     Vitals:    01/06/23 1051   BP: (!) 110/59   BP Location: Right arm   Patient Position: Sitting "   BP Method: Medium (Automatic)   Pulse: (!) 58   Resp: 18   SpO2: 100%   Weight: 62.2 kg (137 lb 2 oz)       BMI: Body mass index is 18.09 kg/m².    Physical Exam  Vitals reviewed.   Constitutional:       General: She is not in acute distress.  Pulmonary:      Effort: Pulmonary effort is normal.      Breath sounds: Normal breath sounds.   Neurological:      Mental Status: She is alert and oriented to person, place, and time.       IMAGING    CT Chest Abd Pelvis 12/19/22  I have reviewed the images with Dr Mcqueen  Impression:     Findings relatively unchanged compared to 09/19/2022.     2.3 x 1.9 cm nodule in the infrahilar left lower lobe.  1.6 x 1.0 cm nodule in the posterior left lung base.     8 mm sub solid nodular density in the posterior right upper lobe.     Right greater than left apical fibrotic type changes with additional fibrosis versus subsegmental atelectasis in the dependent right greater than left lower lobes.     Unchanged sclerotic lesion of L1.    CT Abdomen 9/19/22  Impression:     1. There are ill-defined masses in both lungs. One of the larger ones is located in the posteromedial aspect of the left lower lobe.  On the current examination it measures 26 mm in craniocaudal dimension by 17 mm in AP dimension by 23 mm in medial-lateral dimension.  On the prior examination it measured 18 mm in craniocaudal dimension by 16 mm in AP dimension by 23 mm in medial-lateral dimension.  This is consistent with the patient's history and characteristic of metastatic disease.  2. There are persistent sclerotic changes in the L1 vertebral body.  3. There are several oval-shaped areas of hypodensity in the liver.  One of the larger ones measures 12 mm and is located in the dome of the liver.  On the prior examination it measured 12 mm.  4. There is a 57 mm exophytic hypodense mass off of the posterolateral aspect of midpole of the left kidney. This mass has a Hounsfield measurement of 7.  This is  characteristic of a cyst.  All CT scans at this facility use dose modulation, iterative reconstruction, and/or weight base dosing when appropriate to reduce radiation dose when appropriate to reduce radiation dose to as low as reasonably achievable.     MRI brain 9/19/22  Impression:     1. No evidence of metastatic disease to the brain.  Findings are similar to the previous MRI from 05/17/2021.  2. Unchanged encephalomalacia involving the anteroinferior most aspects of the temporal lobes, left more extensive than right as well as the anteroinferior left frontal lobe.  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       Assessment:       1. Adenocarcinoma of left lung, stage 4    2. Brain metastasis    3. Idiopathic chronic pancreatitis    4. Legally blind    5. Chemotherapy-induced thrombocytopenia    6. Cutaneous vasculitis          Plan:   EGFR positive Stage 4 Left Lung adenocarcinoma  -EGFR Exon 19 mutation positive  -PDL1 positive 1% tumor cells positive  -she started osimertinib 80 mg qd 6/5/2021  -due to vasculitis like side effects, dose was decreased to 40 mg q.d. and she is tolerating this much better  -labs CBC, CMP reviewed  -reviewed CT scan from 04/11, there is only a marginal increase in the spiculated mass in the medial left lower lobe, no other new areas of malignant disease, hence will continue osimertinib 40 mg q.d.  -9/19/22 MRI brain with no evidence metastatic brain disease, CT abd completed, was not able to do chest as well as pt reached max contrast dosing. Able to view lungs with Ct Abd, will not currently reorder ct of chest as discussed with Dr Mcqueen, imagings reviewed with Dr Mcqueen but will repeat in 3 months as ordered. Pt and pt's daughter are in agreement with this.  -9/19/22 Ct abdomen shows no trace growth on one mass as reviewed with pt and daughter today.  -12/19/22 CT CAP stable as reviewed with Dr Mcqueen  -labs 12/19/22 reviewed, stable, creatinine is  normal  -pending MRI scheduled for 1/23/23  -will check CBC, CMP, mag, tsh, CT chest/abdomen/pelvisI and see her back for follow-up in 3 months with Dr Mcqueen    Keratitis/cutaneous vasculitis  -has resolved after decreasing osimertinib to 40 mg QD, no current complaints    ?Brain metastasis  -there is a question of metastatic spread of lung cancer to the brain based on Brain MRI done in may 2021  -repeat MRI brain 08/03/2021 shows improvement and decreased edema signal  -repeat MRI of 9/19/2022 no evidence metastatic brain disease.  -MRI scheduled for 1/23/23   -continue to monitor    Idiopathic pancreatitis  -EUS plus/minus ERCP per GI    Thrombocytopenia  -stable, 116 K/uL (12/19/22)  -secondary to osimertinib  -noted  -will monitor    Legally blind  -needs help with a sitter    Labs in [place] before f/u for pt convenience.            Jackie Rae, NP-C  [facility]  Hematology/Oncology  [address]  [place]  [address]  [phone]                         Patient

## 2023-01-13 ENCOUNTER — PATIENT MESSAGE (OUTPATIENT)
Dept: PHARMACY | Facility: CLINIC | Age: 78
End: 2023-01-13
Payer: MEDICARE

## 2023-01-17 ENCOUNTER — SPECIALTY PHARMACY (OUTPATIENT)
Dept: PHARMACY | Facility: CLINIC | Age: 78
End: 2023-01-17
Payer: MEDICARE

## 2023-01-17 ENCOUNTER — PATIENT MESSAGE (OUTPATIENT)
Dept: PHARMACY | Facility: CLINIC | Age: 78
End: 2023-01-17
Payer: MEDICARE

## 2023-01-17 NOTE — TELEPHONE ENCOUNTER
Specialty Pharmacy - Refill Coordination    Specialty Medication Orders Linked to Encounter      Flowsheet Row Most Recent Value   Medication #1 osimertinib (TAGRISSO) 40 mg Tab (Order#[id number], Rx#[id number])            Refill Questions - Documented Responses      Flowsheet Row Most Recent Value   Patient Availability and HIPAA Verification    Does patient want to proceed with activity? Yes   HIPAA/medical authority confirmed? Yes   Relationship to patient of person spoken to? Self   Refill Screening Questions    Changes to allergies? No   Changes to medications? No   New conditions since last clinic visit? No   Unplanned office visit, urgent care, ED, or hospital admission in the last 4 weeks? No   How does patient/caregiver feel medication is working? Good   Financial problems or insurance changes? No   How many doses of your specialty medications were missed in the last 4 weeks? 0   Would patient like to speak to a pharmacist? No   When does the patient need to receive the medication? 01/20/23   Refill Delivery Questions    How will the patient receive the medication? MEDRx   When does the patient need to receive the medication? 01/20/23   Shipping Address Temporary   Address in [facility] confirmed and updated if neccessary? Yes   Expected Copay ($) 0   Is the patient able to afford the medication copay? Yes   Payment Method zero copay   Days supply of Refill 30   Supplies needed? No supplies needed   Refill activity completed? Yes   Refill activity plan Refill scheduled   Shipment/Pickup Date: 01/19/23            Current Outpatient Medications   Medication Sig    diclofenac (FLECTOR) 1.3 % PT12 Apply 1 patch topically daily as needed.    HYDROcodone-acetaminophen (NORCO)  mg per tablet Take 1 tablet by mouth as needed. Take 1/2 tablet by mouth in the morning and 1/2 tablet by mouth in the evening as needed    osimertinib (TAGRISSO) 40 mg Tab Take 1 tablet (40 mg) by mouth once daily.     oxyCODONE-acetaminophen (PERCOCET) 2.5-325 mg per tablet Take 1 tablet by mouth every 6 (six) hours as needed for Pain.    oxyCODONE-acetaminophen (PERCOCET) 7.5-325 mg per tablet Take 1 tablet by mouth every 6 (six) hours as needed for Pain.    pantoprazole (PROTONIX) 40 MG tablet Take 1 tablet (40 mg total) by mouth once daily.    promethazine (PHENERGAN) 25 MG tablet Take 1 tablet (25 mg total) by mouth every 6 (six) hours as needed for Nausea.    scopolamine (TRANSDERM-SCOP) 1.3-1.5 mg (1 mg over 3 days) Place 1 patch onto the skin every 72 hours.   Last reviewed on 1/6/2023 11:37 AM by Jackie Rae NP    Review of patient's allergies indicates:   Allergen Reactions    Heparin analogues Other (See Comments)     HIT panel pending     Last reviewed on  1/6/2023 1:42 PM by Garry Mcqueen      Tasks added this encounter   No tasks added.   Tasks due within next 3 months   1/13/2023 - Refill Call (Auto Added)     Liyah Tirado, CadenD  [facility]  [address] [place] [address]  Phone: [phone]  Fax: [phone]

## 2023-01-20 ENCOUNTER — TELEPHONE (OUTPATIENT)
Dept: HEMATOLOGY/ONCOLOGY | Facility: CLINIC | Age: 78
End: 2023-01-20
Payer: MEDICARE

## 2023-01-20 NOTE — TELEPHONE ENCOUNTER
Spoke with Susie and she fixed the pt appointment so she will be able to have her MRI  of the brain did 1/23/2023.

## 2023-01-23 ENCOUNTER — HOSPITAL ENCOUNTER (OUTPATIENT)
Dept: RADIOLOGY | Facility: HOSPITAL | Age: 78
Discharge: HOME | End: 2023-01-23
Attending: NURSE PRACTITIONER
Payer: MEDICARE

## 2023-01-23 DIAGNOSIS — C34.92 ADENOCARCINOMA OF LEFT LUNG, STAGE 4: ICD-10-CM

## 2023-01-23 DIAGNOSIS — C79.31 BRAIN METASTASIS: ICD-10-CM

## 2023-01-23 PROCEDURE — 70551 MRI BRAIN WITHOUT CONTRAST: ICD-10-PCS | Mod: 26,,, | Performed by: RADIOLOGY

## 2023-01-23 PROCEDURE — 70551 MRI BRAIN STEM W/O DYE: CPT | Mod: 26,,, | Performed by: RADIOLOGY

## 2023-01-23 PROCEDURE — 70551 MRI BRAIN STEM W/O DYE: CPT | Mod: TC,PO

## 2023-02-13 ENCOUNTER — PATIENT MESSAGE (OUTPATIENT)
Dept: PHARMACY | Facility: CLINIC | Age: 78
End: 2023-02-13
Payer: MEDICARE

## 2023-02-14 ENCOUNTER — SPECIALTY PHARMACY (OUTPATIENT)
Dept: PHARMACY | Facility: CLINIC | Age: 78
End: 2023-02-14
Payer: MEDICARE

## 2023-02-14 NOTE — TELEPHONE ENCOUNTER
Specialty Pharmacy - Refill Coordination    Specialty Medication Orders Linked to Encounter      Flowsheet Row Most Recent Value   Medication #1 osimertinib (TAGRISSO) 40 mg Tab (Order#[id number], Rx#[id number])            Refill Questions - Documented Responses      Flowsheet Row Most Recent Value   Patient Availability and HIPAA Verification    Does patient want to proceed with activity? Yes   HIPAA/medical authority confirmed? Yes   Relationship to patient of person spoken to? Child   Refill Screening Questions    Changes to allergies? No   Changes to medications? No   New conditions since last clinic visit? No   Unplanned office visit, urgent care, ED, or hospital admission in the last 4 weeks? No   How does patient/caregiver feel medication is working? Good   Financial problems or insurance changes? No   How many doses of your specialty medications were missed in the last 4 weeks? 0   Would patient like to speak to a pharmacist? No   When does the patient need to receive the medication? 02/19/23   Refill Delivery Questions    How will the patient receive the medication? MEDRx   When does the patient need to receive the medication? 02/19/23   Shipping Address Prescription   Address in [facility] confirmed and updated if neccessary? Yes   Expected Copay ($) 0   Is the patient able to afford the medication copay? Yes   Payment Method zero copay   Days supply of Refill 30   Supplies needed? No supplies needed   Refill activity completed? Yes   Refill activity plan Refill scheduled   Shipment/Pickup Date: 02/16/23            Current Outpatient Medications   Medication Sig    diclofenac (FLECTOR) 1.3 % PT12 Apply 1 patch topically daily as needed.    HYDROcodone-acetaminophen (NORCO)  mg per tablet Take 1 tablet by mouth as needed. Take 1/2 tablet by mouth in the morning and 1/2 tablet by mouth in the evening as needed    osimertinib (TAGRISSO) 40 mg Tab Take 1 tablet (40 mg) by mouth once daily.     oxyCODONE-acetaminophen (PERCOCET) 2.5-325 mg per tablet Take 1 tablet by mouth every 6 (six) hours as needed for Pain.    oxyCODONE-acetaminophen (PERCOCET) 7.5-325 mg per tablet Take 1 tablet by mouth every 6 (six) hours as needed for Pain.    pantoprazole (PROTONIX) 40 MG tablet Take 1 tablet (40 mg total) by mouth once daily.    promethazine (PHENERGAN) 25 MG tablet Take 1 tablet (25 mg total) by mouth every 6 (six) hours as needed for Nausea.   Last reviewed on 1/6/2023 11:37 AM by Jackie Rae NP    Review of patient's allergies indicates:   Allergen Reactions    Heparin analogues Other (See Comments)     HIT panel pending     Last reviewed on  1/6/2023 1:42 PM by Garry Mcqueen      Tasks added this encounter   3/14/2023 - Refill Call (Auto Added)   Tasks due within next 3 months   No tasks due.     Suzie Lees, PharmD  [facility]  [address] [facility] [address]  Phone: [phone]  Fax: [phone]

## 2023-03-14 ENCOUNTER — PATIENT MESSAGE (OUTPATIENT)
Dept: PHARMACY | Facility: CLINIC | Age: 78
End: 2023-03-14
Payer: MEDICARE

## 2023-03-17 ENCOUNTER — SPECIALTY PHARMACY (OUTPATIENT)
Dept: PHARMACY | Facility: CLINIC | Age: 78
End: 2023-03-17
Payer: MEDICARE

## 2023-03-17 NOTE — TELEPHONE ENCOUNTER
Specialty Pharmacy - Refill Coordination    Specialty Medication Orders Linked to Encounter      Flowsheet Row Most Recent Value   Medication #1 osimertinib (TAGRISSO) 40 mg Tab (Order#[id number], Rx#[id number])            Refill Questions - Documented Responses      Flowsheet Row Most Recent Value   Patient Availability and HIPAA Verification    Does patient want to proceed with activity? Yes   HIPAA/medical authority confirmed? Yes   Relationship to patient of person spoken to? Child   Refill Screening Questions    Changes to allergies? No   Changes to medications? No   New conditions since last clinic visit? No   Unplanned office visit, urgent care, ED, or hospital admission in the last 4 weeks? No   How does patient/caregiver feel medication is working? Good   Financial problems or insurance changes? No   How many doses of your specialty medications were missed in the last 4 weeks? 0   Would patient like to speak to a pharmacist? No   When does the patient need to receive the medication? 03/22/23   Refill Delivery Questions    How will the patient receive the medication? MEDRx   When does the patient need to receive the medication? 03/22/23   Shipping Address Temporary   Address in [facility] confirmed and updated if neccessary? Yes   Expected Copay ($) 0   Is the patient able to afford the medication copay? Yes   Payment Method zero copay   Days supply of Refill 30   Supplies needed? No supplies needed   Refill activity completed? Yes   Refill activity plan Refill scheduled   Shipment/Pickup Date: 03/20/23            Current Outpatient Medications   Medication Sig    diclofenac (FLECTOR) 1.3 % PT12 Apply 1 patch topically daily as needed.    HYDROcodone-acetaminophen (NORCO)  mg per tablet Take 1 tablet by mouth as needed. Take 1/2 tablet by mouth in the morning and 1/2 tablet by mouth in the evening as needed    osimertinib (TAGRISSO) 40 mg Tab Take 1 tablet (40 mg) by mouth once daily.     oxyCODONE-acetaminophen (PERCOCET) 2.5-325 mg per tablet Take 1 tablet by mouth every 6 (six) hours as needed for Pain.    oxyCODONE-acetaminophen (PERCOCET) 7.5-325 mg per tablet Take 1 tablet by mouth every 6 (six) hours as needed for Pain.    pantoprazole (PROTONIX) 40 MG tablet Take 1 tablet (40 mg total) by mouth once daily.    promethazine (PHENERGAN) 25 MG tablet Take 1 tablet (25 mg total) by mouth every 6 (six) hours as needed for Nausea.   Last reviewed on 1/6/2023 11:37 AM by Jackie Rae NP    Review of patient's allergies indicates:   Allergen Reactions    Heparin analogues Other (See Comments)     HIT panel pending     Last reviewed on  1/6/2023 1:42 PM by Garry Mcqueen      Tasks added this encounter   4/14/2023 - Refill Call (Auto Added)   Tasks due within next 3 months   5/21/2023 - Clinical - Follow Up Assesement (180 day)     Paula Fernandez, Patient Care Assistant  [facility]  [address]  [facility] [address]  Phone: [phone]  Fax: [phone]

## 2023-03-20 ENCOUNTER — HOSPITAL ENCOUNTER (OUTPATIENT)
Dept: RADIOLOGY | Facility: HOSPITAL | Age: 78
Discharge: HOME | End: 2023-03-20
Attending: NURSE PRACTITIONER
Payer: MEDICARE

## 2023-03-20 DIAGNOSIS — C34.92 ADENOCARCINOMA OF LEFT LUNG, STAGE 4: ICD-10-CM

## 2023-03-20 PROCEDURE — 25500020 PHARM REV CODE 255: Mod: PO | Performed by: NURSE PRACTITIONER

## 2023-03-20 PROCEDURE — 71260 CT THORAX DX C+: CPT | Mod: 26,,, | Performed by: RADIOLOGY

## 2023-03-20 PROCEDURE — 71260 CT CHEST ABDOMEN PELVIS WITH CONTRAST (XPD): ICD-10-PCS | Mod: 26,,, | Performed by: RADIOLOGY

## 2023-03-20 PROCEDURE — 71260 CT THORAX DX C+: CPT | Mod: TC,PO

## 2023-03-20 PROCEDURE — 74177 CT ABD & PELVIS W/CONTRAST: CPT | Mod: 26,,, | Performed by: RADIOLOGY

## 2023-03-20 PROCEDURE — 74177 CT ABD & PELVIS W/CONTRAST: CPT | Mod: TC,PO

## 2023-03-20 PROCEDURE — 74177 CT CHEST ABDOMEN PELVIS WITH CONTRAST (XPD): ICD-10-PCS | Mod: 26,,, | Performed by: RADIOLOGY

## 2023-03-20 RX ADMIN — IOHEXOL 100 ML: 350 INJECTION, SOLUTION INTRAVENOUS at 08:03

## 2023-03-26 PROBLEM — I73.9 PVD (PERIPHERAL VASCULAR DISEASE): Status: ACTIVE | Noted: 2023-03-26

## 2023-03-26 PROBLEM — E44.1 MILD PROTEIN-CALORIE MALNUTRITION: Status: ACTIVE | Noted: 2023-03-26
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
## 2023-03-27 ENCOUNTER — OFFICE VISIT (OUTPATIENT)
Dept: HEMATOLOGY/ONCOLOGY | Facility: CLINIC | Age: 78
End: 2023-03-27
Payer: MEDICARE

## 2023-03-27 VITALS
WEIGHT: 139.31 LBS | BODY MASS INDEX: 18.38 KG/M2 | SYSTOLIC BLOOD PRESSURE: 118 MMHG | HEART RATE: 69 BPM | DIASTOLIC BLOOD PRESSURE: 57 MMHG | RESPIRATION RATE: 16 BRPM | OXYGEN SATURATION: 98 %

## 2023-03-27 DIAGNOSIS — R11.2 CHEMOTHERAPY-INDUCED NAUSEA AND VOMITING: ICD-10-CM

## 2023-03-27 DIAGNOSIS — F02.80 DEMENTIA DUE TO HEAD TRAUMA WITHOUT BEHAVIORAL DISTURBANCE: ICD-10-CM

## 2023-03-27 DIAGNOSIS — T75.3XXA MOTION SICKNESS, INITIAL ENCOUNTER: ICD-10-CM

## 2023-03-27 DIAGNOSIS — E44.1 MILD PROTEIN-CALORIE MALNUTRITION: ICD-10-CM

## 2023-03-27 DIAGNOSIS — E03.2 DRUG-INDUCED HYPOTHYROIDISM: ICD-10-CM

## 2023-03-27 DIAGNOSIS — T45.1X5A CHEMOTHERAPY-INDUCED THROMBOCYTOPENIA: ICD-10-CM

## 2023-03-27 DIAGNOSIS — S09.90XS DEMENTIA DUE TO HEAD TRAUMA WITHOUT BEHAVIORAL DISTURBANCE: ICD-10-CM

## 2023-03-27 DIAGNOSIS — T45.1X5A CHEMOTHERAPY-INDUCED NAUSEA AND VOMITING: ICD-10-CM

## 2023-03-27 DIAGNOSIS — G89.3 CHRONIC NEOPLASM-RELATED PAIN: ICD-10-CM

## 2023-03-27 DIAGNOSIS — C34.92 ADENOCARCINOMA OF LEFT LUNG, STAGE 4: Primary | ICD-10-CM

## 2023-03-27 DIAGNOSIS — D69.59 CHEMOTHERAPY-INDUCED THROMBOCYTOPENIA: ICD-10-CM

## 2023-03-27 DIAGNOSIS — K86.1 IDIOPATHIC CHRONIC PANCREATITIS: ICD-10-CM

## 2023-03-27 DIAGNOSIS — D69.6 THROMBOCYTOPENIA, UNSPECIFIED: ICD-10-CM

## 2023-03-27 DIAGNOSIS — D84.821 IMMUNODEFICIENCY DUE TO DRUG THERAPY: ICD-10-CM

## 2023-03-27 DIAGNOSIS — H54.8 LEGALLY BLIND: ICD-10-CM

## 2023-03-27 DIAGNOSIS — I73.9 PVD (PERIPHERAL VASCULAR DISEASE): ICD-10-CM

## 2023-03-27 DIAGNOSIS — C79.31 SECONDARY MALIGNANT NEOPLASM OF BRAIN: ICD-10-CM

## 2023-03-27 DIAGNOSIS — L95.9 CUTANEOUS VASCULITIS: ICD-10-CM

## 2023-03-27 DIAGNOSIS — Z79.899 IMMUNODEFICIENCY DUE TO DRUG THERAPY: ICD-10-CM

## 2023-03-27 PROCEDURE — 3078F DIAST BP <80 MM HG: CPT | Mod: CPTII,S$GLB,, | Performed by: INTERNAL MEDICINE

## 2023-03-27 PROCEDURE — 99215 OFFICE O/P EST HI 40 MIN: CPT | Mod: S$GLB,,, | Performed by: INTERNAL MEDICINE

## 2023-03-27 PROCEDURE — 1126F PR PAIN SEVERITY QUANTIFIED, NO PAIN PRESENT: ICD-10-PCS | Mod: CPTII,S$GLB,, | Performed by: INTERNAL MEDICINE

## 2023-03-27 PROCEDURE — 1126F AMNT PAIN NOTED NONE PRSNT: CPT | Mod: CPTII,S$GLB,, | Performed by: INTERNAL MEDICINE

## 2023-03-27 PROCEDURE — 1159F PR MEDICATION LIST DOCUMENTED IN MEDICAL RECORD: ICD-10-PCS | Mod: CPTII,S$GLB,, | Performed by: INTERNAL MEDICINE

## 2023-03-27 PROCEDURE — 1101F PR PT FALLS ASSESS DOC 0-1 FALLS W/OUT INJ PAST YR: ICD-10-PCS | Mod: CPTII,S$GLB,, | Performed by: INTERNAL MEDICINE

## 2023-03-27 PROCEDURE — 3074F SYST BP LT 130 MM HG: CPT | Mod: CPTII,S$GLB,, | Performed by: INTERNAL MEDICINE

## 2023-03-27 PROCEDURE — 3074F PR MOST RECENT SYSTOLIC BLOOD PRESSURE < 130 MM HG: ICD-10-PCS | Mod: CPTII,S$GLB,, | Performed by: INTERNAL MEDICINE

## 2023-03-27 PROCEDURE — 99999 PR PBB SHADOW E&M-EST. PATIENT-LVL III: ICD-10-PCS | Mod: PBBFAC,,, | Performed by: INTERNAL MEDICINE

## 2023-03-27 PROCEDURE — 1160F RVW MEDS BY RX/DR IN RCRD: CPT | Mod: CPTII,S$GLB,, | Performed by: INTERNAL MEDICINE

## 2023-03-27 PROCEDURE — 1160F PR REVIEW ALL MEDS BY PRESCRIBER/CLIN PHARMACIST DOCUMENTED: ICD-10-PCS | Mod: CPTII,S$GLB,, | Performed by: INTERNAL MEDICINE

## 2023-03-27 PROCEDURE — 1157F ADVNC CARE PLAN IN RCRD: CPT | Mod: CPTII,S$GLB,, | Performed by: INTERNAL MEDICINE

## 2023-03-27 PROCEDURE — 1159F MED LIST DOCD IN RCRD: CPT | Mod: CPTII,S$GLB,, | Performed by: INTERNAL MEDICINE

## 2023-03-27 PROCEDURE — 99215 PR OFFICE/OUTPT VISIT, EST, LEVL V, 40-54 MIN: ICD-10-PCS | Mod: S$GLB,,, | Performed by: INTERNAL MEDICINE

## 2023-03-27 PROCEDURE — 3288F FALL RISK ASSESSMENT DOCD: CPT | Mod: CPTII,S$GLB,, | Performed by: INTERNAL MEDICINE

## 2023-03-27 PROCEDURE — 3288F PR FALLS RISK ASSESSMENT DOCUMENTED: ICD-10-PCS | Mod: CPTII,S$GLB,, | Performed by: INTERNAL MEDICINE

## 2023-03-27 PROCEDURE — 1157F PR ADVANCE CARE PLAN OR EQUIV PRESENT IN MEDICAL RECORD: ICD-10-PCS | Mod: CPTII,S$GLB,, | Performed by: INTERNAL MEDICINE

## 2023-03-27 PROCEDURE — 1101F PT FALLS ASSESS-DOCD LE1/YR: CPT | Mod: CPTII,S$GLB,, | Performed by: INTERNAL MEDICINE

## 2023-03-27 PROCEDURE — 3078F PR MOST RECENT DIASTOLIC BLOOD PRESSURE < 80 MM HG: ICD-10-PCS | Mod: CPTII,S$GLB,, | Performed by: INTERNAL MEDICINE

## 2023-03-27 PROCEDURE — 99999 PR PBB SHADOW E&M-EST. PATIENT-LVL III: CPT | Mod: PBBFAC,,, | Performed by: INTERNAL MEDICINE

## 2023-03-27 RX ORDER — HYDROCODONE BITARTRATE AND ACETAMINOPHEN 10; 325 MG/1; MG/1
1 TABLET ORAL EVERY 6 HOURS PRN
Qty: 60 TABLET | Refills: 0 | Status: SHIPPED | OUTPATIENT
Start: 2023-03-27

## 2023-03-27 RX ORDER — SCOLOPAMINE TRANSDERMAL SYSTEM 1 MG/1
1 PATCH, EXTENDED RELEASE TRANSDERMAL
Qty: 4 PATCH | Refills: 1 | Status: SHIPPED | OUTPATIENT
Start: 2023-03-27 | End: 2024-03-26

## 2023-03-27 RX ORDER — PROMETHAZINE HYDROCHLORIDE 25 MG/1
25 TABLET ORAL EVERY 6 HOURS PRN
Qty: 60 TABLET | Refills: 3 | Status: SHIPPED | OUTPATIENT
Start: 2023-03-27

## 2023-04-02 ENCOUNTER — HOSPITAL ENCOUNTER (OUTPATIENT)
Facility: HOSPITAL | Age: 78
Discharge: HOME | End: 2023-04-03
Attending: EMERGENCY MEDICINE | Admitting: INTERNAL MEDICINE
Payer: MEDICARE

## 2023-04-02 DIAGNOSIS — M79.662 PAIN AND SWELLING OF LOWER LEG, LEFT: ICD-10-CM

## 2023-04-02 DIAGNOSIS — M79.89 PAIN AND SWELLING OF LOWER LEG, LEFT: ICD-10-CM

## 2023-04-02 DIAGNOSIS — I82.412 ACUTE DEEP VEIN THROMBOSIS (DVT) OF LEFT FEMORAL VEIN: Primary | ICD-10-CM

## 2023-04-02 DIAGNOSIS — D69.6 THROMBOCYTOPENIA: ICD-10-CM

## 2023-04-02 LAB
APTT PPP: 29.1 SEC (ref 21–32)
BASOPHILS # BLD AUTO: 0.01 K/UL (ref 0–0.2)
BASOPHILS NFR BLD: 0.1 % (ref 0–1.9)
DIFFERENTIAL METHOD: ABNORMAL
EOSINOPHIL # BLD AUTO: 0.2 K/UL (ref 0–0.5)
EOSINOPHIL NFR BLD: 1.9 % (ref 0–8)
ERYTHROCYTE [DISTWIDTH] IN BLOOD BY AUTOMATED COUNT: 14.6 % (ref 11.5–14.5)
HCT VFR BLD AUTO: 37 % (ref 37–48.5)
HGB BLD-MCNC: 11.3 G/DL (ref 12–16)
IMM GRANULOCYTES # BLD AUTO: 0.03 K/UL (ref 0–0.04)
IMM GRANULOCYTES NFR BLD AUTO: 0.4 % (ref 0–0.5)
INR PPP: 1.3 (ref 0.8–1.2)
LYMPHOCYTES # BLD AUTO: 1.3 K/UL (ref 1–4.8)
LYMPHOCYTES NFR BLD: 16.9 % (ref 18–48)
MCH RBC QN AUTO: 24.8 PG (ref 27–31)
MCHC RBC AUTO-ENTMCNC: 30.5 G/DL (ref 32–36)
MCV RBC AUTO: 81 FL (ref 82–98)
MONOCYTES # BLD AUTO: 0.7 K/UL (ref 0.3–1)
MONOCYTES NFR BLD: 9.2 % (ref 4–15)
NEUTROPHILS # BLD AUTO: 5.7 K/UL (ref 1.8–7.7)
NEUTROPHILS NFR BLD: 71.5 % (ref 38–73)
NRBC BLD-RTO: 0 /100 WBC
PLATELET # BLD AUTO: 78 K/UL (ref 150–450)
PMV BLD AUTO: 10.5 FL (ref 9.2–12.9)
PROTHROMBIN TIME: 14 SEC (ref 9–12.5)
RBC # BLD AUTO: 4.56 M/UL (ref 4–5.4)
WBC # BLD AUTO: 7.94 K/UL (ref 3.9–12.7)

## 2023-04-02 PROCEDURE — 85025 COMPLETE CBC W/AUTO DIFF WBC: CPT | Mod: ER | Performed by: EMERGENCY MEDICINE

## 2023-04-02 PROCEDURE — 85730 THROMBOPLASTIN TIME PARTIAL: CPT | Mod: ER | Performed by: EMERGENCY MEDICINE

## 2023-04-02 PROCEDURE — 25000003 PHARM REV CODE 250: Mod: ER | Performed by: EMERGENCY MEDICINE

## 2023-04-02 PROCEDURE — 85610 PROTHROMBIN TIME: CPT | Mod: ER | Performed by: EMERGENCY MEDICINE

## 2023-04-02 RX ORDER — ACETAMINOPHEN 500 MG
500 TABLET ORAL EVERY 6 HOURS PRN
COMMUNITY

## 2023-04-02 RX ORDER — CAPSAICIN, MENTHOL .025; 1.25 G/100G; G/100G
1 PATCH TOPICAL DAILY PRN
COMMUNITY

## 2023-04-02 RX ORDER — DICLOFENAC SODIUM 10 MG/G
2 GEL TOPICAL DAILY PRN
COMMUNITY

## 2023-04-02 RX ADMIN — APIXABAN 10 MG: 2.5 TABLET, FILM COATED ORAL at 09:04

## 2023-04-02 NOTE — Clinical Note
Diagnosis: Acute deep vein thrombosis (DVT) of left femoral vein [[id number]]   Future Attending Provider: ISABEL ÁLVAREZ [[id number]]   Admitting Provider:: ISABEL ÁLVAREZ [[id number]]

## 2023-04-03 VITALS
DIASTOLIC BLOOD PRESSURE: 54 MMHG | BODY MASS INDEX: 18.83 KG/M2 | TEMPERATURE: 98 F | HEIGHT: 72 IN | SYSTOLIC BLOOD PRESSURE: 108 MMHG | HEART RATE: 73 BPM | OXYGEN SATURATION: 100 % | RESPIRATION RATE: 20 BRPM | WEIGHT: 139 LBS

## 2023-04-03 PROBLEM — D69.6 THROMBOCYTOPENIA, UNSPECIFIED: Status: RESOLVED | Noted: 2022-09-22 | Resolved: 2023-04-03

## 2023-04-03 PROBLEM — M15.9 GENERALIZED OSTEOARTHRITIS OF MULTIPLE SITES: Status: ACTIVE | Noted: 2023-04-03

## 2023-04-03 PROBLEM — I82.412 ACUTE DEEP VEIN THROMBOSIS (DVT) OF LEFT FEMORAL VEIN: Status: ACTIVE | Noted: 2023-04-03

## 2023-04-03 LAB
ALBUMIN SERPL BCP-MCNC: 3.3 G/DL (ref 3.5–5.2)
ALP SERPL-CCNC: 81 U/L (ref 55–135)
ALT SERPL W/O P-5'-P-CCNC: 5 U/L (ref 10–44)
ANION GAP SERPL CALC-SCNC: 8 MMOL/L (ref 8–16)
AST SERPL-CCNC: 17 U/L (ref 10–40)
BASOPHILS # BLD AUTO: 0.02 K/UL (ref 0–0.2)
BASOPHILS NFR BLD: 0.2 % (ref 0–1.9)
BILIRUB SERPL-MCNC: 0.3 MG/DL (ref 0.1–1)
BUN SERPL-MCNC: 28 MG/DL (ref 8–23)
CALCIUM SERPL-MCNC: 8.9 MG/DL (ref 8.7–10.5)
CHLORIDE SERPL-SCNC: 111 MMOL/L (ref 95–110)
CO2 SERPL-SCNC: 22 MMOL/L (ref 23–29)
CREAT SERPL-MCNC: 1.1 MG/DL (ref 0.5–1.4)
DIFFERENTIAL METHOD: ABNORMAL
EOSINOPHIL # BLD AUTO: 0.3 K/UL (ref 0–0.5)
EOSINOPHIL NFR BLD: 3.1 % (ref 0–8)
ERYTHROCYTE [DISTWIDTH] IN BLOOD BY AUTOMATED COUNT: 14.7 % (ref 11.5–14.5)
EST. GFR  (NO RACE VARIABLE): 51 ML/MIN/1.73 M^2
GLUCOSE SERPL-MCNC: 97 MG/DL (ref 70–110)
HCT VFR BLD AUTO: 33.3 % (ref 37–48.5)
HGB BLD-MCNC: 10.6 G/DL (ref 12–16)
IMM GRANULOCYTES # BLD AUTO: 0.03 K/UL (ref 0–0.04)
IMM GRANULOCYTES NFR BLD AUTO: 0.4 % (ref 0–0.5)
LYMPHOCYTES # BLD AUTO: 1.9 K/UL (ref 1–4.8)
LYMPHOCYTES NFR BLD: 23.2 % (ref 18–48)
MCH RBC QN AUTO: 25 PG (ref 27–31)
MCHC RBC AUTO-ENTMCNC: 31.8 G/DL (ref 32–36)
MCV RBC AUTO: 79 FL (ref 82–98)
MONOCYTES # BLD AUTO: 0.6 K/UL (ref 0.3–1)
MONOCYTES NFR BLD: 7.5 % (ref 4–15)
NEUTROPHILS # BLD AUTO: 5.4 K/UL (ref 1.8–7.7)
NEUTROPHILS NFR BLD: 65.6 % (ref 38–73)
NRBC BLD-RTO: 0 /100 WBC
PLATELET # BLD AUTO: 79 K/UL (ref 150–450)
PMV BLD AUTO: 11.8 FL (ref 9.2–12.9)
POTASSIUM SERPL-SCNC: 4 MMOL/L (ref 3.5–5.1)
PROT SERPL-MCNC: 6.8 G/DL (ref 6–8.4)
RBC # BLD AUTO: 4.24 M/UL (ref 4–5.4)
SODIUM SERPL-SCNC: 141 MMOL/L (ref 136–145)
WBC # BLD AUTO: 8.18 K/UL (ref 3.9–12.7)

## 2023-04-03 PROCEDURE — G0378 HOSPITAL OBSERVATION PER HR: HCPCS

## 2023-04-03 PROCEDURE — 94761 N-INVAS EAR/PLS OXIMETRY MLT: CPT

## 2023-04-03 PROCEDURE — 96372 THER/PROPH/DIAG INJ SC/IM: CPT | Performed by: STUDENT IN AN ORGANIZED HEALTH CARE EDUCATION/TRAINING PROGRAM

## 2023-04-03 PROCEDURE — 63600175 PHARM REV CODE 636 W HCPCS: Performed by: STUDENT IN AN ORGANIZED HEALTH CARE EDUCATION/TRAINING PROGRAM

## 2023-04-03 PROCEDURE — 99223 PR INITIAL HOSPITAL CARE,LEVL III: ICD-10-PCS | Mod: 95,,, | Performed by: INTERNAL MEDICINE

## 2023-04-03 PROCEDURE — 80053 COMPREHEN METABOLIC PANEL: CPT | Performed by: STUDENT IN AN ORGANIZED HEALTH CARE EDUCATION/TRAINING PROGRAM

## 2023-04-03 PROCEDURE — 99223 1ST HOSP IP/OBS HIGH 75: CPT | Mod: 95,,, | Performed by: INTERNAL MEDICINE

## 2023-04-03 PROCEDURE — 99284 EMERGENCY DEPT VISIT MOD MDM: CPT | Mod: 25

## 2023-04-03 PROCEDURE — 85025 COMPLETE CBC W/AUTO DIFF WBC: CPT | Performed by: STUDENT IN AN ORGANIZED HEALTH CARE EDUCATION/TRAINING PROGRAM

## 2023-04-03 PROCEDURE — 25000003 PHARM REV CODE 250: Performed by: STUDENT IN AN ORGANIZED HEALTH CARE EDUCATION/TRAINING PROGRAM

## 2023-04-03 RX ORDER — HYDROCODONE BITARTRATE AND ACETAMINOPHEN 5; 325 MG/1; MG/1
1 TABLET ORAL EVERY 8 HOURS PRN
Status: DISCONTINUED | OUTPATIENT
Start: 2023-04-03 | End: 2023-04-03 | Stop reason: HOSPADM

## 2023-04-03 RX ORDER — POLYETHYLENE GLYCOL 3350 17 G/17G
17 POWDER, FOR SOLUTION ORAL DAILY PRN
Status: DISCONTINUED | OUTPATIENT
Start: 2023-04-03 | End: 2023-04-03 | Stop reason: HOSPADM

## 2023-04-03 RX ORDER — ENOXAPARIN SODIUM 100 MG/ML
1 INJECTION SUBCUTANEOUS
Status: DISCONTINUED | OUTPATIENT
Start: 2023-04-03 | End: 2023-04-03

## 2023-04-03 RX ORDER — ACETAMINOPHEN 325 MG/1
650 TABLET ORAL EVERY 6 HOURS PRN
Status: DISCONTINUED | OUTPATIENT
Start: 2023-04-03 | End: 2023-04-03 | Stop reason: HOSPADM

## 2023-04-03 RX ORDER — DICLOFENAC SODIUM 10 MG/G
2 GEL TOPICAL 3 TIMES DAILY PRN
Status: DISCONTINUED | OUTPATIENT
Start: 2023-04-03 | End: 2023-04-03 | Stop reason: HOSPADM

## 2023-04-03 RX ORDER — SODIUM CHLORIDE 0.9 % (FLUSH) 0.9 %
10 SYRINGE (ML) INJECTION
Status: DISCONTINUED | OUTPATIENT
Start: 2023-04-03 | End: 2023-04-03 | Stop reason: HOSPADM

## 2023-04-03 RX ORDER — TALC
6 POWDER (GRAM) TOPICAL NIGHTLY PRN
Status: DISCONTINUED | OUTPATIENT
Start: 2023-04-03 | End: 2023-04-03 | Stop reason: HOSPADM

## 2023-04-03 RX ADMIN — ENOXAPARIN SODIUM 60 MG: 30 INJECTION SUBCUTANEOUS at 02:04

## 2023-04-03 RX ADMIN — APIXABAN 10 MG: 5 TABLET, FILM COATED ORAL at 10:04

## 2023-04-03 NOTE — HPI
77 year-old female with non-small cell lung cancer on osimertinib was admitted on 4/2/23 for acute deep vein thrombosis of left lower extremity. Consult is for anticoagulation recommendations.

## 2023-04-03 NOTE — ASSESSMENT & PLAN NOTE
- my patient in outpatient setting  - CT chest/abdomen/pelvis (3/20/23) revealed stable disease  - continue osimertinib.

## 2023-04-03 NOTE — ED NOTES
Pt being transferred to [facility] at this time. Verbalizes understanding for admission and agrees to plan of care. No further questions or concerns upon transport.

## 2023-04-03 NOTE — ASSESSMENT & PLAN NOTE
- diagnosed on ultrasound from 4/2/23.  - risk factors include malignancy, relatively immobility/venous stasis  - she can be placed on apixaban for management of her deep vein thrombosis. In the setting of advanced cancer, she will need lifelong anticoagulation.  - from my standpoint, she can be discharged as long as she receives the prescription for apixaban.
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
## 2023-04-03 NOTE — PHARMACY MED REC
"Admission Medication History     The home medication history was taken by Justine Goldberg CPhT.    Medication history obtained from, Patient's Daughter Verified    You may go to "Admission" then "Reconcile Home Medications" tabs to review and/or act upon these items.     The home medication list has been updated by the Pharmacy department.   Please read ALL comments highlighted in yellow.   Please address this information as you see fit.    Feel free to contact us if you have any questions or require assistance.      The medications listed below were removed from the home medication list.  Please reorder if appropriate:  Patient reports no longer taking the following medication(s):  Percocet 2.5-325 mg and 7.5-325 mg  Protonix 40 mg        Justine Goldberg CPhT.  Ext [phone]               .        "

## 2023-04-03 NOTE — ED NOTES
Pt arrived to [facility] Ed, no complaints at this time, vss.  Updated on POC.  Bed low and in locked position, side rails up x2, family at bedside.  Will continue to monitor.,
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
## 2023-04-03 NOTE — ED NOTES
Pt resting in room, respirations even and unlabored, NAD noted, vss, pt updated on plan of care.  Pt on continuous cm and pulse ox.  Side rails up x2, bed locked and in low position, call light within reach, family at bedside.  Will continue to monitor.
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
## 2023-04-10 ENCOUNTER — OFFICE VISIT (OUTPATIENT)
Dept: HEMATOLOGY/ONCOLOGY | Facility: CLINIC | Age: 78
End: 2023-04-10
Payer: MEDICARE

## 2023-04-10 VITALS — HEART RATE: 74 BPM | OXYGEN SATURATION: 100 % | DIASTOLIC BLOOD PRESSURE: 55 MMHG | SYSTOLIC BLOOD PRESSURE: 105 MMHG

## 2023-04-10 DIAGNOSIS — R53.81 PHYSICAL DECONDITIONING: ICD-10-CM

## 2023-04-10 DIAGNOSIS — E44.1 MILD PROTEIN-CALORIE MALNUTRITION: ICD-10-CM

## 2023-04-10 DIAGNOSIS — C79.31 SECONDARY MALIGNANT NEOPLASM OF BRAIN: ICD-10-CM

## 2023-04-10 DIAGNOSIS — H54.8 LEGALLY BLIND: ICD-10-CM

## 2023-04-10 DIAGNOSIS — T45.1X5A CHEMOTHERAPY-INDUCED THROMBOCYTOPENIA: ICD-10-CM

## 2023-04-10 DIAGNOSIS — C34.92 ADENOCARCINOMA OF LEFT LUNG, STAGE 4: Primary | ICD-10-CM

## 2023-04-10 DIAGNOSIS — S09.90XS DEMENTIA DUE TO HEAD TRAUMA WITHOUT BEHAVIORAL DISTURBANCE: ICD-10-CM

## 2023-04-10 DIAGNOSIS — E03.2 DRUG-INDUCED HYPOTHYROIDISM: ICD-10-CM

## 2023-04-10 DIAGNOSIS — L95.9 CUTANEOUS VASCULITIS: ICD-10-CM

## 2023-04-10 DIAGNOSIS — F02.80 DEMENTIA DUE TO HEAD TRAUMA WITHOUT BEHAVIORAL DISTURBANCE: ICD-10-CM

## 2023-04-10 DIAGNOSIS — T45.1X5A CHEMOTHERAPY-INDUCED NAUSEA AND VOMITING: ICD-10-CM

## 2023-04-10 DIAGNOSIS — D69.59 CHEMOTHERAPY-INDUCED THROMBOCYTOPENIA: ICD-10-CM

## 2023-04-10 DIAGNOSIS — I82.412 ACUTE DEEP VEIN THROMBOSIS (DVT) OF LEFT FEMORAL VEIN: ICD-10-CM

## 2023-04-10 DIAGNOSIS — K86.1 IDIOPATHIC CHRONIC PANCREATITIS: ICD-10-CM

## 2023-04-10 DIAGNOSIS — I73.9 PVD (PERIPHERAL VASCULAR DISEASE): ICD-10-CM

## 2023-04-10 DIAGNOSIS — R11.2 CHEMOTHERAPY-INDUCED NAUSEA AND VOMITING: ICD-10-CM

## 2023-04-10 DIAGNOSIS — G89.3 CHRONIC NEOPLASM-RELATED PAIN: ICD-10-CM

## 2023-04-10 PROCEDURE — 1101F PT FALLS ASSESS-DOCD LE1/YR: CPT | Mod: CPTII,S$GLB,, | Performed by: INTERNAL MEDICINE

## 2023-04-10 PROCEDURE — 99999 PR PBB SHADOW E&M-EST. PATIENT-LVL III: ICD-10-PCS | Mod: PBBFAC,,, | Performed by: INTERNAL MEDICINE

## 2023-04-10 PROCEDURE — 1157F ADVNC CARE PLAN IN RCRD: CPT | Mod: CPTII,S$GLB,, | Performed by: INTERNAL MEDICINE

## 2023-04-10 PROCEDURE — 3288F PR FALLS RISK ASSESSMENT DOCUMENTED: ICD-10-PCS | Mod: CPTII,S$GLB,, | Performed by: INTERNAL MEDICINE

## 2023-04-10 PROCEDURE — 3288F FALL RISK ASSESSMENT DOCD: CPT | Mod: CPTII,S$GLB,, | Performed by: INTERNAL MEDICINE

## 2023-04-10 PROCEDURE — 3078F DIAST BP <80 MM HG: CPT | Mod: CPTII,S$GLB,, | Performed by: INTERNAL MEDICINE

## 2023-04-10 PROCEDURE — 99215 OFFICE O/P EST HI 40 MIN: CPT | Mod: S$GLB,,, | Performed by: INTERNAL MEDICINE

## 2023-04-10 PROCEDURE — 3074F SYST BP LT 130 MM HG: CPT | Mod: CPTII,S$GLB,, | Performed by: INTERNAL MEDICINE

## 2023-04-10 PROCEDURE — 1101F PR PT FALLS ASSESS DOC 0-1 FALLS W/OUT INJ PAST YR: ICD-10-PCS | Mod: CPTII,S$GLB,, | Performed by: INTERNAL MEDICINE

## 2023-04-10 PROCEDURE — 1159F MED LIST DOCD IN RCRD: CPT | Mod: CPTII,S$GLB,, | Performed by: INTERNAL MEDICINE

## 2023-04-10 PROCEDURE — 1160F RVW MEDS BY RX/DR IN RCRD: CPT | Mod: CPTII,S$GLB,, | Performed by: INTERNAL MEDICINE

## 2023-04-10 PROCEDURE — 1160F PR REVIEW ALL MEDS BY PRESCRIBER/CLIN PHARMACIST DOCUMENTED: ICD-10-PCS | Mod: CPTII,S$GLB,, | Performed by: INTERNAL MEDICINE

## 2023-04-10 PROCEDURE — 1125F AMNT PAIN NOTED PAIN PRSNT: CPT | Mod: CPTII,S$GLB,, | Performed by: INTERNAL MEDICINE

## 2023-04-10 PROCEDURE — 1157F PR ADVANCE CARE PLAN OR EQUIV PRESENT IN MEDICAL RECORD: ICD-10-PCS | Mod: CPTII,S$GLB,, | Performed by: INTERNAL MEDICINE

## 2023-04-10 PROCEDURE — 99215 PR OFFICE/OUTPT VISIT, EST, LEVL V, 40-54 MIN: ICD-10-PCS | Mod: S$GLB,,, | Performed by: INTERNAL MEDICINE

## 2023-04-10 PROCEDURE — 3074F PR MOST RECENT SYSTOLIC BLOOD PRESSURE < 130 MM HG: ICD-10-PCS | Mod: CPTII,S$GLB,, | Performed by: INTERNAL MEDICINE

## 2023-04-10 PROCEDURE — 99999 PR PBB SHADOW E&M-EST. PATIENT-LVL III: CPT | Mod: PBBFAC,,, | Performed by: INTERNAL MEDICINE

## 2023-04-10 PROCEDURE — 3078F PR MOST RECENT DIASTOLIC BLOOD PRESSURE < 80 MM HG: ICD-10-PCS | Mod: CPTII,S$GLB,, | Performed by: INTERNAL MEDICINE

## 2023-04-10 PROCEDURE — 1159F PR MEDICATION LIST DOCUMENTED IN MEDICAL RECORD: ICD-10-PCS | Mod: CPTII,S$GLB,, | Performed by: INTERNAL MEDICINE

## 2023-04-10 PROCEDURE — 1125F PR PAIN SEVERITY QUANTIFIED, PAIN PRESENT: ICD-10-PCS | Mod: CPTII,S$GLB,, | Performed by: INTERNAL MEDICINE

## 2023-04-10 NOTE — Clinical Note
Good afternoon,  She is asking about a wider wheelchair (she has a very narrow I've never seen before). I placed a DME order. Can you assist in getting her a wheelchair?  Thanks!

## 2023-04-13 DIAGNOSIS — C34.92 PRIMARY LUNG ADENOCARCINOMA, LEFT: ICD-10-CM

## 2023-04-13 DIAGNOSIS — R53.81 PHYSICAL DECONDITIONING: Primary | ICD-10-CM

## 2023-04-14 ENCOUNTER — SPECIALTY PHARMACY (OUTPATIENT)
Dept: PHARMACY | Facility: CLINIC | Age: 78
End: 2023-04-14
Payer: MEDICARE

## 2023-04-14 ENCOUNTER — PATIENT MESSAGE (OUTPATIENT)
Dept: PHARMACY | Facility: CLINIC | Age: 78
End: 2023-04-14
Payer: MEDICARE

## 2023-04-14 NOTE — TELEPHONE ENCOUNTER
Incoming call from patient's daughter. Not sure how much Tagrisso patient has on hand. She knows she at least has enough to get through next week. Will Call [facility] back on Monday with quantity on hand count.

## 2023-04-17 NOTE — TELEPHONE ENCOUNTER
Specialty Pharmacy - Refill Coordination    Specialty Medication Orders Linked to Encounter      Flowsheet Row Most Recent Value   Medication #1 osimertinib (TAGRISSO) 40 mg Tab (Order#[id number], Rx#[id number])     Reviewed recent ER visit- pt diagnosed with DVT.  Heme onc aware and prescribed the Eliquis. Appropriate to proceed with Tagrisso refill.   Reviewed minor Cat B DDI with Tagrisso + Eliquis-  P-glycoprotein/ABCB1 Inhibitors may increase the serum concentration of Apixaban. Significance of interaction is much greater if pt is also on CY inhibitor.  Pt aware to monitor for s/s of bleeding and immediately report bleeding while on Eliquis.        Refill Questions - Documented Responses      Flowsheet Row Most Recent Value   Patient Availability and HIPAA Verification    Does patient want to proceed with activity? Yes   HIPAA/medical authority confirmed? Yes   Relationship to patient of person spoken to? Self   Refill Screening Questions    Changes to allergies? No   Changes to medications? Yes  [Eliquis]   New conditions since last clinic visit? Yes  [DVT]   Unplanned office visit, urgent care, ED, or hospital admission in the last 4 weeks? Yes  [DVT]   How does patient/caregiver feel medication is working? Good   Financial problems or insurance changes? No   How many doses of your specialty medications were missed in the last 4 weeks? 0   Would patient like to speak to a pharmacist? Yes   When does the patient need to receive the medication? 23   Refill Delivery Questions    How will the patient receive the medication? MEDRx   When does the patient need to receive the medication? 23   Address in [facility] confirmed and updated if neccessary? Yes   Expected Copay ($) 0   Is the patient able to afford the medication copay? Yes   Payment Method zero copay   Days supply of Refill 30   Supplies needed? No supplies needed   Refill activity completed? Yes   Refill activity plan Refill  scheduled   Shipment/Pickup Date: 04/18/23            Current Outpatient Medications   Medication Sig    acetaminophen (TYLENOL) 500 MG tablet Take 500 mg by mouth every 6 (six) hours as needed for Pain.    apixaban (ELIQUIS) 5 mg (74 tabs) DsPk Take 2 tablets (10 mg total) by mouth 2 (two) times daily for 7 days, THEN 1 tablet (5 mg total) 2 (two) times daily for 23 days.    capsaicin-menthol (SALONPAS, CAPSAICIN-MENTHOL,) 0.025-1.25 % PtMd Apply 1 patch topically daily as needed.    diclofenac sodium (VOLTAREN) 1 % Gel Apply 2 g topically daily as needed.    HYDROcodone-acetaminophen (NORCO)  mg per tablet Take 1 tablet by mouth every 6 (six) hours as needed (chronic pain). Take 1/2 tablet by mouth in the morning and 1/2 tablet by mouth in the evening as needed    osimertinib (TAGRISSO) 40 mg Tab Take 1 tablet (40 mg) by mouth once daily.    promethazine (PHENERGAN) 25 MG tablet Take 1 tablet (25 mg total) by mouth every 6 (six) hours as needed for Nausea.    scopolamine (TRANSDERM-SCOP) 1.3-1.5 mg (1 mg over 3 days) Place 1 patch onto the skin every 72 hours.   Last reviewed on 4/10/2023  3:25 PM by Garry Mcqueen MD    Review of patient's allergies indicates:  No Known Allergies Last reviewed on  4/10/2023 3:25 PM by Garry Mcqueen      Tasks added this encounter   5/16/2023 - Refill Call (Auto Added)   Tasks due within next 3 months   5/21/2023 - Clinical - Follow Up Assesement (180 day)     Britni Burnett, PharmD  [facility]  [address] [place] [id number]  Phone: [phone]  Fax: [phone]

## 2023-05-01 DIAGNOSIS — I82.412 ACUTE DEEP VEIN THROMBOSIS (DVT) OF LEFT FEMORAL VEIN: Primary | ICD-10-CM

## 2023-05-02 PROBLEM — M79.89 PAIN AND SWELLING OF LOWER LEG, LEFT: Status: ACTIVE | Noted: 2023-05-02

## 2023-05-02 PROBLEM — D69.6 THROMBOCYTOPENIA: Status: ACTIVE | Noted: 2023-05-02

## 2023-05-02 PROBLEM — M79.662 PAIN AND SWELLING OF LOWER LEG, LEFT: Status: ACTIVE | Noted: 2023-05-02

## 2023-05-09 ENCOUNTER — PATIENT MESSAGE (OUTPATIENT)
Dept: RESEARCH | Facility: HOSPITAL | Age: 78
End: 2023-05-09
Payer: MEDICARE

## 2023-05-16 ENCOUNTER — PATIENT MESSAGE (OUTPATIENT)
Dept: RESEARCH | Facility: HOSPITAL | Age: 78
End: 2023-05-16
Payer: MEDICARE

## 2023-05-16 ENCOUNTER — PATIENT MESSAGE (OUTPATIENT)
Dept: PHARMACY | Facility: CLINIC | Age: 78
End: 2023-05-16
Payer: MEDICARE

## 2023-05-17 ENCOUNTER — HOSPITAL ENCOUNTER (INPATIENT)
Facility: HOSPITAL | Age: 78
LOS: 2 days | Discharge: HOME | DRG: 281 | End: 2023-05-20
Attending: EMERGENCY MEDICINE | Admitting: INTERNAL MEDICINE
Payer: MEDICARE

## 2023-05-17 ENCOUNTER — SPECIALTY PHARMACY (OUTPATIENT)
Dept: PHARMACY | Facility: CLINIC | Age: 78
End: 2023-05-17
Payer: MEDICARE

## 2023-05-17 DIAGNOSIS — C79.31 LUNG CANCER METASTATIC TO BRAIN: ICD-10-CM

## 2023-05-17 DIAGNOSIS — Z86.718 HISTORY OF DVT OF LOWER EXTREMITY: ICD-10-CM

## 2023-05-17 DIAGNOSIS — Z79.01 ANTICOAGULATED: ICD-10-CM

## 2023-05-17 DIAGNOSIS — I21.4 NSTEMI (NON-ST ELEVATED MYOCARDIAL INFARCTION): Primary | ICD-10-CM

## 2023-05-17 DIAGNOSIS — R07.2 PRECORDIAL PAIN: ICD-10-CM

## 2023-05-17 DIAGNOSIS — C34.92 ADENOCARCINOMA OF LEFT LUNG, STAGE 4: ICD-10-CM

## 2023-05-17 DIAGNOSIS — D69.6 THROMBOCYTOPENIA: ICD-10-CM

## 2023-05-17 DIAGNOSIS — D50.9 MICROCYTIC ANEMIA: ICD-10-CM

## 2023-05-17 DIAGNOSIS — C34.90 LUNG CANCER METASTATIC TO BRAIN: ICD-10-CM

## 2023-05-17 DIAGNOSIS — I82.412 ACUTE DEEP VEIN THROMBOSIS (DVT) OF LEFT FEMORAL VEIN: ICD-10-CM

## 2023-05-17 DIAGNOSIS — I73.9 PVD (PERIPHERAL VASCULAR DISEASE): ICD-10-CM

## 2023-05-17 LAB
ALBUMIN SERPL BCP-MCNC: 4.3 G/DL (ref 3.5–5.2)
ALP SERPL-CCNC: 95 U/L (ref 38–126)
ALT SERPL W/O P-5'-P-CCNC: 14 U/L (ref 10–44)
ANION GAP SERPL CALC-SCNC: 9 MMOL/L (ref 8–16)
AST SERPL-CCNC: 57 U/L (ref 15–46)
BASOPHILS # BLD AUTO: 0.03 K/UL (ref 0–0.2)
BASOPHILS NFR BLD: 0.5 % (ref 0–1.9)
BILIRUB SERPL-MCNC: 0.6 MG/DL (ref 0.1–1)
CALCIUM SERPL-MCNC: 9.6 MG/DL (ref 8.7–10.5)
CHLORIDE SERPL-SCNC: 110 MMOL/L (ref 95–110)
CO2 SERPL-SCNC: 22 MMOL/L (ref 23–29)
CREAT SERPL-MCNC: 0.98 MG/DL (ref 0.5–1.4)
DIFFERENTIAL METHOD: ABNORMAL
EOSINOPHIL # BLD AUTO: 0 K/UL (ref 0–0.5)
EOSINOPHIL NFR BLD: 0.6 % (ref 0–8)
ERYTHROCYTE [DISTWIDTH] IN BLOOD BY AUTOMATED COUNT: 15.3 % (ref 11.5–14.5)
EST. GFR  (NO RACE VARIABLE): 59.1 ML/MIN/1.73 M^2
GLUCOSE SERPL-MCNC: 108 MG/DL (ref 70–110)
HCT VFR BLD AUTO: 37.7 % (ref 37–48.5)
HGB BLD-MCNC: 11.7 G/DL (ref 12–16)
IMM GRANULOCYTES # BLD AUTO: 0.01 K/UL (ref 0–0.04)
IMM GRANULOCYTES NFR BLD AUTO: 0.2 % (ref 0–0.5)
LIPASE SERPL-CCNC: 26 U/L (ref 23–300)
LYMPHOCYTES # BLD AUTO: 1.1 K/UL (ref 1–4.8)
LYMPHOCYTES NFR BLD: 17.3 % (ref 18–48)
MAGNESIUM SERPL-MCNC: 2.2 MG/DL (ref 1.6–2.6)
MCH RBC QN AUTO: 24.6 PG (ref 27–31)
MCHC RBC AUTO-ENTMCNC: 31 G/DL (ref 32–36)
MCV RBC AUTO: 79 FL (ref 82–98)
MONOCYTES # BLD AUTO: 0.4 K/UL (ref 0.3–1)
MONOCYTES NFR BLD: 6.9 % (ref 4–15)
NEUTROPHILS # BLD AUTO: 4.8 K/UL (ref 1.8–7.7)
NEUTROPHILS NFR BLD: 74.5 % (ref 38–73)
NRBC BLD-RTO: 0 /100 WBC
PLATELET # BLD AUTO: 45 K/UL (ref 150–450)
PMV BLD AUTO: ABNORMAL FL (ref 9.2–12.9)
POTASSIUM SERPL-SCNC: 4.3 MMOL/L (ref 3.5–5.1)
PROT SERPL-MCNC: 7.7 G/DL (ref 6–8.4)
RBC # BLD AUTO: 4.76 M/UL (ref 4–5.4)
SODIUM SERPL-SCNC: 141 MMOL/L (ref 136–145)
TROPONIN I SERPL-MCNC: 4.61 NG/ML (ref 0.01–0.03)
TROPONIN I SERPL-MCNC: 4.79 NG/ML (ref 0.01–0.03)
UUN UR-MCNC: 17 MG/DL (ref 7–17)
WBC # BLD AUTO: 6.4 K/UL (ref 3.9–12.7)

## 2023-05-17 PROCEDURE — 25500020 PHARM REV CODE 255: Mod: ER | Performed by: EMERGENCY MEDICINE

## 2023-05-17 PROCEDURE — 84484 ASSAY OF TROPONIN QUANT: CPT | Mod: 91,ER | Performed by: EMERGENCY MEDICINE

## 2023-05-17 PROCEDURE — 99285 EMERGENCY DEPT VISIT HI MDM: CPT | Mod: 25

## 2023-05-17 PROCEDURE — 25000003 PHARM REV CODE 250: Mod: ER | Performed by: EMERGENCY MEDICINE

## 2023-05-17 PROCEDURE — G0378 HOSPITAL OBSERVATION PER HR: HCPCS

## 2023-05-17 PROCEDURE — 93010 ELECTROCARDIOGRAM REPORT: CPT | Mod: ,,, | Performed by: INTERNAL MEDICINE

## 2023-05-17 PROCEDURE — 83735 ASSAY OF MAGNESIUM: CPT | Mod: ER | Performed by: EMERGENCY MEDICINE

## 2023-05-17 PROCEDURE — 83690 ASSAY OF LIPASE: CPT | Mod: ER | Performed by: EMERGENCY MEDICINE

## 2023-05-17 PROCEDURE — 84484 ASSAY OF TROPONIN QUANT: CPT | Mod: ER | Performed by: EMERGENCY MEDICINE

## 2023-05-17 PROCEDURE — 99900035 HC TECH TIME PER 15 MIN (STAT): Mod: ER

## 2023-05-17 PROCEDURE — 93010 EKG 12-LEAD: ICD-10-PCS | Mod: ,,, | Performed by: INTERNAL MEDICINE

## 2023-05-17 PROCEDURE — 93005 ELECTROCARDIOGRAM TRACING: CPT | Mod: ER

## 2023-05-17 PROCEDURE — 85025 COMPLETE CBC W/AUTO DIFF WBC: CPT | Mod: ER | Performed by: EMERGENCY MEDICINE

## 2023-05-17 PROCEDURE — 80053 COMPREHEN METABOLIC PANEL: CPT | Mod: ER | Performed by: EMERGENCY MEDICINE

## 2023-05-17 RX ORDER — NAPROXEN SODIUM 220 MG/1
324 TABLET, FILM COATED ORAL
Status: COMPLETED | OUTPATIENT
Start: 2023-05-17 | End: 2023-05-17

## 2023-05-17 RX ORDER — ACETAMINOPHEN 325 MG/1
650 TABLET ORAL EVERY 6 HOURS PRN
Status: DISCONTINUED | OUTPATIENT
Start: 2023-05-18 | End: 2023-05-20 | Stop reason: HOSPADM

## 2023-05-17 RX ORDER — PROMETHAZINE HYDROCHLORIDE 12.5 MG/1
25 TABLET ORAL EVERY 6 HOURS PRN
Status: DISCONTINUED | OUTPATIENT
Start: 2023-05-18 | End: 2023-05-20 | Stop reason: HOSPADM

## 2023-05-17 RX ORDER — NAPROXEN SODIUM 220 MG/1
81 TABLET, FILM COATED ORAL DAILY
Status: DISCONTINUED | OUTPATIENT
Start: 2023-05-18 | End: 2023-05-19

## 2023-05-17 RX ORDER — HYDROCODONE BITARTRATE AND ACETAMINOPHEN 7.5; 325 MG/15ML; MG/15ML
15 SOLUTION ORAL EVERY 6 HOURS PRN
Status: DISCONTINUED | OUTPATIENT
Start: 2023-05-18 | End: 2023-05-17

## 2023-05-17 RX ORDER — HYDROCODONE BITARTRATE AND ACETAMINOPHEN 5; 325 MG/1; MG/1
1 TABLET ORAL EVERY 6 HOURS PRN
Status: DISCONTINUED | OUTPATIENT
Start: 2023-05-18 | End: 2023-05-20 | Stop reason: HOSPADM

## 2023-05-17 RX ORDER — METOPROLOL TARTRATE 25 MG/1
25 TABLET, FILM COATED ORAL 2 TIMES DAILY
Status: DISCONTINUED | OUTPATIENT
Start: 2023-05-17 | End: 2023-05-19

## 2023-05-17 RX ORDER — HEPARIN SODIUM,PORCINE/D5W 25000/250
0-40 INTRAVENOUS SOLUTION INTRAVENOUS CONTINUOUS
Status: DISCONTINUED | OUTPATIENT
Start: 2023-05-17 | End: 2023-05-17

## 2023-05-17 RX ORDER — CLOPIDOGREL BISULFATE 75 MG/1
300 TABLET ORAL
Status: COMPLETED | OUTPATIENT
Start: 2023-05-17 | End: 2023-05-17

## 2023-05-17 RX ORDER — CLOPIDOGREL BISULFATE 75 MG/1
75 TABLET ORAL DAILY
Status: DISCONTINUED | OUTPATIENT
Start: 2023-05-18 | End: 2023-05-20 | Stop reason: HOSPADM

## 2023-05-17 RX ORDER — ATORVASTATIN CALCIUM 20 MG/1
20 TABLET, FILM COATED ORAL DAILY
Status: DISCONTINUED | OUTPATIENT
Start: 2023-05-17 | End: 2023-05-20 | Stop reason: HOSPADM

## 2023-05-17 RX ADMIN — IOHEXOL 100 ML: 350 INJECTION, SOLUTION INTRAVENOUS at 07:05

## 2023-05-17 RX ADMIN — CLOPIDOGREL BISULFATE 300 MG: 75 TABLET ORAL at 07:05

## 2023-05-17 RX ADMIN — ASPIRIN 324 MG: 81 TABLET ORAL at 07:05
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
## 2023-05-17 NOTE — Clinical Note
Diagnosis: NSTEMI (non-ST elevated myocardial infarction) [858814]   Future Attending Provider: BABATUNDE CHAN [[id number]]   Admitting Provider:: BABATUNDE CHAN [[id number]]

## 2023-05-17 NOTE — ED NOTES
Pt arrives with family member with complaints of left arm pain, left chest wall pain and vomiting that has been present for multiple days now. Pt denies any known injuries and denies any falls. Pt denies any dyspnea. Normal respiratory drive noted.        Pain:  Rated 6/10.     Psychosocial:  Patient is calm and cooperative.  Patients insight and judgement are appropriate to situation.  Appears clean, well maintained, with clothing appropriate to environment.  No evidence of delusions, hallucinations, or psychosis.     Neuro:  Eyes open spontaneously.  Awake, alert, oriented x 4.  Speech clear and appropriate.  Tolerating saliva secretions well.  Able to follow commands, demonstrating ability to actively and appropriately communicate within context of current conversation.  Symmetrical facial muscles.  Moving all extremities well with no noted weakness.  Adequate muscle tone present.    Movement is purposeful.       Airway:  Bilateral chest rise and fall.  RR regular and non-labored.         Circulatory:  Skin warm, dry, and pink.  Radial pulses strong and regular.  Capillary refill/skin blanching less than 3 seconds to distal of upper extremities.     Abdomen:  No related complaint.      Urinary:  No related complaints.

## 2023-05-17 NOTE — TELEPHONE ENCOUNTER
Specialty Pharmacy - Refill Coordination    Specialty Medication Orders Linked to Encounter      Flowsheet Row Most Recent Value   Medication #1 osimertinib (TAGRISSO) 40 mg Tab (Order#[id number], Rx#[id number])            Refill Questions - Documented Responses      Flowsheet Row Most Recent Value   Patient Availability and HIPAA Verification    Does patient want to proceed with activity? Yes   HIPAA/medical authority confirmed? Yes   Relationship to patient of person spoken to? Child   Refill Screening Questions    Changes to allergies? No   Changes to medications? No   New conditions since last clinic visit? No   Unplanned office visit, urgent care, ED, or hospital admission in the last 4 weeks? No   How does patient/caregiver feel medication is working? Good   Financial problems or insurance changes? No   How many doses of your specialty medications were missed in the last 4 weeks? 0   Would patient like to speak to a pharmacist? No   When does the patient need to receive the medication? 05/23/23   Refill Delivery Questions    How will the patient receive the medication? MEDRx   When does the patient need to receive the medication? 05/23/23   Shipping Address Home   Address in [facility] confirmed and updated if neccessary? Yes   Expected Copay ($) 0   Is the patient able to afford the medication copay? Yes   Payment Method zero copay   Days supply of Refill 30   Supplies needed? No supplies needed   Refill activity completed? Yes   Refill activity plan Refill scheduled   Shipment/Pickup Date: 05/18/23            Current Outpatient Medications   Medication Sig    acetaminophen (TYLENOL) 500 MG tablet Take 500 mg by mouth every 6 (six) hours as needed for Pain.    apixaban (ELIQUIS) 5 mg Tab Take 1 tablet (5 mg total) by mouth 2 (two) times daily.    capsaicin-menthol (SALONPAS, CAPSAICIN-MENTHOL,) 0.025-1.25 % PtMd Apply 1 patch topically daily as needed.    diclofenac sodium (VOLTAREN) 1 % Gel Apply 2 g  topically daily as needed.    HYDROcodone-acetaminophen (NORCO)  mg per tablet Take 1 tablet by mouth every 6 (six) hours as needed (chronic pain). Take 1/2 tablet by mouth in the morning and 1/2 tablet by mouth in the evening as needed    osimertinib (TAGRISSO) 40 mg Tab Take 1 tablet (40 mg) by mouth once daily.    promethazine (PHENERGAN) 25 MG tablet Take 1 tablet (25 mg total) by mouth every 6 (six) hours as needed for Nausea.    scopolamine (TRANSDERM-SCOP) 1.3-1.5 mg (1 mg over 3 days) Place 1 patch onto the skin every 72 hours.   Last reviewed on 4/10/2023  3:25 PM by Garry Mcqueen MD    Review of patient's allergies indicates:  No Known Allergies Last reviewed on  5/1/2023 1:47 PM by Garry Mcqueen      Tasks added this encounter   No tasks added.   Tasks due within next 3 months   5/19/2023 - Refill Coordination Outreach (1 time occurrence)  5/21/2023 - Clinical Assessment (6 month recurrence)     Harpal Bergman, PharmD  [facility]  [address] [place] [id number]  Phone: [phone]  Fax: [phone]

## 2023-05-18 PROBLEM — I21.4 NSTEMI (NON-ST ELEVATED MYOCARDIAL INFARCTION): Status: ACTIVE | Noted: 2023-05-18

## 2023-05-18 LAB
ALBUMIN SERPL BCP-MCNC: 3.6 G/DL (ref 3.5–5.2)
ALP SERPL-CCNC: 86 U/L (ref 55–135)
ALT SERPL W/O P-5'-P-CCNC: 9 U/L (ref 10–44)
ANION GAP SERPL CALC-SCNC: 9 MMOL/L (ref 8–16)
APTT PPP: 32.6 SEC (ref 21–32)
AST SERPL-CCNC: 34 U/L (ref 10–40)
AV INDEX (PROSTH): 0.8
AV MEAN GRADIENT: 4 MMHG
AV PEAK GRADIENT: 10 MMHG
AV VALVE AREA: 2.02 CM2
AV VELOCITY RATIO: 0.72
BACTERIA #/AREA URNS HPF: ABNORMAL /HPF
BASOPHILS # BLD AUTO: 0.03 K/UL (ref 0–0.2)
BASOPHILS # BLD AUTO: 0.04 K/UL (ref 0–0.2)
BASOPHILS NFR BLD: 0.5 % (ref 0–1.9)
BASOPHILS NFR BLD: 0.6 % (ref 0–1.9)
BILIRUB SERPL-MCNC: 0.5 MG/DL (ref 0.1–1)
BILIRUB UR QL STRIP: NEGATIVE
BNP SERPL-MCNC: 110 PG/ML (ref 0–99)
BSA FOR ECHO PROCEDURE: 1.78 M2
BUN SERPL-MCNC: 14 MG/DL (ref 8–23)
CALCIUM SERPL-MCNC: 9.8 MG/DL (ref 8.7–10.5)
CHLORIDE SERPL-SCNC: 108 MMOL/L (ref 95–110)
CHOLEST SERPL-MCNC: 267 MG/DL (ref 120–199)
CHOLEST/HDLC SERPL: 5.3 {RATIO} (ref 2–5)
CLARITY UR: CLEAR
CO2 SERPL-SCNC: 21 MMOL/L (ref 23–29)
COLOR UR: YELLOW
CREAT SERPL-MCNC: 0.9 MG/DL (ref 0.5–1.4)
CV ECHO LV RWT: 0.37 CM
DIFFERENTIAL METHOD: ABNORMAL
DIFFERENTIAL METHOD: ABNORMAL
DOP CALC AO PEAK VEL: 1.6 M/S
DOP CALC AO VTI: 25.5 CM
DOP CALC LVOT AREA: 2.5 CM2
DOP CALC LVOT DIAMETER: 1.79 CM
DOP CALC LVOT PEAK VEL: 1.15 M/S
DOP CALC LVOT STROKE VOLUME: 51.56 CM3
DOP CALC MV VTI: 29.6 CM
DOP CALCLVOT PEAK VEL VTI: 20.5 CM
E WAVE DECELERATION TIME: 195.63 MSEC
E/A RATIO: 0.94
E/E' RATIO: 10.53 M/S
ECHO LV POSTERIOR WALL: 0.91 CM (ref 0.6–1.1)
EJECTION FRACTION: 55 %
EOSINOPHIL # BLD AUTO: 0.1 K/UL (ref 0–0.5)
EOSINOPHIL # BLD AUTO: 0.1 K/UL (ref 0–0.5)
EOSINOPHIL NFR BLD: 1.8 % (ref 0–8)
EOSINOPHIL NFR BLD: 2.2 % (ref 0–8)
ERYTHROCYTE [DISTWIDTH] IN BLOOD BY AUTOMATED COUNT: 15.6 % (ref 11.5–14.5)
ERYTHROCYTE [DISTWIDTH] IN BLOOD BY AUTOMATED COUNT: 15.6 % (ref 11.5–14.5)
EST. GFR  (NO RACE VARIABLE): >60 ML/MIN/1.73 M^2
ESTIMATED AVG GLUCOSE: 88 MG/DL (ref 68–131)
FERRITIN SERPL-MCNC: 315 NG/ML (ref 20–300)
FRACTIONAL SHORTENING: 38 % (ref 28–44)
GLUCOSE SERPL-MCNC: 94 MG/DL (ref 70–110)
GLUCOSE UR QL STRIP: NEGATIVE
HBA1C MFR BLD: 4.7 % (ref 4–5.6)
HCT VFR BLD AUTO: 38 % (ref 37–48.5)
HCT VFR BLD AUTO: 38.2 % (ref 37–48.5)
HDLC SERPL-MCNC: 50 MG/DL (ref 40–75)
HDLC SERPL: 18.7 % (ref 20–50)
HGB BLD-MCNC: 11.8 G/DL (ref 12–16)
HGB BLD-MCNC: 11.9 G/DL (ref 12–16)
HGB UR QL STRIP: ABNORMAL
HYALINE CASTS #/AREA URNS LPF: 0 /LPF
IMM GRANULOCYTES # BLD AUTO: 0.02 K/UL (ref 0–0.04)
IMM GRANULOCYTES # BLD AUTO: 0.02 K/UL (ref 0–0.04)
IMM GRANULOCYTES NFR BLD AUTO: 0.3 % (ref 0–0.5)
IMM GRANULOCYTES NFR BLD AUTO: 0.3 % (ref 0–0.5)
INR PPP: 1.3 (ref 0.8–1.2)
INTERVENTRICULAR SEPTUM: 0.97 CM (ref 0.6–1.1)
IRON SERPL-MCNC: 35 UG/DL (ref 30–160)
IVC DIAMETER: 1.65 CM
KETONES UR QL STRIP: NEGATIVE
LA MAJOR: 5.11 CM
LA WIDTH: 3.43 CM
LDLC SERPL CALC-MCNC: 196.8 MG/DL (ref 63–159)
LEFT ATRIUM SIZE: 3.27 CM
LEFT INTERNAL DIMENSION IN SYSTOLE: 3.04 CM (ref 2.1–4)
LEFT VENTRICLE DIASTOLIC VOLUME INDEX: 61.59 ML/M2
LEFT VENTRICLE DIASTOLIC VOLUME: 112.09 ML
LEFT VENTRICLE MASS INDEX: 89 G/M2
LEFT VENTRICLE SYSTOLIC VOLUME INDEX: 19.9 ML/M2
LEFT VENTRICLE SYSTOLIC VOLUME: 36.18 ML
LEFT VENTRICULAR INTERNAL DIMENSION IN DIASTOLE: 4.89 CM (ref 3.5–6)
LEFT VENTRICULAR MASS: 161.47 G
LEUKOCYTE ESTERASE UR QL STRIP: ABNORMAL
LV LATERAL E/E' RATIO: 9.88 M/S
LV SEPTAL E/E' RATIO: 11.29 M/S
LVOT MG: 1.95 MMHG
LVOT MV: 0.62 CM/S
LYMPHOCYTES # BLD AUTO: 1.5 K/UL (ref 1–4.8)
LYMPHOCYTES # BLD AUTO: 1.6 K/UL (ref 1–4.8)
LYMPHOCYTES NFR BLD: 22.2 % (ref 18–48)
LYMPHOCYTES NFR BLD: 27 % (ref 18–48)
MAGNESIUM SERPL-MCNC: 2.4 MG/DL (ref 1.6–2.6)
MCH RBC QN AUTO: 24.6 PG (ref 27–31)
MCH RBC QN AUTO: 24.6 PG (ref 27–31)
MCHC RBC AUTO-ENTMCNC: 31.1 G/DL (ref 32–36)
MCHC RBC AUTO-ENTMCNC: 31.2 G/DL (ref 32–36)
MCV RBC AUTO: 79 FL (ref 82–98)
MCV RBC AUTO: 79 FL (ref 82–98)
MICROSCOPIC COMMENT: ABNORMAL
MONOCYTES # BLD AUTO: 0.4 K/UL (ref 0.3–1)
MONOCYTES # BLD AUTO: 0.5 K/UL (ref 0.3–1)
MONOCYTES NFR BLD: 7.5 % (ref 4–15)
MONOCYTES NFR BLD: 7.6 % (ref 4–15)
MV MEAN GRADIENT: 2 MMHG
MV PEAK A VEL: 0.84 M/S
MV PEAK E VEL: 0.79 M/S
MV PEAK GRADIENT: 4 MMHG
MV STENOSIS PRESSURE HALF TIME: 64.14 MS
MV VALVE AREA BY CONTINUITY EQUATION: 1.74 CM2
MV VALVE AREA P 1/2 METHOD: 3.43 CM2
NEUTROPHILS # BLD AUTO: 3.6 K/UL (ref 1.8–7.7)
NEUTROPHILS # BLD AUTO: 4.5 K/UL (ref 1.8–7.7)
NEUTROPHILS NFR BLD: 62.4 % (ref 38–73)
NEUTROPHILS NFR BLD: 67.6 % (ref 38–73)
NITRITE UR QL STRIP: NEGATIVE
NONHDLC SERPL-MCNC: 217 MG/DL
NRBC BLD-RTO: 0 /100 WBC
NRBC BLD-RTO: 0 /100 WBC
OHS LV EJECTION FRACTION SIMPSONS BIPLANE MOD: 7 %
PH UR STRIP: 6 [PH] (ref 5–8)
PHOSPHATE SERPL-MCNC: 3.6 MG/DL (ref 2.7–4.5)
PISA MRMAX VEL: 4.9 M/S
PISA TR MAX VEL: 2.37 M/S
PLATELET # BLD AUTO: 49 K/UL (ref 150–450)
PLATELET # BLD AUTO: 50 K/UL (ref 150–450)
PMV BLD AUTO: ABNORMAL FL (ref 9.2–12.9)
PMV BLD AUTO: ABNORMAL FL (ref 9.2–12.9)
POTASSIUM SERPL-SCNC: 4.5 MMOL/L (ref 3.5–5.1)
PROT SERPL-MCNC: 7.5 G/DL (ref 6–8.4)
PROT UR QL STRIP: ABNORMAL
PROTHROMBIN TIME: 13.7 SEC (ref 9–12.5)
PULM VEIN S/D RATIO: 1.44
PV PEAK D VEL: 0.36 M/S
PV PEAK S VEL: 0.52 M/S
RA MAJOR: 4.62 CM
RA PRESSURE: 3 MMHG
RBC # BLD AUTO: 4.79 M/UL (ref 4–5.4)
RBC # BLD AUTO: 4.84 M/UL (ref 4–5.4)
RBC #/AREA URNS HPF: 8 /HPF (ref 0–4)
RV TISSUE DOPPLER FREE WALL SYSTOLIC VELOCITY 1 (APICAL 4 CHAMBER VIEW): 0.02 CM/S
SATURATED IRON: 12 % (ref 20–50)
SODIUM SERPL-SCNC: 138 MMOL/L (ref 136–145)
SP GR UR STRIP: >1.03 (ref 1–1.03)
TDI LATERAL: 0.08 M/S
TDI SEPTAL: 0.07 M/S
TDI: 0.08 M/S
TOTAL IRON BINDING CAPACITY: 297 UG/DL (ref 250–450)
TR MAX PG: 22 MMHG
TRANSFERRIN SERPL-MCNC: 201 MG/DL (ref 200–375)
TRIGL SERPL-MCNC: 101 MG/DL (ref 30–150)
TROPONIN I SERPL DL<=0.01 NG/ML-MCNC: 5.39 NG/ML (ref 0–0.03)
TSH SERPL DL<=0.005 MIU/L-ACNC: 1.45 UIU/ML (ref 0.4–4)
TV REST PULMONARY ARTERY PRESSURE: 25 MMHG
URN SPEC COLLECT METH UR: ABNORMAL
UROBILINOGEN UR STRIP-ACNC: NEGATIVE EU/DL
WBC # BLD AUTO: 5.82 K/UL (ref 3.9–12.7)
WBC # BLD AUTO: 6.67 K/UL (ref 3.9–12.7)
WBC #/AREA URNS HPF: 5 /HPF (ref 0–5)

## 2023-05-18 PROCEDURE — 99223 PR INITIAL HOSPITAL CARE,LEVL III: ICD-10-PCS | Mod: ,,, | Performed by: INTERNAL MEDICINE

## 2023-05-18 PROCEDURE — 36415 COLL VENOUS BLD VENIPUNCTURE: CPT | Performed by: NURSE PRACTITIONER

## 2023-05-18 PROCEDURE — 99223 PR INITIAL HOSPITAL CARE,LEVL III: ICD-10-PCS | Mod: 25,,, | Performed by: NURSE PRACTITIONER

## 2023-05-18 PROCEDURE — 85025 COMPLETE CBC W/AUTO DIFF WBC: CPT | Performed by: STUDENT IN AN ORGANIZED HEALTH CARE EDUCATION/TRAINING PROGRAM

## 2023-05-18 PROCEDURE — 83880 ASSAY OF NATRIURETIC PEPTIDE: CPT | Performed by: STUDENT IN AN ORGANIZED HEALTH CARE EDUCATION/TRAINING PROGRAM

## 2023-05-18 PROCEDURE — 83735 ASSAY OF MAGNESIUM: CPT | Performed by: STUDENT IN AN ORGANIZED HEALTH CARE EDUCATION/TRAINING PROGRAM

## 2023-05-18 PROCEDURE — 84466 ASSAY OF TRANSFERRIN: CPT | Performed by: STUDENT IN AN ORGANIZED HEALTH CARE EDUCATION/TRAINING PROGRAM

## 2023-05-18 PROCEDURE — 93010 ELECTROCARDIOGRAM REPORT: CPT | Mod: ,,, | Performed by: INTERNAL MEDICINE

## 2023-05-18 PROCEDURE — 93010 EKG 12-LEAD: ICD-10-PCS | Mod: ,,, | Performed by: INTERNAL MEDICINE

## 2023-05-18 PROCEDURE — 25000003 PHARM REV CODE 250: Performed by: STUDENT IN AN ORGANIZED HEALTH CARE EDUCATION/TRAINING PROGRAM

## 2023-05-18 PROCEDURE — 93005 ELECTROCARDIOGRAM TRACING: CPT

## 2023-05-18 PROCEDURE — 84100 ASSAY OF PHOSPHORUS: CPT | Performed by: STUDENT IN AN ORGANIZED HEALTH CARE EDUCATION/TRAINING PROGRAM

## 2023-05-18 PROCEDURE — 63600175 PHARM REV CODE 636 W HCPCS: Performed by: STUDENT IN AN ORGANIZED HEALTH CARE EDUCATION/TRAINING PROGRAM

## 2023-05-18 PROCEDURE — 84443 ASSAY THYROID STIM HORMONE: CPT | Performed by: STUDENT IN AN ORGANIZED HEALTH CARE EDUCATION/TRAINING PROGRAM

## 2023-05-18 PROCEDURE — 11000001 HC ACUTE MED/SURG PRIVATE ROOM

## 2023-05-18 PROCEDURE — 99223 1ST HOSP IP/OBS HIGH 75: CPT | Mod: 25,,, | Performed by: NURSE PRACTITIONER

## 2023-05-18 PROCEDURE — 84484 ASSAY OF TROPONIN QUANT: CPT | Performed by: NURSE PRACTITIONER

## 2023-05-18 PROCEDURE — 85025 COMPLETE CBC W/AUTO DIFF WBC: CPT | Mod: 91 | Performed by: STUDENT IN AN ORGANIZED HEALTH CARE EDUCATION/TRAINING PROGRAM

## 2023-05-18 PROCEDURE — 85610 PROTHROMBIN TIME: CPT | Performed by: STUDENT IN AN ORGANIZED HEALTH CARE EDUCATION/TRAINING PROGRAM

## 2023-05-18 PROCEDURE — 82728 ASSAY OF FERRITIN: CPT | Performed by: STUDENT IN AN ORGANIZED HEALTH CARE EDUCATION/TRAINING PROGRAM

## 2023-05-18 PROCEDURE — 36415 COLL VENOUS BLD VENIPUNCTURE: CPT | Performed by: STUDENT IN AN ORGANIZED HEALTH CARE EDUCATION/TRAINING PROGRAM

## 2023-05-18 PROCEDURE — 99223 1ST HOSP IP/OBS HIGH 75: CPT | Mod: ,,, | Performed by: INTERNAL MEDICINE

## 2023-05-18 PROCEDURE — 85730 THROMBOPLASTIN TIME PARTIAL: CPT | Performed by: STUDENT IN AN ORGANIZED HEALTH CARE EDUCATION/TRAINING PROGRAM

## 2023-05-18 PROCEDURE — 80053 COMPREHEN METABOLIC PANEL: CPT | Performed by: STUDENT IN AN ORGANIZED HEALTH CARE EDUCATION/TRAINING PROGRAM

## 2023-05-18 PROCEDURE — 81000 URINALYSIS NONAUTO W/SCOPE: CPT | Performed by: STUDENT IN AN ORGANIZED HEALTH CARE EDUCATION/TRAINING PROGRAM

## 2023-05-18 PROCEDURE — 83036 HEMOGLOBIN GLYCOSYLATED A1C: CPT | Performed by: STUDENT IN AN ORGANIZED HEALTH CARE EDUCATION/TRAINING PROGRAM

## 2023-05-18 PROCEDURE — 80061 LIPID PANEL: CPT | Performed by: STUDENT IN AN ORGANIZED HEALTH CARE EDUCATION/TRAINING PROGRAM

## 2023-05-18 RX ORDER — HEPARIN SODIUM,PORCINE/D5W 25000/250
0-40 INTRAVENOUS SOLUTION INTRAVENOUS CONTINUOUS
Status: DISCONTINUED | OUTPATIENT
Start: 2023-05-18 | End: 2023-05-20 | Stop reason: HOSPADM

## 2023-05-18 RX ADMIN — ATORVASTATIN CALCIUM 20 MG: 20 TABLET, FILM COATED ORAL at 11:05

## 2023-05-18 RX ADMIN — METOPROLOL TARTRATE 25 MG: 25 TABLET, FILM COATED ORAL at 11:05

## 2023-05-18 RX ADMIN — HYDROCODONE BITARTRATE AND ACETAMINOPHEN 1 TABLET: 5; 325 TABLET ORAL at 12:05

## 2023-05-18 RX ADMIN — HEPARIN SODIUM 12 UNITS/KG/HR: 10000 INJECTION, SOLUTION INTRAVENOUS at 02:05

## 2023-05-18 RX ADMIN — METOPROLOL TARTRATE 25 MG: 25 TABLET, FILM COATED ORAL at 10:05

## 2023-05-18 RX ADMIN — ASPIRIN 81 MG CHEWABLE TABLET 81 MG: 81 TABLET CHEWABLE at 11:05

## 2023-05-18 RX ADMIN — ATORVASTATIN CALCIUM 20 MG: 20 TABLET, FILM COATED ORAL at 12:05

## 2023-05-18 RX ADMIN — METOPROLOL TARTRATE 25 MG: 25 TABLET, FILM COATED ORAL at 12:05

## 2023-05-18 RX ADMIN — CLOPIDOGREL BISULFATE 75 MG: 75 TABLET ORAL at 11:05

## 2023-05-18 NOTE — PLAN OF CARE
05/18/23 0418   Admission   Initial VN Admission Questions Complete   Communication Issues? None   Shift   Virtual Nurse - Rounding Complete   Pain Management Interventions quiet environment facilitated;relaxation techniques promoted   Virtual Nurse - Patient Verbalized Approval Of VN Rounding;Camera Use   Type of Frequent Check   Type Patient Rounds;Telemetry Monitoring   Safety/Activity   Patient Rounds bed in low position;bed wheels locked;placement of personal items at bedside;call light in patient/parent reach;clutter free environment maintained;ID band on;visualized patient   Safety Promotion/Fall Prevention assistive device/personal item within reach;bed alarm set;room near unit station;nonskid shoes/socks when out of bed;side rails raised x 2;medications reviewed;Fall Risk reviewed with patient/family   Safety Precautions emergency equipment at bedside   Activity Management Ambulated -L4   Positioning   Body Position position changed independently   Head of Bed (HOB) Positioning HOB elevated   Positioning/Transfer Devices pillows;in use    VN cued into room to complete admit assessment. VIP model introduced; VN working alongside bedside treatment team.  Plan of care reviewed with patient and daughter. Patient informed of fall risk, fall precautions, call light within reach, side rails x2 elevated. Patient notified to ask staff for assistance. Patient verbalized complete understanding. Time allowed for questions. Will continue to monitor and intervene as needed.

## 2023-05-18 NOTE — HPI
77 year-old female with non-small cell lung cancer on osimertinib was admitted on 5/17/23 for probably NSTEMI. Consult is for lung cancer, thrombocytopenia.

## 2023-05-18 NOTE — ASSESSMENT & PLAN NOTE
Left CFV, POP, GSV DVT diagnosed in 04/2023  Resume home Eliquis   Will need lifelong OAC per Oncology
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
## 2023-05-18 NOTE — ED NOTES
Notified by family member that they just gave pt her nightly dose of Apixaban. MD aware. Pt in stable condition in no distress at this time.

## 2023-05-18 NOTE — PROGRESS NOTES
"[facility] IM Team A Resident HO-4 Progress Note    Subjective:     Patient reports she slept well. Her pain did not recur overnight. She has an appetite. She is making urine.      Objective:   Last 24 Hour Vital Signs:  BP  Min: 105/54  Max: 150/63  Temp  Av.8 °F (36.6 °C)  Min: 97.3 °F (36.3 °C)  Max: 98.2 °F (36.8 °C)  Pulse  Av.8  Min: 58  Max: 77  Resp  Av.7  Min: 18  Max: 21  SpO2  Av.3 %  Min: 97 %  Max: 99 %  Height  Av' 1" (185.4 cm)  Min: 6' 1" (185.4 cm)  Max: 6' 1" (185.4 cm)  Weight  Av.3 kg (135 lb 0.5 oz)  Min: 61.3 kg (135 lb 0.5 oz)  Max: 61.3 kg (135 lb 0.5 oz)  No intake/output data recorded.    Physical Examination:  Physical Exam  Constitutional:       Appearance: Normal appearance.   HENT:      Head: Normocephalic and atraumatic.      Right Ear: Tympanic membrane normal.      Left Ear: Tympanic membrane normal.      Nose: Nose normal.      Mouth/Throat:      Pharynx: No oropharyngeal exudate.   Eyes:      Extraocular Movements: Extraocular movements intact.      Pupils: Pupils are equal, round, and reactive to light.   Cardiovascular:      Rate and Rhythm: Normal rate and regular rhythm.      Comments: 2+ DP pulses in bilateral lower extrmeities  Pulmonary:      Effort: Pulmonary effort is normal.      Breath sounds: Normal breath sounds.      Comments: Faint crackles noted at left base. Able to speak in full sentences  Abdominal:      General: Abdomen is flat.      Palpations: Abdomen is soft.   Musculoskeletal:         General: No tenderness. Normal range of motion.      Cervical back: Normal range of motion and neck supple.   Skin:     General: Skin is warm and dry.   Neurological:      General: No focal deficit present.      Mental Status: She is alert and oriented to person, place, and time.   Psychiatric:         Mood and Affect: Mood normal.         Behavior: Behavior normal.     Laboratory:  Laboratory Data Reviewed: yes  Pertinent Findings:  CBC: " 6.67/11.8(79)/49  BMP: 138/4.5/108/21/14/0.9/94    Chol: 267  LDL: 197  Trop: 4.7-->4.6  TSH: 1.25  A1c: p/d  BNP: p/d    Microbiology Data Reviewed: yes  Pertinent Findings:  None    Other Results:  EKG (my interpretation):   5/17: NSR W75/NA/GRWP/CHERYL/TWI in V1-V3  5/18: same except TWI have resolved. Now has diffuse TW flattening    Radiology Data Reviewed: yes  Pertinent Findings:  CXR: left sided reticular opacities    CTPE: left sided mass with surrounding inflammation. Hilar LA. No PE.     Current Medications:     Infusions:       Scheduled:   aspirin  81 mg Oral Daily    atorvastatin  20 mg Oral Daily    clopidogreL  75 mg Oral Daily    metoprolol tartrate  25 mg Oral BID        PRN:  acetaminophen, HYDROcodone-acetaminophen, promethazine    Antibiotics and Day Number of Therapy:  None    Lines and Day Number of Therapy:  PIV 5/17-px    Assessment:   77 year old with history of NSC lung cancer, stage IV, PAD, DVT, pancreaittis presents with nausea, vomiting, and left arm/shoulder pain and found to have NSTEMI with trop peaking at 4.7. Also has worsening thrombocytopenia so heprin held. Gave DAPT and admitted to medicine.      Plan:   NSTEMI  -Concern for type 1 NSTEMI given high troponin and shortness of breath/shoulder pain  -Took eliquis tonight at 6pm. Also thrombocytopenic with level <50 so holding off on anticoagulation for now  - x1--> 81 QD tomorrow  -Plavix 325 x1-->75 QD tomorrow  -Cardiology consulted, will see in am   -Troponin peaked, EKG without additional ischemic changes so will stop checking  -Start lopressor 12.5 BID   -Start lipitor 10 (interactoin between lipitor and chemo, so lower dose ordered)  -TTE in am   -Hba1c, TSH and lipid panel in am for risk stratification  -Cardiac monitoring while inpatient. K>4, Mg>2     Stage IV Adenocacinoma of Left Lung  Left Lung Inflammation  Possible metastasis to L1  -No cough, fever, shortness of breath to suggest pneumonia  -Hold chemo for now  as possible adverse events is cardiomyopathy and thrombocytopenia  -Inpatient consult to heme/onc for recs regarding chemotherapy              -Discuss with them progression of disease to include possible vertebral met  -Low threshold to start antibiotics for CAP. Given chemotherapy with immunomodulatory agent do not think that procalcitonin would provide benefit here.      History of Left Leg DVT  -Hold Eliquis while platelet <50  -Trend CBC, if Plt >50 then restart Eliquis. 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  -Keep family at bedside as much as possible  -delirium precautions     Chemotherapy induced thrombocytopenia  -Heme/onc consulted  -CBC QD     Microcytic Anemia  -Ferritin, B12, folate, iron profile in the am      CKD Stage I  -Avoid nephrotoxic agents     Hyperlipidemia  -Start lipitor 10 QD    History of acute pancreatitis  -Monitor     Lines: PIV  Diet: NPO pending possible procedure  Consults: Cardiology, heme/onc     Dispo: pending further evaluation of thrombocytopenia    Giovany Soto  [facility] Internal Medicine HO-4  [facility] IM Service Team A    [facility] Medicine Hospitalist Pager numbers:   [facility] Hospitalist Medicine Team A (Danuta/Yvonne): [phone]  [facility] Hospitalist Medicine Team B (Matthew/Hussein):  [phone]

## 2023-05-18 NOTE — ASSESSMENT & PLAN NOTE
- my patient in outpatient setting  - on osimertinib for EGFR-mutated non-small cell lung cancer  - currently admitted for possible NSTEMI.  - osimertinib's cardiovascular adverse effects are relatively rare, with cardiomyopathy estimated at <3%. QT prolongation is most common cardiovascular side effect estimated at <10%. I don't suspect the osimertinib being the main reason for her current presentation but it is a possibility.  - defer management of acute cardiovascular issues to cardiology.  - will arrange for follow-up appointment in next few weeks.
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
## 2023-05-18 NOTE — PLAN OF CARE
AURA met with pt and pt's drt Fidelina [phone] at bedside this AM to complete DCA. Pt reported that at time of d/c she will return home with her drt Fidelina. Pt stated that she does not use any HME and that she is independent in her ADL's. SW will request f/u appts. White board updated with CM name and contact information.  Discharge brochure provided.  Pt encouraged to call with any questions or concerns.  Cm will continue to follow pt through transitions of care and assist with any discharge needs.    BASIM Wilde  [phone]    Future Appointments   Date Time Provider Department Center   7/17/2023  8:50 AM LAB, [facility] LAB [facility]   7/24/2023  1:00 PM Garry Mcqueen MD [facility] HEM ONC [facility]        05/18/23 1012   Discharge Planning   Assessment Type Discharge Planning Assessment   Support Systems Family members;Children   Equipment Currently Used at Home none   Current Living Arrangements home   Care Facility Name home   Patient/Family Anticipates Transition to home;home with family   Patient/Family Anticipated Services at Transition none   DME Needed Upon Discharge  none   Discharge Plan A Home with family

## 2023-05-18 NOTE — ASSESSMENT & PLAN NOTE
- thrombocytopenia is a common side effect osimertinib. However, her platelet count is lower than her baseline.  - I suspect the acute decrease is due to acute conditions. I suspect her platelet count will increase back to her baseline ( k/uL) in time.

## 2023-05-18 NOTE — HPI
Nathan Simpson is a 77 year old with Stage 4 non-small cell lung cancer, chronic pancreatitis, dementia, blindness, DVT of LLE, thrombocytopenia.  Patient presented to the ED with chest pain with radiation down her left arm with  NV x 1 day. Pain began at rest and persisted until she received NTG in the ED. Patient's daughter arrived home to find her mother crying due to pain. Patient denies diaphoresis, palpitations, SOB, dizziness. Home Eliquis on hold- last dose 1800 on 05/17/2023. Patient currently on DAPT with asa and Plavix. EKG SR. Troponin up to 5.3 this AM. Plt 49K. Cardiology consulted for NSTEMI.

## 2023-05-18 NOTE — ED PROVIDER NOTES
ED Provider Note - 5/17/2023    The patient was received from the off-going emergency room physician Dr BARAK Cabello at 6:00PM.  All pertinent details presently available from the patient encounter were discussed including a 2 day history of waxing and waning chest pain.         Initial Vitals [05/17/23 1736]   BP Pulse Resp Temp SpO2   (!) 121/57 77 19 97.9 °F (36.6 °C) 98 %      MAP       --         Vitals:    05/17/23 1736 05/17/23 1831 05/17/23 1836 05/17/23 1901   BP: (!) 121/57 (!) 128/58     Pulse: 77 75     Resp: 19  20 (!) 21   Temp: 97.9 °F (36.6 °C)      SpO2: 98% 97%     Weight:        05/17/23 1902 05/17/23 1914 05/17/23 2002 05/17/23 2101   BP: 134/63  (!) 125/58 (!) 150/63   Pulse: 72  69 70   Resp:       Temp:       SpO2: 98%  99% 99%   Weight:  61.3 kg (135 lb 0.5 oz)           ED Course   Critical Care    Date/Time: 5/17/2023 9:34 PM  Performed by: Rafy Alonzo MD  Authorized by: Vanessa Tipton MD   Total critical care time (exclusive of procedural time) : 0 minutes  Critical care time was exclusive of separately billable procedures and treating other patients.  Critical care was necessary to treat or prevent imminent or life-threatening deterioration of the following conditions: circulatory failure.  Critical care was time spent personally by me on the following activities: ordering and review of radiographic studies, ordering and review of laboratory studies, ordering and performing treatments and interventions, evaluation of patient's response to treatment, examination of patient, re-evaluation of patient's condition, pulse oximetry, discussions with consultants and development of treatment plan with patient or surrogate.                       MDM  Differential Diagnoses   Based on available history, the working differential diagnoses considered during this evaluation include but are not limited to acute coronary syndrome, pulmonary embolus, esophageal perforation, aortic dissection,  pneumonia, and pneumothorax as well as musculoskeletal sources including chest wall strain and costochondritis.      LABS     Labs Reviewed   CBC W/ AUTO DIFFERENTIAL - Abnormal; Notable for the following components:       Result Value    Hemoglobin 11.7 (*)     MCV 79 (*)     MCH 24.6 (*)     MCHC 31.0 (*)     RDW 15.3 (*)     Platelets 45 (*)     Gran % 74.5 (*)     Lymph % 17.3 (*)     All other components within normal limits   COMPREHENSIVE METABOLIC PANEL - Abnormal; Notable for the following components:    CO2 22 (*)     AST 57 (*)     eGFR 59.1 (*)     All other components within normal limits   TROPONIN I - Abnormal; Notable for the following components:    Troponin I 4.790 (*)     All other components within normal limits   LIPASE   MAGNESIUM     All available results from the labs ordered were independently reviewed.  CBC notable for marked thrombocytopenia, CMP notable for mild AST elevation, Lipase RESULT : normal, Troponin RESULT : elevated significantly to 4.790, and Magnesium normal     Imaging     Imaging Results               CTA Chest Non-Coronary (PE Studies) (Final result)  Result time 05/17/23 20:02:18      Final result by Zac Fitzpatrick MD (05/17/23 20:02:18)                   Impression:      No pulmonary embolism.    Left lung base mass similar to the prior.  Metastatic adenopathy in the sharad and mediastinum.  Possible early osseous metastatic disease at L1.    Inflammatory change and airway wall thickening in the left lung base about the area of mass possibly reactive, infectious, or inflammatory.    This report was flagged in Epic as abnormal.    All CT scans at this facility are performed  using dose modulation techniques as appropriate to performed exam including the following:  automated exposure control; adjustment of mA and/or kV according to the patients size (this includes techniques or standardized protocols for targeted exams where dose is matched to indication/reason for exam:  i.e. extremities or head);  iterative reconstruction technique.      Electronically signed by: Zac Fitzpatrick  Date:    05/17/2023  Time:    20:02               Narrative:    EXAMINATION:  CTA CHEST NON CORONARY (PE STUDIES)    CLINICAL HISTORY:  Pulmonary embolism (PE) suspected, high prob;    TECHNIQUE:  Low dose axial images, sagittal and coronal reformations were obtained from the thoracic inlet to the lung bases following the IV administration of 100 mL of Omnipaque 350.  Contrast timing was optimized to evaluate the pulmonary arteries.  MIP images were performed.    COMPARISON:  Multiple priors    FINDINGS:  Base of Neck: No significant abnormality.    Thoracic soft tissues: Unremarkable.    Aorta: Left-sided aortic arch.  No aneurysm and no significant atherosclerosis    Heart: Normal size. No effusion.    Pulmonary vasculature: Pulmonary arteries are fairly well opacified.  No pulmonary embolism.    Kendra/Mediastinum: Hilar and mediastinal tess enlargement consistent with metastatic adenopathy.    Airways: Left basilar distal wall thickening concerning for airways infection or inflammation.    Lungs/Pleura: Moderate emphysema.  Left lung base mass similar to the prior on the order of 2.8 x 2.6 cm.  Surrounding inflammatory changes.    Esophagus: Unremarkable.    Upper Abdomen: No abnormality of the partially imaged upper abdomen.    Bones: No acute fracture.  Suspected early osseous metastatic disease at L1.                                        X-Ray Chest AP Portable (Final result)  Result time 05/17/23 18:05:26      Final result by Zac Fitzpatrick MD (05/17/23 18:05:26)                   Impression:      Subtle left sided early opacity suspected possibly atelectasis or early infection or aspiration.    This report was flagged in Epic as abnormal.      Electronically signed by: Zac Fitzpatrick  Date:    05/17/2023  Time:    18:05               Narrative:    EXAMINATION:  XR CHEST AP PORTABLE    CLINICAL  HISTORY:  chest pain;    TECHNIQUE:  Single frontal view of the chest was performed.    COMPARISON:  Multiple priors.    FINDINGS:  Subtle left sided early opacity suspected possibly atelectasis or early infection or aspiration.  Otherwise the lungs are clear, with normal appearance of pulmonary vasculature and no pleural effusion or pneumothorax.    The cardiac silhouette is normal in size. The hilar and mediastinal contours are unremarkable.    Bones are intact.                                         EKG   EKG Readings: (Independently Interpreted)   Initial Reading: No STEMI. Rhythm: Normal Sinus Rhythm. Heart Rate: 75. Ectopy: No Ectopy. Conduction: Normal. Axis: Normal.     ED Management/Discussion     Medications   aspirin chewable tablet 324 mg (324 mg Oral Given 5/17/23 1900)   clopidogreL tablet 300 mg (300 mg Oral Given 5/17/23 1909)   iohexoL (OMNIPAQUE 350) injection 100 mL (100 mLs Intravenous Given 5/17/23 1941)            ED Course as of 05/17/23 2128   Wed May 17, 2023   1858 Pertinent details of the encounter were discussed with the on-call cardiologist Dr. Celaya.  He recommends administration of aspirin and Plavix as well as initiation of a heparin drip as the patient's last dose of Eliquis was this a.m..  He notes that he will see the patient in consultation. [ST]   1919 Second contact made to the on-call cardiologist to discuss our concerns regarding thrombocytopenia.  He agrees with plan to continue aspirin and Plavix but of course asks that we not start the heparin infusion. [ST]   2106 Additional family now present at bedside.  Reiterated plan for hospitalization and cardiology consultation noting again that we will forego anticoagulation secondary to the significant thrombocytopenia.  It was at this time, however, that the patient's family informed us that they had just given her the evening Eliquis and chemotherapy from her home supply.  We have counseled them to avoid further self  administration of medications while in the department and hospitalization without 1st consulting staff to avoid potential harm and they communicate understanding [ST]      ED Course User Index  [ST] Rafy Alonzo MD         The patient's list of active medical problems, social history, medications, and allergies as documented per RN staff has been reviewed.             All available findings were reviewed with the patient/family in detail along with the indications for hospitalization in order to receive  ongoing cardiac monitoring, serial troponin levels, and cardiology consultation .  Management of the patient's suspected NSTEMI is markedly complicated by her history of metastatic cancer as well as the thrombocytopenia previously felt to be the result of her chemotherapy.    All remaining questions and concerns were addressed at this time and the patient/family communicates understanding and agrees to proceed accordingly.  Similarly, all pertinent details of the encounter were discussed with Dr Tipton via resident Dr Richey who agrees to receive the patient at [facility] for further care as outlined above.  The patient will be transferred by EMS secondary to a need for ongoing cardiac monitoring en route.        Referrals:  No orders of the defined types were placed in this encounter.      CLINICAL IMPRESSION    ICD-10-CM ICD-9-CM   1. NSTEMI (non-ST elevated myocardial infarction)  I21.4 410.70   2. Precordial pain  R07.2 786.51   3. Lung cancer metastatic to brain  C34.90 162.9    C79.31 198.3   4. Anticoagulated  Z79.01 V58.61   5. History of DVT of lower extremity  Z86.718 V12.51          ED Disposition Condition    Observation Stable               Rafy Alonzo MD  05/17/23 2528       Rafy Alonzo MD  05/17/23 2019

## 2023-05-18 NOTE — ASSESSMENT & PLAN NOTE
Symptoms concerning for ischemic etiology   Troponin up to 5- Continue to trend until peak  EKG SR  Patient CP free at this time  Once cleared by oncology- start heparin gtt x 48 hours. Ok to DC asa once heparin is initiated. Plt 49K this AM  Continue Plavix, BB, statin  TTE pending   Will medically manage given comorbidities- Discussed with patient and daughter

## 2023-05-18 NOTE — ED NOTES
Provided pts daughter w/ recliner, warm blankets and pillow. Denies other needs at this time. CB within reach.

## 2023-05-18 NOTE — PLAN OF CARE
Problem: Adult Inpatient Plan of Care  Goal: Plan of Care Review  Outcome: Ongoing, Progressing  Goal: Patient-Specific Goal (Individualized)  Outcome: Ongoing, Progressing  Goal: Absence of Hospital-Acquired Illness or Injury  Outcome: Ongoing, Progressing  Goal: Optimal Comfort and Wellbeing  Outcome: Ongoing, Progressing  Goal: Readiness for Transition of Care  Outcome: Ongoing, Progressing     Problem: Anemia (Chemotherapy Effects)  Goal: Anemia Symptom Improvement  Outcome: Ongoing, Progressing     Problem: Urinary Bleeding Risk or Actual (Chemotherapy Effects)  Goal: Absence of Hematuria  Outcome: Ongoing, Progressing     Problem: Nausea and Vomiting (Chemotherapy Effects)  Goal: Fluid and Electrolyte Balance  Outcome: Ongoing, Progressing     Problem: Neurotoxicity (Chemotherapy Effects)  Goal: Neurotoxicity Symptom Control  Outcome: Ongoing, Progressing     Problem: Neutropenia (Chemotherapy Effects)  Goal: Absence of Infection  Outcome: Ongoing, Progressing     Problem: Oral Mucositis (Chemotherapy Effects)  Goal: Improved Oral Mucous Membrane Integrity  Outcome: Ongoing, Progressing     Problem: Thrombocytopenia Bleeding Risk (Chemotherapy Effects)  Goal: Absence of Bleeding  Outcome: Ongoing, Progressing     Problem: Fatigue  Goal: Improved Activity Tolerance  Outcome: Ongoing, Progressing     Problem: Skin Injury Risk Increased  Goal: Skin Health and Integrity  Outcome: Ongoing, Progressing

## 2023-05-18 NOTE — HOSPITAL COURSE
05/18/2023 Per HPI   5/19/2023 HR and BP stalbe. H&H unchanged; Plts 66K with trends as follows 50K-49K-45K BMP WNL. Echo with normal LVEF  Troponin 4.6-4.7-5.1

## 2023-05-18 NOTE — ED NOTES
Pt arrives via EMS from [facility] admitted for observation for NSTEMI. Pt awake and alert, answering questions appropriately. Pt denies chest pain. C/o left shoulder pain onset after arrival to [facility]. Will administer PRN pain medication. CB within reach.

## 2023-05-18 NOTE — PLAN OF CARE
"RN OBS REVIEW    Currently pending TTE and Card clearance. Oncology now deferring to Cards for workup. Assigned CM to follow.     Future Appointments   Date Time Provider Department Center   7/17/2023  8:50 AM [facility] LAB [facility]   7/24/2023  1:00 PM Garry Mcqueen MD [facility] HEM ONC [facility]     /69 (Patient Position: Lying)   Pulse 74   Temp 96.4 °F (35.8 °C) (Oral)   Resp 18   Ht 6' 1" (1.854 m)   Wt 61.3 kg (135 lb 0.5 oz)   SpO2 97%   Breastfeeding No   BMI 17.82 kg/m²          Medication List        ASK your doctor about these medications      acetaminophen 500 MG tablet  Commonly known as: TYLENOL     apixaban 5 mg Tab  Commonly known as: ELIQUIS  Take 1 tablet (5 mg total) by mouth 2 (two) times daily.     diclofenac sodium 1 % Gel  Commonly known as: VOLTAREN     HYDROcodone-acetaminophen  mg per tablet  Commonly known as: NORCO  Take 1 tablet by mouth every 6 (six) hours as needed (chronic pain). Take 1/2 tablet by mouth in the morning and 1/2 tablet by mouth in the evening as needed     promethazine 25 MG tablet  Commonly known as: PHENERGAN  Take 1 tablet (25 mg total) by mouth every 6 (six) hours as needed for Nausea.     SALONPAS (CAPSAICIN-MENTHOL) 0.025-1.25 % Ptmd  Generic drug: capsaicin-menthol     scopolamine 1.3-1.5 mg (1 mg over 3 days)  Commonly known as: TRANSDERM-SCOP  Place 1 patch onto the skin every 72 hours.     TAGRISSO 40 mg Tab  Generic drug: osimertinib  Take 1 tablet (40 mg) by mouth once daily.              "

## 2023-05-19 ENCOUNTER — PATIENT OUTREACH (OUTPATIENT)
Dept: ADMINISTRATIVE | Facility: OTHER | Age: 78
End: 2023-05-19
Payer: MEDICARE

## 2023-05-19 PROBLEM — D50.9 MICROCYTIC ANEMIA: Status: ACTIVE | Noted: 2023-05-19

## 2023-05-19 LAB
ALBUMIN SERPL BCP-MCNC: 3.5 G/DL (ref 3.5–5.2)
ALP SERPL-CCNC: 81 U/L (ref 55–135)
ALT SERPL W/O P-5'-P-CCNC: 9 U/L (ref 10–44)
ANION GAP SERPL CALC-SCNC: 11 MMOL/L (ref 8–16)
APTT PPP: 30.7 SEC (ref 21–32)
APTT PPP: 54.1 SEC (ref 21–32)
APTT PPP: 57.4 SEC (ref 21–32)
APTT PPP: 82.9 SEC (ref 21–32)
AST SERPL-CCNC: 27 U/L (ref 10–40)
BASOPHILS # BLD AUTO: 0.03 K/UL (ref 0–0.2)
BASOPHILS NFR BLD: 0.5 % (ref 0–1.9)
BILIRUB SERPL-MCNC: 0.5 MG/DL (ref 0.1–1)
BUN SERPL-MCNC: 17 MG/DL (ref 8–23)
CALCIUM SERPL-MCNC: 9.5 MG/DL (ref 8.7–10.5)
CHLORIDE SERPL-SCNC: 108 MMOL/L (ref 95–110)
CO2 SERPL-SCNC: 19 MMOL/L (ref 23–29)
CREAT SERPL-MCNC: 0.9 MG/DL (ref 0.5–1.4)
DIFFERENTIAL METHOD: ABNORMAL
EOSINOPHIL # BLD AUTO: 0.2 K/UL (ref 0–0.5)
EOSINOPHIL NFR BLD: 3.1 % (ref 0–8)
ERYTHROCYTE [DISTWIDTH] IN BLOOD BY AUTOMATED COUNT: 15.5 % (ref 11.5–14.5)
EST. GFR  (NO RACE VARIABLE): >60 ML/MIN/1.73 M^2
GLUCOSE SERPL-MCNC: 76 MG/DL (ref 70–110)
HCT VFR BLD AUTO: 37 % (ref 37–48.5)
HGB BLD-MCNC: 11.6 G/DL (ref 12–16)
IMM GRANULOCYTES # BLD AUTO: 0.01 K/UL (ref 0–0.04)
IMM GRANULOCYTES NFR BLD AUTO: 0.2 % (ref 0–0.5)
LYMPHOCYTES # BLD AUTO: 1.8 K/UL (ref 1–4.8)
LYMPHOCYTES NFR BLD: 31.1 % (ref 18–48)
MAGNESIUM SERPL-MCNC: 2.3 MG/DL (ref 1.6–2.6)
MCH RBC QN AUTO: 24.8 PG (ref 27–31)
MCHC RBC AUTO-ENTMCNC: 31.4 G/DL (ref 32–36)
MCV RBC AUTO: 79 FL (ref 82–98)
MONOCYTES # BLD AUTO: 0.4 K/UL (ref 0.3–1)
MONOCYTES NFR BLD: 6.8 % (ref 4–15)
NEUTROPHILS # BLD AUTO: 3.3 K/UL (ref 1.8–7.7)
NEUTROPHILS NFR BLD: 58.3 % (ref 38–73)
NRBC BLD-RTO: 0 /100 WBC
PHOSPHATE SERPL-MCNC: 3.6 MG/DL (ref 2.7–4.5)
PLATELET # BLD AUTO: 66 K/UL (ref 150–450)
PMV BLD AUTO: ABNORMAL FL (ref 9.2–12.9)
POTASSIUM SERPL-SCNC: 4.4 MMOL/L (ref 3.5–5.1)
PROT SERPL-MCNC: 7.1 G/DL (ref 6–8.4)
RBC # BLD AUTO: 4.68 M/UL (ref 4–5.4)
SODIUM SERPL-SCNC: 138 MMOL/L (ref 136–145)
WBC # BLD AUTO: 5.73 K/UL (ref 3.9–12.7)

## 2023-05-19 PROCEDURE — 63600175 PHARM REV CODE 636 W HCPCS: Performed by: STUDENT IN AN ORGANIZED HEALTH CARE EDUCATION/TRAINING PROGRAM

## 2023-05-19 PROCEDURE — 85730 THROMBOPLASTIN TIME PARTIAL: CPT | Mod: 91 | Performed by: INTERNAL MEDICINE

## 2023-05-19 PROCEDURE — 25000003 PHARM REV CODE 250: Performed by: STUDENT IN AN ORGANIZED HEALTH CARE EDUCATION/TRAINING PROGRAM

## 2023-05-19 PROCEDURE — 99233 PR SUBSEQUENT HOSPITAL CARE,LEVL III: ICD-10-PCS | Mod: ,,, | Performed by: NURSE PRACTITIONER

## 2023-05-19 PROCEDURE — 25000003 PHARM REV CODE 250: Performed by: NURSE PRACTITIONER

## 2023-05-19 PROCEDURE — 83735 ASSAY OF MAGNESIUM: CPT | Performed by: STUDENT IN AN ORGANIZED HEALTH CARE EDUCATION/TRAINING PROGRAM

## 2023-05-19 PROCEDURE — 80053 COMPREHEN METABOLIC PANEL: CPT | Performed by: STUDENT IN AN ORGANIZED HEALTH CARE EDUCATION/TRAINING PROGRAM

## 2023-05-19 PROCEDURE — 11000001 HC ACUTE MED/SURG PRIVATE ROOM

## 2023-05-19 PROCEDURE — 36415 COLL VENOUS BLD VENIPUNCTURE: CPT | Performed by: INTERNAL MEDICINE

## 2023-05-19 PROCEDURE — 85025 COMPLETE CBC W/AUTO DIFF WBC: CPT | Performed by: INTERNAL MEDICINE

## 2023-05-19 PROCEDURE — 85730 THROMBOPLASTIN TIME PARTIAL: CPT | Performed by: INTERNAL MEDICINE

## 2023-05-19 PROCEDURE — 36415 COLL VENOUS BLD VENIPUNCTURE: CPT | Performed by: STUDENT IN AN ORGANIZED HEALTH CARE EDUCATION/TRAINING PROGRAM

## 2023-05-19 PROCEDURE — 84100 ASSAY OF PHOSPHORUS: CPT | Performed by: STUDENT IN AN ORGANIZED HEALTH CARE EDUCATION/TRAINING PROGRAM

## 2023-05-19 PROCEDURE — 99233 SBSQ HOSP IP/OBS HIGH 50: CPT | Mod: ,,, | Performed by: NURSE PRACTITIONER

## 2023-05-19 RX ORDER — METOPROLOL SUCCINATE 25 MG/1
25 TABLET, EXTENDED RELEASE ORAL DAILY
Status: DISCONTINUED | OUTPATIENT
Start: 2023-05-19 | End: 2023-05-20 | Stop reason: HOSPADM

## 2023-05-19 RX ORDER — ATORVASTATIN CALCIUM 40 MG/1
40 TABLET, FILM COATED ORAL DAILY
Qty: 90 TABLET | Refills: 3 | Status: SHIPPED | OUTPATIENT
Start: 2023-05-19 | End: 2023-07-10 | Stop reason: DRUGHIGH

## 2023-05-19 RX ORDER — METOPROLOL SUCCINATE 25 MG/1
25 TABLET, EXTENDED RELEASE ORAL DAILY
Qty: 90 TABLET | Refills: 3 | Status: SHIPPED | OUTPATIENT
Start: 2023-05-20 | End: 2023-07-10 | Stop reason: CLARIF

## 2023-05-19 RX ORDER — CLOPIDOGREL BISULFATE 75 MG/1
75 TABLET ORAL DAILY
Qty: 90 TABLET | Refills: 3 | Status: SHIPPED | OUTPATIENT
Start: 2023-05-20 | End: 2023-07-10

## 2023-05-19 RX ADMIN — HEPARIN SODIUM 9 UNITS/KG/HR: 10000 INJECTION, SOLUTION INTRAVENOUS at 07:05

## 2023-05-19 RX ADMIN — METOPROLOL SUCCINATE 25 MG: 25 TABLET, EXTENDED RELEASE ORAL at 10:05

## 2023-05-19 RX ADMIN — ASPIRIN 81 MG CHEWABLE TABLET 81 MG: 81 TABLET CHEWABLE at 10:05

## 2023-05-19 RX ADMIN — CLOPIDOGREL BISULFATE 75 MG: 75 TABLET ORAL at 10:05

## 2023-05-19 RX ADMIN — ATORVASTATIN CALCIUM 20 MG: 20 TABLET, FILM COATED ORAL at 10:05

## 2023-05-19 NOTE — ASSESSMENT & PLAN NOTE
- symptoms concerning for ischemic etiology   - troponin peaked at  5 and down to 4.6; EKG SR; echo with NSR  - remains  CP free   - cleared by Hem Onc and on IV Heparin drip; recommend IV Heparin for total of 48 hours and okay  DC asa once heparin is initiated. Plt 66K this AM  - continue Plavix, BB, statin  - echo with normal LVEF TTE pending   - continue medical management given comorbidities- Discussed with patient and daughter this AM

## 2023-05-19 NOTE — PROGRESS NOTES
[facility] - Telemetry  Cardiology  Progress Note    Patient Name: Nathan Simpson  MRN: [id number]  Admission Date: 5/17/2023  Hospital Length of Stay: 1 days  Code Status: Full Code   Attending Physician: Salma Russell MD   Primary Care Physician: Edilia Carrillo MD  Expected Discharge Date:   Principal Problem:NSTEMI (non-ST elevated myocardial infarction)    Subjective:     Hospital Course:   05/18/2023 Per HPI   5/19/2023 HR and BP stalbe. H&H unchanged; Plts 66K with trends as follows 50K-49K-45K BMP WNL. Echo with normal LVEF  Troponin 4.6-4.7-5.1          Review of Systems   Constitutional: Negative for chills, decreased appetite, diaphoresis and fever.   Cardiovascular:  Negative for chest pain, claudication, cyanosis, dyspnea on exertion, irregular heartbeat, leg swelling, near-syncope, orthopnea, palpitations, paroxysmal nocturnal dyspnea and syncope.   Respiratory:  Negative for cough, hemoptysis, shortness of breath and wheezing.    Gastrointestinal:  Negative for bloating, abdominal pain, constipation, diarrhea, melena, nausea and vomiting.   Neurological:  Negative for dizziness and weakness.   Objective:     Vital Signs (Most Recent):  Temp: 96.7 °F (35.9 °C) (05/19/23 0731)  Pulse: 67 (05/19/23 0731)  Resp: 18 (05/19/23 0731)  BP: (!) 109/55 (05/19/23 0731)  SpO2: 99 % (05/19/23 0731) Vital Signs (24h Range):  Temp:  [96.1 °F (35.6 °C)-97.8 °F (36.6 °C)] 96.7 °F (35.9 °C)  Pulse:  [61-76] 67  Resp:  [18] 18  SpO2:  [96 %-99 %] 99 %  BP: (101-118)/(55-61) 109/55     Weight: 61.2 kg (135 lb)  Body mass index is 17.81 kg/m².     SpO2: 99 %       No intake or output data in the 24 hours ending 05/19/23 1003    Lines/Drains/Airways       Peripheral Intravenous Line  Duration                  Peripheral IV - Single Lumen 05/19/23 0745 22 G Left;Posterior Forearm <1 day                       Physical Exam  Constitutional:       General: She is not in acute distress.     Appearance: She is cachectic.    Cardiovascular:      Rate and Rhythm: Normal rate and regular rhythm.      Heart sounds: No murmur heard.    No gallop.   Pulmonary:      Effort: Pulmonary effort is normal. No respiratory distress.      Breath sounds: Normal breath sounds. No wheezing.   Abdominal:      General: Bowel sounds are normal. There is no distension.      Palpations: Abdomen is soft.      Tenderness: There is no abdominal tenderness.   Skin:     General: Skin is warm and dry.   Neurological:      Mental Status: She is alert and oriented to person, place, and time.          Significant Labs: BMP:   Recent Labs   Lab 05/17/23  1811 05/18/23  0348 05/19/23  0528    94 76    138 138   K 4.3 4.5 4.4    108 108   CO2 22* 21* 19*   BUN 17 14 17   CREATININE 0.98 0.9 0.9   CALCIUM 9.6 9.8 9.5   MG 2.2 2.4 2.3    and CBC   Recent Labs   Lab 05/18/23  0348 05/18/23  1116 05/19/23  0048   WBC 6.67 5.82 5.73   HGB 11.8* 11.9* 11.6*   HCT 38.0 38.2 37.0   PLT 49* 50* 66*        Significant Imaging: Echocardiogram: Transthoracic echo (TTE) complete (Cupid Only):   Results for orders placed or performed during the hospital encounter of 05/17/23   Echo   Result Value Ref Range    BSA 1.78 m2    TDI SEPTAL 0.07 m/s    LV LATERAL E/E' RATIO 9.88 m/s    LV SEPTAL E/E' RATIO 11.29 m/s    LA WIDTH 3.43 cm    IVC diameter 1.65 cm    Left Ventricular Outflow Tract Mean Velocity 0.62 cm/s    Left Ventricular Outflow Tract Mean Gradient 1.95 mmHg    TDI LATERAL 0.08 m/s    LVIDd 4.89 3.5 - 6.0 cm    IVS 0.97 0.6 - 1.1 cm    Posterior Wall 0.91 0.6 - 1.1 cm    LVIDs 3.04 2.1 - 4.0 cm    FS 38 28 - 44 %    LV mass 161.47 g    LA size 3.27 cm    RV S' 0.02 cm/s    Left Ventricle Relative Wall Thickness 0.37 cm    AV mean gradient 4 mmHg    AV valve area 2.02 cm2    AV Velocity Ratio 0.72     AV index (prosthetic) 0.80     MV mean gradient 2 mmHg    MV valve area p 1/2 method 3.43 cm2    MV valve area by continuity eq 1.74 cm2    E/A ratio 0.94      Mean e' 0.08 m/s    E wave deceleration time 195.63 msec    Pulm vein S/D ratio 1.44     LVOT diameter 1.79 cm    LVOT area 2.5 cm2    LVOT peak jcarlos 1.15 m/s    LVOT peak VTI 20.50 cm    Ao peak jcarlos 1.60 m/s    Ao VTI 25.5 cm    Mr max jcarlos 4.90 m/s    LVOT stroke volume 51.56 cm3    AV peak gradient 10 mmHg    MV peak gradient 4 mmHg    E/E' ratio 10.53 m/s    MV Peak E Jcarlos 0.79 m/s    TR Max Jcarlos 2.37 m/s    MV VTI 29.6 cm    MV stenosis pressure 1/2 time 64.14 ms    MV Peak A Jcarlos 0.84 m/s    PV Peak S Jcarlos 0.52 m/s    PV Peak D Jcarlos 0.36 m/s    LV Systolic Volume 36.18 mL    LV Systolic Volume Index 19.9 mL/m2    LV Diastolic Volume 112.09 mL    LV Diastolic Volume Index 61.59 mL/m2    LV Mass Index 89 g/m2    RA Major Axis 4.62 cm    Left Atrium Major Axis 5.11 cm    Triscuspid Valve Regurgitation Peak Gradient 22 mmHg    Pappas's Biplane MOD Ejection Fraction 7 %    EF 55 %    Right Atrial Pressure (from IVC) 3 mmHg    TV rest pulmonary artery pressure 25 mmHg    Narrative    · The left ventricle is normal in size with normal systolic function.  · The estimated ejection fraction is 55%.  · Normal left ventricular diastolic function.  · Mild mitral regurgitation.  · The estimated PA systolic pressure is 25 mmHg.  · Normal right ventricular size with normal right ventricular systolic   function.  · Normal central venous pressure (3 mmHg).  · There are segmental left ventricular wall motion abnormalities.        Assessment and Plan:     Brief HPI: Seen on AM rounds with Dr Celaya with daughter at bedside. Denies any complaints this AM. Discussed POC as detailed below-verbalized understanding and agrees with POC     * NSTEMI (non-ST elevated myocardial infarction)  - symptoms concerning for ischemic etiology   - troponin peaked at  5 and down to 4.6; EKG SR; echo with NSR  - remains  CP free   - cleared by Hem Onc and on IV Heparin drip; recommend IV Heparin for total of 48 hours and okay  DC asa once heparin is  initiated. Plt 66K this AM  - continue Plavix, BB, statin  - echo with normal LVEF TTE pending   - continue medical management given comorbidities- Discussed with patient and daughter this AM      Thrombocytopenia  - Plts 66K trended up to 50K-49K  - Hem Onc consulted with etiology related to CA treatment as well as acute issues  - will continue antiplatelet therapy     Acute deep vein thrombosis (DVT) of left femoral vein  - Left CFV, POP, GSV DVT diagnosed in 04/2023  - resume home Eliquis- will need lifelong OAC per Oncology   - ASA as detailed under NSTEMI     Adenocarcinoma of left lung, stage 4 EGFR positive  -followed by Dr Mcqueen   - Hem Onc consult with recs noted         VTE Risk Mitigation (From admission, onward)         Ordered     heparin 25,000 units in dextrose 5% (100 units/ml) IV bolus from bag - ADDITIONAL PRN BOLUS - 60 units/kg (max bolus 4000 units)  As needed (PRN)        Question:  Heparin Infusion Adjustment (DO NOT MODIFY ANSWER)  Answer:  \[email or web address]\epic\Images\Pharmacy\HeparinInfusions\heparin LOW INTENSITY nomogram for [facility] [id number]    05/18/23 1044     heparin 25,000 units in dextrose 5% (100 units/ml) IV bolus from bag - ADDITIONAL PRN BOLUS - 30 units/kg (max bolus 4000 units)  As needed (PRN)        Question:  Heparin Infusion Adjustment (DO NOT MODIFY ANSWER)  Answer:  \\[email or web address]\epic\Images\Pharmacy\HeparinInfusions\heparin LOW INTENSITY nomogram for [facility] [id number].pdf    05/18/23 1044     heparin 25,000 units in dextrose 5% 250 mL (100 units/mL) infusion LOW INTENSITY nomogram - [facility]  Continuous        Question Answer Comment   Heparin Infusion Adjustment (DO NOT MODIFY ANSWER) \\[email or web address]\epic\Images\Pharmacy\HeparinInfusions\heparin LOW INTENSITY nomogram for [facility] [id number]    Begin at (in units/kg/hr) 12        05/18/23 1044              Plan as detailed above. Continue medical management for NSTEMI. Cardiology to sign off for now. Please call with any questions or  concerns     Nehal Talavera, APRN, ANP  Cardiology  [facility] - Telemetry

## 2023-05-19 NOTE — PLAN OF CARE
Problem: Adult Inpatient Plan of Care  Goal: Plan of Care Review  Outcome: Ongoing, Progressing  Goal: Patient-Specific Goal (Individualized)  Outcome: Ongoing, Progressing  Goal: Absence of Hospital-Acquired Illness or Injury  Outcome: Ongoing, Progressing  Goal: Optimal Comfort and Wellbeing  Outcome: Ongoing, Progressing

## 2023-05-19 NOTE — ASSESSMENT & PLAN NOTE
- Left CFV, POP, GSV DVT diagnosed in 04/2023  - resume home Eliquis- will need lifelong OAC per Oncology   - ASA as detailed under NSTEMI

## 2023-05-19 NOTE — SUBJECTIVE & OBJECTIVE
Review of Systems   Constitutional: Negative for chills, decreased appetite, diaphoresis and fever.   Cardiovascular:  Negative for chest pain, claudication, cyanosis, dyspnea on exertion, irregular heartbeat, leg swelling, near-syncope, orthopnea, palpitations, paroxysmal nocturnal dyspnea and syncope.   Respiratory:  Negative for cough, hemoptysis, shortness of breath and wheezing.    Gastrointestinal:  Negative for bloating, abdominal pain, constipation, diarrhea, melena, nausea and vomiting.   Neurological:  Negative for dizziness and weakness.   Objective:     Vital Signs (Most Recent):  Temp: 96.7 °F (35.9 °C) (05/19/23 0731)  Pulse: 67 (05/19/23 0731)  Resp: 18 (05/19/23 0731)  BP: (!) 109/55 (05/19/23 0731)  SpO2: 99 % (05/19/23 0731) Vital Signs (24h Range):  Temp:  [96.1 °F (35.6 °C)-97.8 °F (36.6 °C)] 96.7 °F (35.9 °C)  Pulse:  [61-76] 67  Resp:  [18] 18  SpO2:  [96 %-99 %] 99 %  BP: (101-118)/(55-61) 109/55     Weight: 61.2 kg (135 lb)  Body mass index is 17.81 kg/m².     SpO2: 99 %       No intake or output data in the 24 hours ending 05/19/23 1003    Lines/Drains/Airways       Peripheral Intravenous Line  Duration                  Peripheral IV - Single Lumen 05/19/23 0745 22 G Left;Posterior Forearm <1 day                       Physical Exam  Constitutional:       General: She is not in acute distress.     Appearance: She is cachectic.   Cardiovascular:      Rate and Rhythm: Normal rate and regular rhythm.      Heart sounds: No murmur heard.    No gallop.   Pulmonary:      Effort: Pulmonary effort is normal. No respiratory distress.      Breath sounds: Normal breath sounds. No wheezing.   Abdominal:      General: Bowel sounds are normal. There is no distension.      Palpations: Abdomen is soft.      Tenderness: There is no abdominal tenderness.   Skin:     General: Skin is warm and dry.   Neurological:      Mental Status: She is alert and oriented to person, place, and time.          Significant  Labs: BMP:   Recent Labs   Lab 05/17/23  1811 05/18/23  0348 05/19/23  0528    94 76    138 138   K 4.3 4.5 4.4    108 108   CO2 22* 21* 19*   BUN 17 14 17   CREATININE 0.98 0.9 0.9   CALCIUM 9.6 9.8 9.5   MG 2.2 2.4 2.3    and CBC   Recent Labs   Lab 05/18/23  0348 05/18/23  1116 05/19/23  0048   WBC 6.67 5.82 5.73   HGB 11.8* 11.9* 11.6*   HCT 38.0 38.2 37.0   PLT 49* 50* 66*        Significant Imaging: Echocardiogram: Transthoracic echo (TTE) complete (Cupid Only):   Results for orders placed or performed during the hospital encounter of 05/17/23   Echo   Result Value Ref Range    BSA 1.78 m2    TDI SEPTAL 0.07 m/s    LV LATERAL E/E' RATIO 9.88 m/s    LV SEPTAL E/E' RATIO 11.29 m/s    LA WIDTH 3.43 cm    IVC diameter 1.65 cm    Left Ventricular Outflow Tract Mean Velocity 0.62 cm/s    Left Ventricular Outflow Tract Mean Gradient 1.95 mmHg    TDI LATERAL 0.08 m/s    LVIDd 4.89 3.5 - 6.0 cm    IVS 0.97 0.6 - 1.1 cm    Posterior Wall 0.91 0.6 - 1.1 cm    LVIDs 3.04 2.1 - 4.0 cm    FS 38 28 - 44 %    LV mass 161.47 g    LA size 3.27 cm    RV S' 0.02 cm/s    Left Ventricle Relative Wall Thickness 0.37 cm    AV mean gradient 4 mmHg    AV valve area 2.02 cm2    AV Velocity Ratio 0.72     AV index (prosthetic) 0.80     MV mean gradient 2 mmHg    MV valve area p 1/2 method 3.43 cm2    MV valve area by continuity eq 1.74 cm2    E/A ratio 0.94     Mean e' 0.08 m/s    E wave deceleration time 195.63 msec    Pulm vein S/D ratio 1.44     LVOT diameter 1.79 cm    LVOT area 2.5 cm2    LVOT peak jcarlos 1.15 m/s    LVOT peak VTI 20.50 cm    Ao peak jcarlos 1.60 m/s    Ao VTI 25.5 cm    Mr max jcarlos 4.90 m/s    LVOT stroke volume 51.56 cm3    AV peak gradient 10 mmHg    MV peak gradient 4 mmHg    E/E' ratio 10.53 m/s    MV Peak E Jcarlos 0.79 m/s    TR Max Jcarlos 2.37 m/s    MV VTI 29.6 cm    MV stenosis pressure 1/2 time 64.14 ms    MV Peak A Jcarlos 0.84 m/s    PV Peak S Jcarlos 0.52 m/s    PV Peak D Jcarlos 0.36 m/s    LV Systolic Volume  36.18 mL    LV Systolic Volume Index 19.9 mL/m2    LV Diastolic Volume 112.09 mL    LV Diastolic Volume Index 61.59 mL/m2    LV Mass Index 89 g/m2    RA Major Axis 4.62 cm    Left Atrium Major Axis 5.11 cm    Triscuspid Valve Regurgitation Peak Gradient 22 mmHg    Pappas's Biplane MOD Ejection Fraction 7 %    EF 55 %    Right Atrial Pressure (from IVC) 3 mmHg    TV rest pulmonary artery pressure 25 mmHg    Narrative    · The left ventricle is normal in size with normal systolic function.  · The estimated ejection fraction is 55%.  · Normal left ventricular diastolic function.  · Mild mitral regurgitation.  · The estimated PA systolic pressure is 25 mmHg.  · Normal right ventricular size with normal right ventricular systolic   function.  · Normal central venous pressure (3 mmHg).  · There are segmental left ventricular wall motion abnormalities.

## 2023-05-19 NOTE — ASSESSMENT & PLAN NOTE
- Plts 66K trended up to 50K-49K  - Hem Onc consulted with etiology related to CA treatment as well as acute issues  - will continue antiplatelet therapy

## 2023-05-19 NOTE — PROGRESS NOTES
IP Liaison - Initial Visit Note    Patient: Nathan Simpson  MRN:  [id number]  Date of Service:  5/19/2023  Completed by:  LETICIA Zee    Reason for Visit   Patient presents with    IP Liaison Initial Visit       RSW met with patient and pt daughter Fidelina at bedside in order to complete SDOH questionnaire and liaison assessment. Fidelina has identified no social barriers to cares.  Fidelina interested in Medicaid PCA sitter information, RSW provided Fidelina and pt with Medicaid Long Term Care Services information. Fidelina expressed understanding of resources provided and declined the need for additional assistance at this time.    The following were addressed during this visit:  - Review SDOH Questions   - Complete patient assessment   - Complete initial visit with patient        Patient Summary     IP Liaison Patient Assessment    General  Level of Caregiver support: Assistance needed but no caregiver available  Have you had to make a decision between paying for any of the following in the last 2 months?: None  Transportation means: Family  Employment status: Retired and not working  Assessments  Was the PHQ Depression Screening completed this visit?: No  Was the RUDY-7 Screening completed this visit?: No       LETICIA Zee

## 2023-05-19 NOTE — PROGRESS NOTES
"[facility] Hospital Medicine Progress Note    Primary Team: [facility] Hospitalist Team A  Attending Physician: Salma Russell MD  Resident:   Intern: Percy    Subjective:      Patient reports feeling good today. No further episodes of chest pain since she's been in the hospitalization. No headaches, nausea, vomiting, weakness, SOB.      Objective:     Last 24 Hour Vital Signs:  BP  Min: 101/56  Max: 126/69  Temp  Av.7 °F (35.9 °C)  Min: 96.1 °F (35.6 °C)  Max: 97.8 °F (36.6 °C)  Pulse  Av.8  Min: 61  Max: 76  Resp  Av  Min: 18  Max: 18  SpO2  Av.8 %  Min: 96 %  Max: 99 %  Height  Av' 1" (185.4 cm)  Min: 6' 1" (185.4 cm)  Max: 6' 1" (185.4 cm)  Weight  Av.2 kg (135 lb)  Min: 61.2 kg (135 lb)  Max: 61.2 kg (135 lb)  No intake/output data recorded.    Physical Examination:  General: A&Ox3, conversational, pleasant  HEENT: PERRL, EOMI, moist mucus membranes    Cardiovascular: RRR, no murmurs appreciated  Pulm: faint crackles in LLL, no resp distress  Abdomen: Soft, non-tender, non-distended; normal BS  Skin: No rashes or erythema noted, no ecchymosis  Extremities: Atraumatic, no edema  Pulses: 2+ and symmetric  Neurological: A&Ox3, no focal deficits  Psychiatric: Normal mood and affect, thought content normal    Laboratory:  Recent Labs   Lab 23  1811 23  0348 23  1116 23  0048 23  0528   WBC 6.40 6.67 5.82 5.73  --    HGB 11.7* 11.8* 11.9* 11.6*  --    HCT 37.7 38.0 38.2 37.0  --    PLT 45* 49* 50* 66*  --    MCV 79* 79* 79* 79*  --    RDW 15.3* 15.6* 15.6* 15.5*  --     138  --   --  138   K 4.3 4.5  --   --  4.4    108  --   --  108   CO2 22* 21*  --   --  19*   BUN 17 14  --   --  17   CREATININE 0.98 0.9  --   --  0.9    94  --   --  76   PROT 7.7 7.5  --   --  7.1   ALBUMIN 4.3 3.6  --   --  3.5   BILITOT 0.6 0.5  --   --  0.5   AST 57* 34  --   --  27   ALKPHOS 95 86  --   --  81   ALT 14 9*  --   --  9*     Laboratory Data Reviewed. "     Microbiology Data Reviewed.     Other Results:  EKG (my interpretation): NSR with dynamic T wave changes noted between different EKGs    Radiology Data Reviewed:   Pertinent Findings:  CXR: left sided reticular opacities     CTPE: left sided mass with surrounding inflammation. Hilar LA. No PE.    Current Medications:     Infusions:   heparin (porcine) in D5W 9 Units/kg/hr (05/19/23 0708)        Scheduled:   aspirin  81 mg Oral Daily    atorvastatin  20 mg Oral Daily    clopidogreL  75 mg Oral Daily    metoprolol tartrate  25 mg Oral BID        PRN:  acetaminophen, heparin (PORCINE), heparin (PORCINE), HYDROcodone-acetaminophen, promethazine    Assessment:     77 year old with history of NSC lung cancer, stage IV, PAD, DVT, pancreaittis presents with nausea, vomiting, and left arm/shoulder pain and found to have NSTEMI with trop peaking at 4.7. Cardiology and heme/onc consulted. Patient was started on heparin gtt for 48 hours. Given other comorbidities, patient is not candidate for PCI. Continue medical management.      Plan:     NSTEMI  - patient presented with chest pain radiating to left shoulder, left arm, and back, associated with nausea  - on presentation, troponin 4.5  - serial EKGs with dynamic T wave changes in anterolateral leads  - cardiology consulted, patient is not good candidate for PCI given significant comorbidities  - started on heparin gtt for 48 hrs  - plavix, statin, and BB  - will add on ACE/ARB as BP tolerates  - once heparin gtt completed, will discharge on Plavix and Eliquis    Chemotherapy induced thrombocytopenia  - Plt 45 on admission, lowest it has been recently  - consulted Dr. Mcqueen, who is her oncologist; states that it is okay for patient to be on heparin gtt  - will monitor plts and watch for neurological changes  - plts trending up slowly     Stage IV Adenocacinoma of Left Lung  Left Lung Inflammation  Possible metastasis to L1  - No cough, fever, shortness of breath to  suggest pneumonia  - Hold chemo for now as possible adverse events is cardiomyopathy and thrombocytopenia  - Dr. 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  - Keep family at bedside as much as possible  - delirium precautions     Mixed anemia  Iron deficiency anemia  Anemia of chronic disease  - Hb 11.6, MCV 79  - Iron 35, TIBC 297, Sat 12, ferritin 315  - stable, CTM     CKD Stage I  - Cr stable  - Avoid nephrotoxic agents     Hyperlipidemia  - Start lipitor 10 QD     History of acute pancreatitis  - Monitor    Aziza Richey MD  [facility] HO-II  hospitals Internal Medicine Team A    hospitals Medicine Hospitalist Pager numbers:   hospitals Hospitalist Medicine Team A (Danuta/Yvonne): [phone]  hospitals Hospitalist Medicine Team B (Matthew/Hussein):  [phone]

## 2023-05-20 VITALS
TEMPERATURE: 98 F | RESPIRATION RATE: 18 BRPM | SYSTOLIC BLOOD PRESSURE: 114 MMHG | HEIGHT: 72 IN | HEART RATE: 81 BPM | WEIGHT: 135 LBS | OXYGEN SATURATION: 98 % | BODY MASS INDEX: 18.28 KG/M2 | DIASTOLIC BLOOD PRESSURE: 54 MMHG

## 2023-05-20 LAB
ALBUMIN SERPL BCP-MCNC: 3.4 G/DL (ref 3.5–5.2)
ALP SERPL-CCNC: 85 U/L (ref 55–135)
ALT SERPL W/O P-5'-P-CCNC: 7 U/L (ref 10–44)
ANION GAP SERPL CALC-SCNC: 7 MMOL/L (ref 8–16)
APTT PPP: 55.4 SEC (ref 21–32)
APTT PPP: 58.4 SEC (ref 21–32)
AST SERPL-CCNC: 25 U/L (ref 10–40)
BASOPHILS # BLD AUTO: 0.03 K/UL (ref 0–0.2)
BASOPHILS NFR BLD: 0.6 % (ref 0–1.9)
BILIRUB SERPL-MCNC: 0.3 MG/DL (ref 0.1–1)
BUN SERPL-MCNC: 22 MG/DL (ref 8–23)
CALCIUM SERPL-MCNC: 9.2 MG/DL (ref 8.7–10.5)
CHLORIDE SERPL-SCNC: 109 MMOL/L (ref 95–110)
CO2 SERPL-SCNC: 23 MMOL/L (ref 23–29)
CREAT SERPL-MCNC: 1 MG/DL (ref 0.5–1.4)
DIFFERENTIAL METHOD: ABNORMAL
EOSINOPHIL # BLD AUTO: 0.2 K/UL (ref 0–0.5)
EOSINOPHIL NFR BLD: 3.6 % (ref 0–8)
ERYTHROCYTE [DISTWIDTH] IN BLOOD BY AUTOMATED COUNT: 15.6 % (ref 11.5–14.5)
EST. GFR  (NO RACE VARIABLE): 58 ML/MIN/1.73 M^2
GLUCOSE SERPL-MCNC: 90 MG/DL (ref 70–110)
HCT VFR BLD AUTO: 35.9 % (ref 37–48.5)
HGB BLD-MCNC: 11.2 G/DL (ref 12–16)
IMM GRANULOCYTES # BLD AUTO: 0.02 K/UL (ref 0–0.04)
IMM GRANULOCYTES NFR BLD AUTO: 0.4 % (ref 0–0.5)
LYMPHOCYTES # BLD AUTO: 1.7 K/UL (ref 1–4.8)
LYMPHOCYTES NFR BLD: 31.6 % (ref 18–48)
MAGNESIUM SERPL-MCNC: 2.3 MG/DL (ref 1.6–2.6)
MCH RBC QN AUTO: 24.3 PG (ref 27–31)
MCHC RBC AUTO-ENTMCNC: 31.2 G/DL (ref 32–36)
MCV RBC AUTO: 78 FL (ref 82–98)
MONOCYTES # BLD AUTO: 0.4 K/UL (ref 0.3–1)
MONOCYTES NFR BLD: 7.1 % (ref 4–15)
NEUTROPHILS # BLD AUTO: 3 K/UL (ref 1.8–7.7)
NEUTROPHILS NFR BLD: 56.7 % (ref 38–73)
NRBC BLD-RTO: 0 /100 WBC
PHOSPHATE SERPL-MCNC: 3.5 MG/DL (ref 2.7–4.5)
PLATELET # BLD AUTO: 83 K/UL (ref 150–450)
PMV BLD AUTO: ABNORMAL FL (ref 9.2–12.9)
POTASSIUM SERPL-SCNC: 3.9 MMOL/L (ref 3.5–5.1)
PROT SERPL-MCNC: 7 G/DL (ref 6–8.4)
RBC # BLD AUTO: 4.61 M/UL (ref 4–5.4)
SODIUM SERPL-SCNC: 139 MMOL/L (ref 136–145)
WBC # BLD AUTO: 5.35 K/UL (ref 3.9–12.7)

## 2023-05-20 PROCEDURE — 85025 COMPLETE CBC W/AUTO DIFF WBC: CPT | Performed by: INTERNAL MEDICINE

## 2023-05-20 PROCEDURE — 83735 ASSAY OF MAGNESIUM: CPT | Performed by: STUDENT IN AN ORGANIZED HEALTH CARE EDUCATION/TRAINING PROGRAM

## 2023-05-20 PROCEDURE — 36415 COLL VENOUS BLD VENIPUNCTURE: CPT | Performed by: INTERNAL MEDICINE

## 2023-05-20 PROCEDURE — 84100 ASSAY OF PHOSPHORUS: CPT | Performed by: STUDENT IN AN ORGANIZED HEALTH CARE EDUCATION/TRAINING PROGRAM

## 2023-05-20 PROCEDURE — 80053 COMPREHEN METABOLIC PANEL: CPT | Performed by: STUDENT IN AN ORGANIZED HEALTH CARE EDUCATION/TRAINING PROGRAM

## 2023-05-20 PROCEDURE — 85730 THROMBOPLASTIN TIME PARTIAL: CPT | Performed by: INTERNAL MEDICINE

## 2023-05-20 PROCEDURE — 25000003 PHARM REV CODE 250: Performed by: NURSE PRACTITIONER

## 2023-05-20 PROCEDURE — 99233 SBSQ HOSP IP/OBS HIGH 50: CPT | Mod: ,,, | Performed by: INTERNAL MEDICINE

## 2023-05-20 PROCEDURE — 63600175 PHARM REV CODE 636 W HCPCS: Performed by: STUDENT IN AN ORGANIZED HEALTH CARE EDUCATION/TRAINING PROGRAM

## 2023-05-20 PROCEDURE — 99233 PR SUBSEQUENT HOSPITAL CARE,LEVL III: ICD-10-PCS | Mod: ,,, | Performed by: INTERNAL MEDICINE

## 2023-05-20 PROCEDURE — 25000003 PHARM REV CODE 250: Performed by: STUDENT IN AN ORGANIZED HEALTH CARE EDUCATION/TRAINING PROGRAM

## 2023-05-20 PROCEDURE — 85730 THROMBOPLASTIN TIME PARTIAL: CPT | Mod: 91 | Performed by: INTERNAL MEDICINE

## 2023-05-20 RX ADMIN — CLOPIDOGREL BISULFATE 75 MG: 75 TABLET ORAL at 12:05

## 2023-05-20 RX ADMIN — METOPROLOL SUCCINATE 25 MG: 25 TABLET, EXTENDED RELEASE ORAL at 12:05

## 2023-05-20 RX ADMIN — ATORVASTATIN CALCIUM 20 MG: 20 TABLET, FILM COATED ORAL at 12:05

## 2023-05-20 RX ADMIN — HEPARIN SODIUM 9 UNITS/KG/HR: 10000 INJECTION, SOLUTION INTRAVENOUS at 12:05

## 2023-05-20 NOTE — PROGRESS NOTES
[facility] Medicine Progress Note    Primary Team: hospitals Hospitalist Team A  Attending Physician: Salma Russell MD  Resident:   Intern: Percy    Subjective:      Patient reports feeling good today. Eating breakfast with no issues. No further episodes of chest pain since she's been in the hospitalization. No headaches, nausea, vomiting, weakness, SOB.      Objective:     Last 24 Hour Vital Signs:  BP  Min: 83/47  Max: 109/55  Temp  Av.9 °F (36.1 °C)  Min: 96.1 °F (35.6 °C)  Max: 98.4 °F (36.9 °C)  Pulse  Av  Min: 62  Max: 68  Resp  Av  Min: 18  Max: 18  SpO2  Av.5 %  Min: 97 %  Max: 99 %  I/O last 3 completed shifts:  In: -   Out: 2 [Urine:2]    Physical Examination:  General: A&Ox3, conversational, pleasant  HEENT: PERRL, EOMI, moist mucus membranes    Cardiovascular: RRR, no murmurs appreciated  Pulm: faint crackles in LLL, no resp distress  Abdomen: Soft, non-tender, non-distended; normal BS  Skin: No rashes or erythema noted, no ecchymosis  Extremities: Atraumatic, no edema  Pulses: 2+ and symmetric  Neurological: A&Ox3, no focal deficits  Psychiatric: Normal mood and affect, thought content normal    Laboratory:  Recent Labs   Lab 23  0348 23  1116 23  0048 23  0528 23  0430   WBC 6.67 5.82 5.73  --  5.35   HGB 11.8* 11.9* 11.6*  --  11.2*   HCT 38.0 38.2 37.0  --  35.9*   PLT 49* 50* 66*  --  83*   MCV 79* 79* 79*  --  78*   RDW 15.6* 15.6* 15.5*  --  15.6*     --   --  138 139   K 4.5  --   --  4.4 3.9     --   --  108 109   CO2 21*  --   --  19* 23   BUN 14  --   --  17 22   CREATININE 0.9  --   --  0.9 1.0   GLU 94  --   --  76 90   PROT 7.5  --   --  7.1 7.0   ALBUMIN 3.6  --   --  3.5 3.4*   BILITOT 0.5  --   --  0.5 0.3   AST 34  --   --  27 25   ALKPHOS 86  --   --  81 85   ALT 9*  --   --  9* 7*     Laboratory Data Reviewed.     Microbiology Data Reviewed.     Other Results:  EKG (my interpretation): NSR with dynamic T wave changes  noted between different EKGs    Radiology Data Reviewed:   Pertinent Findings:  CXR: left sided reticular opacities     CTPE: left sided mass with surrounding inflammation. Hilar LA. No PE.    Current Medications:     Infusions:   heparin (porcine) in D5W 9 Units/kg/hr (05/20/23 0027)        Scheduled:   atorvastatin  20 mg Oral Daily    clopidogreL  75 mg Oral Daily    metoprolol succinate  25 mg Oral Daily        PRN:  acetaminophen, heparin (PORCINE), heparin (PORCINE), HYDROcodone-acetaminophen, promethazine    Assessment:     77 year old with history of NSC lung cancer, stage IV, PAD, DVT, pancreaittis presents with nausea, vomiting, and left arm/shoulder pain and found to have NSTEMI with trop peaking at 4.7. Cardiology and heme/onc consulted. Patient was started on heparin gtt for 48 hours. Given other comorbidities, patient is not candidate for PCI. Continue medical management.      Plan:     NSTEMI  - patient presented with chest pain radiating to left shoulder, left arm, and back, associated with nausea  - on presentation, troponin 4.5  - serial EKGs with dynamic T wave changes in anterolateral leads  - cardiology consulted, patient is not good candidate for PCI given significant comorbidities  - started on heparin gtt for 48 hrs  - plavix, statin, and BB  - will add on ACE/ARB as BP tolerates  - once heparin gtt completed, will discharge on Plavix and Eliquis    Chemotherapy induced thrombocytopenia  - Plt 45 on admission, lowest it has been recently  - consulted Dr. Mcqueen, who is her oncologist; states that it is okay for patient to be on heparin gtt  - will monitor plts and watch for neurological changes  - plts trending up slowly     Stage IV Adenocacinoma of Left Lung  Left Lung Inflammation  Possible metastasis to L1  - No cough, fever, shortness of breath to suggest pneumonia  - Hold chemo for now as possible adverse events is cardiomyopathy and thrombocytopenia  - Dr. 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  - Keep family at bedside as much as possible  - delirium precautions     Mixed anemia  Iron deficiency anemia  Anemia of chronic disease  - Hb 11.6, MCV 79  - Iron 35, TIBC 297, Sat 12, ferritin 315  - stable, CTM     CKD Stage I  - Cr stable  - Avoid nephrotoxic agents     Hyperlipidemia  - Start lipitor 10 QD     History of acute pancreatitis  - Monitor    Liyah Zuniga MD  [facility] Internal Medicine HO-I  [facility] Internal Medicine Team A    [facility] Medicine Hospitalist Pager numbers:   [facility] Hospitalist Medicine Team A (Danuta/vYonne): [phone]  [facility] Hospitalist Medicine Team B (Matthew/Hussein):  [phone]

## 2023-05-20 NOTE — SUBJECTIVE & OBJECTIVE
Interval History: Pt mentions that she is doing well this AM.  She denies cp and sob.  She is anxious to go home and is hoping that it is today.  There were no other reports overnight.      Review of Systems   Constitutional: Negative for diaphoresis and fever.   HENT:  Negative for congestion and hearing loss.    Eyes:  Negative for blurred vision and pain.   Cardiovascular:  Negative for chest pain, claudication, dyspnea on exertion, leg swelling, near-syncope, palpitations and syncope.   Respiratory:  Negative for shortness of breath and sleep disturbances due to breathing.    Hematologic/Lymphatic: Negative for bleeding problem. Does not bruise/bleed easily.   Skin:  Negative for color change and poor wound healing.   Gastrointestinal:  Negative for abdominal pain and nausea.   Genitourinary:  Negative for bladder incontinence and flank pain.   Neurological:  Negative for focal weakness and light-headedness.   Objective:     Vital Signs (Most Recent):  Temp: 98 °F (36.7 °C) (05/20/23 0739)  Pulse: 81 (05/20/23 1204)  Resp: 18 (05/20/23 0739)  BP: (!) 114/54 (05/20/23 0739)  SpO2: 98 % (05/20/23 0739) Vital Signs (24h Range):  Temp:  [96.1 °F (35.6 °C)-98.4 °F (36.9 °C)] 98 °F (36.7 °C)  Pulse:  [62-81] 81  Resp:  [18] 18  SpO2:  [97 %-98 %] 98 %  BP: ()/(47-62) 114/54     Weight: 61.2 kg (135 lb)  Body mass index is 17.81 kg/m².     SpO2: 98 %         Intake/Output Summary (Last 24 hours) at 5/20/2023 1243  Last data filed at 5/19/2023 1559  Gross per 24 hour   Intake --   Output 1 ml   Net -1 ml       Lines/Drains/Airways       None                      Physical Exam  Constitutional:       Appearance: She is well-developed. She is ill-appearing. She is not diaphoretic.   HENT:      Head: Normocephalic and atraumatic.   Eyes:      General: No scleral icterus.     Pupils: Pupils are equal, round, and reactive to light.   Neck:      Vascular: No JVD.   Cardiovascular:      Rate and Rhythm: Normal rate and  regular rhythm.      Pulses: Intact distal pulses.      Heart sounds: S1 normal and S2 normal. No murmur heard.    No friction rub. No gallop.   Pulmonary:      Effort: Pulmonary effort is normal. No respiratory distress.      Breath sounds: Normal breath sounds. No wheezing or rales.   Chest:      Chest wall: No tenderness.   Abdominal:      General: Bowel sounds are normal. There is no distension.      Palpations: Abdomen is soft. There is no mass.      Tenderness: There is no abdominal tenderness. There is no rebound.   Musculoskeletal:         General: No tenderness. Normal range of motion.      Cervical back: Normal range of motion and neck supple.      Right lower leg: No edema.      Left lower leg: No edema.   Skin:     General: Skin is warm and dry.      Coloration: Skin is not pale.   Neurological:      Mental Status: She is alert and oriented to person, place, and time.      Coordination: Coordination normal.      Deep Tendon Reflexes: Reflexes normal.   Psychiatric:         Behavior: Behavior normal.         Judgment: Judgment normal.          Significant Labs: BMP:   Recent Labs   Lab 05/19/23  0528 05/20/23  0430   GLU 76 90    139   K 4.4 3.9    109   CO2 19* 23   BUN 17 22   CREATININE 0.9 1.0   CALCIUM 9.5 9.2   MG 2.3 2.3   , CMP   Recent Labs   Lab 05/19/23  0528 05/20/23  0430    139   K 4.4 3.9    109   CO2 19* 23   GLU 76 90   BUN 17 22   CREATININE 0.9 1.0   CALCIUM 9.5 9.2   PROT 7.1 7.0   ALBUMIN 3.5 3.4*   BILITOT 0.5 0.3   ALKPHOS 81 85   AST 27 25   ALT 9* 7*   ANIONGAP 11 7*   , CBC   Recent Labs   Lab 05/19/23  0048 05/20/23  0430   WBC 5.73 5.35   HGB 11.6* 11.2*   HCT 37.0 35.9*   PLT 66* 83*   , INR No results for input(s): INR, PROTIME in the last 48 hours., Lipid Panel No results for input(s): CHOL, HDL, LDLCALC, TRIG, CHOLHDL in the last 48 hours., Troponin No results for input(s): TROPONINI in the last 48 hours., and All pertinent lab results from the last  24 hours have been reviewed.    Significant Imaging: Echocardiogram: 2D echo with color flow doppler: No results found for this or any previous visit. and Transthoracic echo (TTE) complete (Cupid Only):   Results for orders placed or performed during the hospital encounter of 05/17/23   Echo   Result Value Ref Range    BSA 1.78 m2    TDI SEPTAL 0.07 m/s    LV LATERAL E/E' RATIO 9.88 m/s    LV SEPTAL E/E' RATIO 11.29 m/s    LA WIDTH 3.43 cm    IVC diameter 1.65 cm    Left Ventricular Outflow Tract Mean Velocity 0.62 cm/s    Left Ventricular Outflow Tract Mean Gradient 1.95 mmHg    TDI LATERAL 0.08 m/s    LVIDd 4.89 3.5 - 6.0 cm    IVS 0.97 0.6 - 1.1 cm    Posterior Wall 0.91 0.6 - 1.1 cm    LVIDs 3.04 2.1 - 4.0 cm    FS 38 28 - 44 %    LV mass 161.47 g    LA size 3.27 cm    RV S' 0.02 cm/s    Left Ventricle Relative Wall Thickness 0.37 cm    AV mean gradient 4 mmHg    AV valve area 2.02 cm2    AV Velocity Ratio 0.72     AV index (prosthetic) 0.80     MV mean gradient 2 mmHg    MV valve area p 1/2 method 3.43 cm2    MV valve area by continuity eq 1.74 cm2    E/A ratio 0.94     Mean e' 0.08 m/s    E wave deceleration time 195.63 msec    Pulm vein S/D ratio 1.44     LVOT diameter 1.79 cm    LVOT area 2.5 cm2    LVOT peak jcarlos 1.15 m/s    LVOT peak VTI 20.50 cm    Ao peak jcarlos 1.60 m/s    Ao VTI 25.5 cm    Mr max jcarlos 4.90 m/s    LVOT stroke volume 51.56 cm3    AV peak gradient 10 mmHg    MV peak gradient 4 mmHg    E/E' ratio 10.53 m/s    MV Peak E Jcarlos 0.79 m/s    TR Max Jcarlos 2.37 m/s    MV VTI 29.6 cm    MV stenosis pressure 1/2 time 64.14 ms    MV Peak A Jcarlos 0.84 m/s    PV Peak S Jcarlos 0.52 m/s    PV Peak D Jcarlos 0.36 m/s    LV Systolic Volume 36.18 mL    LV Systolic Volume Index 19.9 mL/m2    LV Diastolic Volume 112.09 mL    LV Diastolic Volume Index 61.59 mL/m2    LV Mass Index 89 g/m2    RA Major Axis 4.62 cm    Left Atrium Major Axis 5.11 cm    Triscuspid Valve Regurgitation Peak Gradient 22 mmHg    Pappas's Biplane MOD  Ejection Fraction 7 %    EF 55 %    Right Atrial Pressure (from IVC) 3 mmHg    TV rest pulmonary artery pressure 25 mmHg    Narrative    · The left ventricle is normal in size with normal systolic function.  · The estimated ejection fraction is 55%.  · Normal left ventricular diastolic function.  · Mild mitral regurgitation.  · The estimated PA systolic pressure is 25 mmHg.  · Normal right ventricular size with normal right ventricular systolic   function.  · Normal central venous pressure (3 mmHg).  · There are segmental left ventricular wall motion abnormalities.       and EKG: reviewed.

## 2023-05-20 NOTE — NURSING
AVS reviewed with patient's daughter and she verbalized complete understanding on new medications, diet, activity, signs and symptoms when to call the MD, and follow-up visits.  Patient received medications from Outpatient Pharmacy yesterday.  Daughter is at bedside and will accompany patient home.  Voiced no concerns.  Wheelchair per Transport placed.

## 2023-05-20 NOTE — PLAN OF CARE
Problem: Adult Inpatient Plan of Care  Goal: Plan of Care Review  Outcome: Ongoing, Progressing     AAOx4. VS on room air. Bed locked, bed in low position, and call light within reach. Will continue to monitor.

## 2023-05-20 NOTE — PLAN OF CARE
Problem: Adult Inpatient Plan of Care  Goal: Plan of Care Review  Outcome: Ongoing, Progressing     Vital signs, labs, progress notes and plan of care reviewed.

## 2023-05-20 NOTE — ASSESSMENT & PLAN NOTE
- symptoms concerning for ischemic etiology   - troponin peaked at  5 and down to 4.6; EKG SR; echo with NSR  - remains  CP free   - cleared by Hem Onc and on IV Heparin drip; recommend IV Heparin for total x48 hours and then may transition to DOAC   - continue Plavix, BB, statin  - echo with normal LVEF TTE pending   - continue medical management given comorbidities- Discussed with patient and daughter this AM    - o/p cardiology f/u

## 2023-05-20 NOTE — PLAN OF CARE
Pt will dc with no needs noted. Pts daughter is at bedside and will provide transport home. No cm needs at this time.     Cleared from CM . Bedside Nurse and VN notified.    SW requested cardi follow up    Future Appointments   Date Time Provider Department Center   6/12/2023 10:00 AM Taylor Sadler MD [facility] IMPRI [facility]   7/17/2023  8:50 AM LAB, [facility] RVPH LAB [facility]   7/24/2023  1:00 PM Garry Mcqueen MD [facility] HEM ONC [facility]        05/20/23 1136   Final Note   Assessment Type Final Discharge Note   Anticipated Discharge Disposition Home   Hospital Resources/Appts/Education Provided Appointments scheduled by Navigator/Coordinator   Post-Acute Status   Discharge Delays None known at this time

## 2023-05-20 NOTE — DISCHARGE SUMMARY
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    Pancreatic mass    Chemotherapy-induced nausea and vomiting    HLD (hyperlipidemia)    Adenocarcinoma of left lung, stage 4 EGFR positive    Cancer    Chemotherapy-induced thrombocytopenia    Mild protein-calorie malnutrition    PVD (peripheral vascular disease)    Acute deep vein thrombosis (DVT) of left femoral vein    Generalized osteoarthritis of multiple sites    Pain and swelling of lower leg, left    Thrombocytopenia    NSTEMI (non-ST elevated myocardial infarction)    Microcytic anemia       Consultants and Procedures     Consultants:  Cardiology    Procedures:   None    Imaging:  CXR: left sided reticular opacities     CTPE: left sided mass with surrounding inflammation. Hilar LA. No PE.    Brief History of Present Illness      77 year old with history of NSC lung cancer, stage IV, PAD, DVT, pancreaittis presents with nausea, vomiting, and left arm/shoulder pain and found to have NSTEMI with trop peaking at 4.7. Cardiology and heme/onc consulted. Patient was started on heparin gtt for 48 hours. Given other comorbidities, patient is not candidate for PCI. Continue medical management.     For the full HPI please refer to the History & Physical from this admission.    Hospital Course By Problem with Pertinent Findings     NSTEMI  - patient presented with chest pain radiating to left shoulder, left arm, and back, associated with nausea  - on presentation, troponin 4.5  - serial EKGs with dynamic T wave changes in anterolateral leads  - cardiology consulted, patient is not good candidate for PCI given significant comorbidities  - started on heparin gtt for 48  hrs, completed 5/20  - plavix, statin, and BB  - add on ACE/ARB as BP tolerates outpatient  - heparin gtt completed, discharged on Plavix and Eliquis     Chemotherapy induced thrombocytopenia  - Plt 45 on admission, lowest it has been recently  - consulted Dr. Mcqueen, who is her oncologist; states that it is okay for patient to be on heparin gtt  - will monitor plts and watch for neurological changes  - plts trended up slowly     Stage IV Adenocacinoma of Left Lung  Left Lung Inflammation  Possible metastasis to L1  - No cough, fever, shortness of breath to suggest pneumonia  - Hold chemo for now as possible adverse events is cardiomyopathy and thrombocytopenia  - Dr. Mcqueen with heme/onc following - appt 07/24  - per Dr. 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     clopidogreL 75 mg tablet  Commonly known as: PLAVIX  Take 1 tablet (75 mg total) by mouth once daily.     metoprolol succinate 25 MG 24 hr tablet  Commonly known as: TOPROL-XL  Take 1 tablet (25 mg total) by mouth once daily.            CONTINUE taking these medications      acetaminophen 500 MG tablet  Commonly known as: TYLENOL     apixaban 5 mg Tab  Commonly known as: ELIQUIS  Take 1 tablet (5 mg total) by mouth 2 (two) times  daily.     diclofenac sodium 1 % Gel  Commonly known as: VOLTAREN     HYDROcodone-acetaminophen  mg per tablet  Commonly known as: NORCO  Take 1 tablet by mouth every 6 (six) hours as needed (chronic pain). Take 1/2 tablet by mouth in the morning and 1/2 tablet by mouth in the evening as needed     promethazine 25 MG tablet  Commonly known as: PHENERGAN  Take 1 tablet (25 mg total) by mouth every 6 (six) hours as needed for Nausea.     SALONPAS (CAPSAICIN-MENTHOL) 0.025-1.25 % Ptmd  Generic drug: capsaicin-menthol     scopolamine 1.3-1.5 mg (1 mg over 3 days)  Commonly known as: TRANSDERM-SCOP  Place 1 patch onto the skin every 72 hours.     TAGRISSO 40 mg Tab  Generic drug: osimertinib  Take 1 tablet (40 mg) by mouth once daily.               Where to Get Your Medications        [facility]  [address] [address], [place] [address]      Hours: Mon-Fri, 8a-5:30p Phone: [phone]   atorvastatin 40 MG tablet  clopidogreL 75 mg tablet  metoprolol succinate 25 MG 24 hr tablet         Discharge Information:   Diet:  Cardiac diet    Physical Activity:  As tolerated             Instructions:  1. Take all medications as prescribed  2. Keep all follow-up appointments  3. Return to the hospital or call your primary care physicians if any worsening symptoms such as fever, chest pain, shortness of breath, return of symptoms, or any other concerns.    Follow-Up Appointments:  Referral placed to cardiology for follow up from hospital stay  PCP appt on 6/12  Heme/Onc appt 7/24/23    Liyah Zuniga MD  [facility] Internal Medicine, HO-1

## 2023-05-20 NOTE — PROGRESS NOTES
[facility] - Telemetry  Cardiology  Progress Note    Patient Name: Nathan Simpson  MRN: [id number]  Admission Date: 5/17/2023  Hospital Length of Stay: 2 days  Code Status: Full Code   Attending Physician: Salma Russell MD   Primary Care Physician: Edilia Carrillo MD  Expected Discharge Date: 5/20/2023  Principal Problem:NSTEMI (non-ST elevated myocardial infarction)    Subjective:     Hospital Course:   05/18/2023 Per HPI   5/19/2023 HR and BP stalbe. H&H unchanged; Plts 66K with trends as follows 50K-49K-45K BMP WNL. Echo with normal LVEF  Troponin 4.6-4.7-5.1      Interval History: Pt mentions that she is doing well this AM.  She denies cp and sob.  She is anxious to go home and is hoping that it is today.  There were no other reports overnight.      Review of Systems   Constitutional: Negative for diaphoresis and fever.   HENT:  Negative for congestion and hearing loss.    Eyes:  Negative for blurred vision and pain.   Cardiovascular:  Negative for chest pain, claudication, dyspnea on exertion, leg swelling, near-syncope, palpitations and syncope.   Respiratory:  Negative for shortness of breath and sleep disturbances due to breathing.    Hematologic/Lymphatic: Negative for bleeding problem. Does not bruise/bleed easily.   Skin:  Negative for color change and poor wound healing.   Gastrointestinal:  Negative for abdominal pain and nausea.   Genitourinary:  Negative for bladder incontinence and flank pain.   Neurological:  Negative for focal weakness and light-headedness.   Objective:     Vital Signs (Most Recent):  Temp: 98 °F (36.7 °C) (05/20/23 0739)  Pulse: 81 (05/20/23 1204)  Resp: 18 (05/20/23 0739)  BP: (!) 114/54 (05/20/23 0739)  SpO2: 98 % (05/20/23 0739) Vital Signs (24h Range):  Temp:  [96.1 °F (35.6 °C)-98.4 °F (36.9 °C)] 98 °F (36.7 °C)  Pulse:  [62-81] 81  Resp:  [18] 18  SpO2:  [97 %-98 %] 98 %  BP: ()/(47-62) 114/54     Weight: 61.2 kg (135 lb)  Body mass index is 17.81 kg/m².     SpO2: 98 %          Intake/Output Summary (Last 24 hours) at 5/20/2023 1243  Last data filed at 5/19/2023 1559  Gross per 24 hour   Intake --   Output 1 ml   Net -1 ml       Lines/Drains/Airways       None                      Physical Exam  Constitutional:       Appearance: She is well-developed. She is ill-appearing. She is not diaphoretic.   HENT:      Head: Normocephalic and atraumatic.   Eyes:      General: No scleral icterus.     Pupils: Pupils are equal, round, and reactive to light.   Neck:      Vascular: No JVD.   Cardiovascular:      Rate and Rhythm: Normal rate and regular rhythm.      Pulses: Intact distal pulses.      Heart sounds: S1 normal and S2 normal. No murmur heard.    No friction rub. No gallop.   Pulmonary:      Effort: Pulmonary effort is normal. No respiratory distress.      Breath sounds: Normal breath sounds. No wheezing or rales.   Chest:      Chest wall: No tenderness.   Abdominal:      General: Bowel sounds are normal. There is no distension.      Palpations: Abdomen is soft. There is no mass.      Tenderness: There is no abdominal tenderness. There is no rebound.   Musculoskeletal:         General: No tenderness. Normal range of motion.      Cervical back: Normal range of motion and neck supple.      Right lower leg: No edema.      Left lower leg: No edema.   Skin:     General: Skin is warm and dry.      Coloration: Skin is not pale.   Neurological:      Mental Status: She is alert and oriented to person, place, and time.      Coordination: Coordination normal.      Deep Tendon Reflexes: Reflexes normal.   Psychiatric:         Behavior: Behavior normal.         Judgment: Judgment normal.          Significant Labs: BMP:   Recent Labs   Lab 05/19/23  0528 05/20/23  0430   GLU 76 90    139   K 4.4 3.9    109   CO2 19* 23   BUN 17 22   CREATININE 0.9 1.0   CALCIUM 9.5 9.2   MG 2.3 2.3   , CMP   Recent Labs   Lab 05/19/23  0528 05/20/23  0430    139   K 4.4 3.9    109   CO2 19* 23    GLU 76 90   BUN 17 22   CREATININE 0.9 1.0   CALCIUM 9.5 9.2   PROT 7.1 7.0   ALBUMIN 3.5 3.4*   BILITOT 0.5 0.3   ALKPHOS 81 85   AST 27 25   ALT 9* 7*   ANIONGAP 11 7*   , CBC   Recent Labs   Lab 05/19/23  0048 05/20/23  0430   WBC 5.73 5.35   HGB 11.6* 11.2*   HCT 37.0 35.9*   PLT 66* 83*   , INR No results for input(s): INR, PROTIME in the last 48 hours., Lipid Panel No results for input(s): CHOL, HDL, LDLCALC, TRIG, CHOLHDL in the last 48 hours., Troponin No results for input(s): TROPONINI in the last 48 hours., and All pertinent lab results from the last 24 hours have been reviewed.    Significant Imaging: Echocardiogram: 2D echo with color flow doppler: No results found for this or any previous visit. and Transthoracic echo (TTE) complete (Cupid Only):   Results for orders placed or performed during the hospital encounter of 05/17/23   Echo   Result Value Ref Range    BSA 1.78 m2    TDI SEPTAL 0.07 m/s    LV LATERAL E/E' RATIO 9.88 m/s    LV SEPTAL E/E' RATIO 11.29 m/s    LA WIDTH 3.43 cm    IVC diameter 1.65 cm    Left Ventricular Outflow Tract Mean Velocity 0.62 cm/s    Left Ventricular Outflow Tract Mean Gradient 1.95 mmHg    TDI LATERAL 0.08 m/s    LVIDd 4.89 3.5 - 6.0 cm    IVS 0.97 0.6 - 1.1 cm    Posterior Wall 0.91 0.6 - 1.1 cm    LVIDs 3.04 2.1 - 4.0 cm    FS 38 28 - 44 %    LV mass 161.47 g    LA size 3.27 cm    RV S' 0.02 cm/s    Left Ventricle Relative Wall Thickness 0.37 cm    AV mean gradient 4 mmHg    AV valve area 2.02 cm2    AV Velocity Ratio 0.72     AV index (prosthetic) 0.80     MV mean gradient 2 mmHg    MV valve area p 1/2 method 3.43 cm2    MV valve area by continuity eq 1.74 cm2    E/A ratio 0.94     Mean e' 0.08 m/s    E wave deceleration time 195.63 msec    Pulm vein S/D ratio 1.44     LVOT diameter 1.79 cm    LVOT area 2.5 cm2    LVOT peak leanne 1.15 m/s    LVOT peak VTI 20.50 cm    Ao peak leanne 1.60 m/s    Ao VTI 25.5 cm    Mr max leanne 4.90 m/s    LVOT stroke volume 51.56 cm3    AV  peak gradient 10 mmHg    MV peak gradient 4 mmHg    E/E' ratio 10.53 m/s    MV Peak E Jcarlos 0.79 m/s    TR Max Jcarlos 2.37 m/s    MV VTI 29.6 cm    MV stenosis pressure 1/2 time 64.14 ms    MV Peak A Jcarlos 0.84 m/s    PV Peak S Jcarlos 0.52 m/s    PV Peak D Jcarlos 0.36 m/s    LV Systolic Volume 36.18 mL    LV Systolic Volume Index 19.9 mL/m2    LV Diastolic Volume 112.09 mL    LV Diastolic Volume Index 61.59 mL/m2    LV Mass Index 89 g/m2    RA Major Axis 4.62 cm    Left Atrium Major Axis 5.11 cm    Triscuspid Valve Regurgitation Peak Gradient 22 mmHg    Pappas's Biplane MOD Ejection Fraction 7 %    EF 55 %    Right Atrial Pressure (from IVC) 3 mmHg    TV rest pulmonary artery pressure 25 mmHg    Narrative    · The left ventricle is normal in size with normal systolic function.  · The estimated ejection fraction is 55%.  · Normal left ventricular diastolic function.  · Mild mitral regurgitation.  · The estimated PA systolic pressure is 25 mmHg.  · Normal right ventricular size with normal right ventricular systolic   function.  · Normal central venous pressure (3 mmHg).  · There are segmental left ventricular wall motion abnormalities.       and EKG: reviewed.      Assessment and Plan:       * NSTEMI (non-ST elevated myocardial infarction)  - symptoms concerning for ischemic etiology   - troponin peaked at  5 and down to 4.6; EKG SR; echo with NSR  - remains  CP free   - cleared by Hem Onc and on IV Heparin drip; recommend IV Heparin for total x48 hours and then may transition to DOAC   - continue Plavix, BB, statin  - echo with normal LVEF TTE pending   - continue medical management given comorbidities- Discussed with patient and daughter this AM    - o/p cardiology f/u    Thrombocytopenia  - Plts 66K trended up to 50K-49K  - Hem Onc consulted with etiology related to CA treatment as well as acute issues  - will continue antiplatelet therapy     Acute deep vein thrombosis (DVT) of left femoral vein  - Left CFV, POP, GSV DVT  diagnosed in 04/2023  - resume home Eliquis- will need lifelong OAC per Oncology   - ASA as detailed under NSTEMI     PVD (peripheral vascular disease)  Continue medical therapy.      Adenocarcinoma of left lung, stage 4 EGFR positive  - followed by Dr Mcqueen   - Hem Onc consulted with recs noted         VTE Risk Mitigation (From admission, onward)         Ordered     heparin 25,000 units in dextrose 5% (100 units/ml) IV bolus from bag - ADDITIONAL PRN BOLUS - 60 units/kg (max bolus 4000 units)  As needed (PRN)        Question:  Heparin Infusion Adjustment (DO NOT MODIFY ANSWER)  Answer:  [email or web address]\[email or web address]\Images\Pharmacy\HeparinInfusions\heparin LOW INTENSITY nomogram for [facility] [id number].pdf    05/18/23 1044     heparin 25,000 units in dextrose 5% (100 units/ml) IV bolus from bag - ADDITIONAL PRN BOLUS - 30 units/kg (max bolus 4000 units)  As needed (PRN)        Question:  Heparin Infusion Adjustment (DO NOT MODIFY ANSWER)  Answer:  \\[email or web address]\epic\Images\Pharmacy\HeparinInfusions\heparin LOW INTENSITY nomogram for [facility] [id number].pdf    05/18/23 1044     heparin 25,000 units in dextrose 5% 250 mL (100 units/mL) infusion LOW INTENSITY nomogram - [facility]  Continuous        Question Answer Comment   Heparin Infusion Adjustment (DO NOT MODIFY ANSWER) \\[email or web address]\epic\Images\Pharmacy\HeparinInfusions\heparin LOW INTENSITY nomogram for [facility] [id number].pdf    Begin at (in units/kg/hr) 12        05/18/23 1044                Jose De Leon III, MD  Cardiology  [facility] - Telemetry

## 2023-05-23 ENCOUNTER — SPECIALTY PHARMACY (OUTPATIENT)
Dept: PHARMACY | Facility: CLINIC | Age: 78
End: 2023-05-23
Payer: MEDICARE

## 2023-05-23 ENCOUNTER — PATIENT OUTREACH (OUTPATIENT)
Dept: ADMINISTRATIVE | Facility: OTHER | Age: 78
End: 2023-05-23
Payer: MEDICARE

## 2023-05-23 NOTE — PROGRESS NOTES
IP Liaison - Final Visit Note    Patient: Nathan Simpson  MRN:  [id number]  Date of Service:  5/23/2023  Completed by:  LETICIA Zee    Reason for Visit   Patient presents with    IP Liaison Chart Review     Patient discharged from hospital before RSW was able to complete follow-up visit.        Patient Summary     Discharge Date: 05/20/2023  Discharge telephone number/address: [phone] / [address] [facility] [address]  Follow up provider: Taylor Sadler MD  Follow up appointments: 6/12/2023 @ 10:00am  Home Health agency & telephone number: n/a  DME ordered &  name: n/a  Assigned OPCM RN/SW: n/a  Report sent to follow up team (PCP/OPCM) via in basket message: n/a  Community Resources provided including agency name & contact info: Medicaid PCA sitter information      LETICIA Zee

## 2023-05-23 NOTE — TELEPHONE ENCOUNTER
Specialty Pharmacy - Clinical Reassessment    Specialty Medication Orders Linked to Encounter      Flowsheet Row Most Recent Value   Medication #1 osimertinib (TAGRISSO) 40 mg Tab (Order#[id number], Rx#[id number])          Patient Diagnosis   C34.92 - Adenocarcinoma of left lung, stage 4    Ethyl M Calvin is a 77 y.o. female, who is followed by the specialty pharmacy service for management and education of her Tagrisso.  She has been on therapy with Tagrisso since 6/5/2021. started osimertinib 80 mg QD, due to side effects the dose is decreased to 40 mg  I have reviewed her electronic medical record and current medication list and determined that specialty medication adjustment Is not needed at this time.    Patient has not experienced adverse events.  She Is adherent reporting 0 missed doses since last review.  Adherence has been encouraged with the following mechanism(s): directed education.  She is meeting goals of therapy and will continue treatment.        5/17/2023 4/17/2023 3/17/2023 2/14/2023 1/17/2023 12/16/2022 11/14/2022   Follow Up Review   # of missed doses 0 0 0 0 0 0 0   New Medications? No Yes No No No No No   New Conditions? No Yes No No No No No   New Allergies? No No No No No No No   Med Effective? Good Good Good Good Good Good Good   Urgent Care? No Yes No No No No No   Requested Pharmacist? No Yes No No No No No         Therapy is appropriate to continue.    Therapy is effective: Yes  On scale of 1 to 10, how does patient rank quality of life? (10 - Best): Unable to Assess  Recommendations: none at this time.  Review Method: Chart Review      4/10 follow up indicates MRI brain (1/23/23) was negative/stable.CT chest/abdomen/pelvis (3/20/23) revealed stable disease. Patient will continue Tagrisso. Reviewed 5/20 labs: plts were 83K, grade 1. CMP unremarkable. Qtc: 452 mms, no adjustments needed    On 5/17 she was admitted for NSTEMI, she held Tagrisso while admitted and according to notes, restarted  at discharge. Patient has another follow up with Dr. Mcqueen on 7/24/2023.     Tasks added this encounter   No tasks added.   Tasks due within next 3 months   5/21/2023 - Clinical Assessment (6 month recurrence)  6/10/2023 - Refill Coordination Outreach (1 time occurrence)     TONY GUPTA, PharmD  [facility]  [address]  [facility] [address]  Phone: [phone]  Fax: [phone]

## 2023-05-31 ENCOUNTER — TELEPHONE (OUTPATIENT)
Dept: HEMATOLOGY/ONCOLOGY | Facility: CLINIC | Age: 78
End: 2023-05-31
Payer: MEDICARE

## 2023-05-31 NOTE — TELEPHONE ENCOUNTER
----- Message from Maria R Ahn sent at 5/31/2023 11:24 AM CDT -----  .Type:  Needs Medical Advice    Who Called: pt    Would the patient rather a call back or a response via [facility]? Call back  Best Call Back Number: [phone]  Additional Information:     Pt daughter stated she would like a call back because her mom had a stroke Sunday and she is at [facility] and would like a call back please

## 2023-05-31 NOTE — TELEPHONE ENCOUNTER
Pt's daughter stated pt had a stroke on Sunday and is at [facility]. She wanted to know what do Dr. Mcqueen advised regarding chemo medication and eliquis/ plavix because pt is currently not taking chemo med due to being in hospital. Informed pt's daughter that I will let Dr Mcqueen know. Confirmed dr cisse for 6/8/23.

## 2023-06-01 ENCOUNTER — TELEPHONE (OUTPATIENT)
Dept: HEMATOLOGY/ONCOLOGY | Facility: CLINIC | Age: 78
End: 2023-06-01
Payer: MEDICARE

## 2023-06-01 NOTE — TELEPHONE ENCOUNTER
----- Message from Kacie Layton sent at 6/1/2023  8:49 AM CDT -----  Type:  Patient Returning Call    Who Called: pt  Who Left Message for Patient:Zahra  Does the patient know what this is regarding?: Fidelina   Would the patient rather a call back or a response via [facility]? call  Best Call Back Number:[phone] (M)   Additional Information:

## 2023-06-07 ENCOUNTER — HOSPITAL ENCOUNTER (EMERGENCY)
Facility: HOSPITAL | Age: 78
Discharge: HOME | End: 2023-06-07
Attending: STUDENT IN AN ORGANIZED HEALTH CARE EDUCATION/TRAINING PROGRAM
Payer: MEDICARE

## 2023-06-07 VITALS
SYSTOLIC BLOOD PRESSURE: 124 MMHG | HEART RATE: 78 BPM | OXYGEN SATURATION: 98 % | TEMPERATURE: 99 F | RESPIRATION RATE: 17 BRPM | DIASTOLIC BLOOD PRESSURE: 59 MMHG

## 2023-06-07 DIAGNOSIS — R47.81 SLURRED SPEECH: Primary | ICD-10-CM

## 2023-06-07 DIAGNOSIS — N30.01 ACUTE CYSTITIS WITH HEMATURIA: ICD-10-CM

## 2023-06-07 DIAGNOSIS — R53.1 LEFT-SIDED WEAKNESS: ICD-10-CM

## 2023-06-07 PROBLEM — Z86.73 CHRONIC ISCHEMIC RIGHT MCA STROKE: Status: ACTIVE | Noted: 2023-06-07

## 2023-06-07 LAB
ALBUMIN SERPL BCP-MCNC: 3.2 G/DL (ref 3.5–5.2)
ALP SERPL-CCNC: 128 U/L (ref 38–126)
ALT SERPL W/O P-5'-P-CCNC: 38 U/L (ref 10–44)
ANION GAP SERPL CALC-SCNC: 8 MMOL/L (ref 8–16)
AST SERPL-CCNC: 123 U/L (ref 15–46)
BACTERIA #/AREA URNS AUTO: ABNORMAL /HPF
BASOPHILS # BLD AUTO: 0.02 K/UL (ref 0–0.2)
BASOPHILS NFR BLD: 0.2 % (ref 0–1.9)
BILIRUB SERPL-MCNC: 0.5 MG/DL (ref 0.1–1)
BILIRUB UR QL STRIP: NEGATIVE
CALCIUM SERPL-MCNC: 7.6 MG/DL (ref 8.7–10.5)
CHLORIDE SERPL-SCNC: 114 MMOL/L (ref 95–110)
CHOLEST SERPL-MCNC: 154 MG/DL (ref 120–199)
CHOLEST/HDLC SERPL: 5.1 {RATIO} (ref 2–5)
CLARITY UR REFRACT.AUTO: CLEAR
CO2 SERPL-SCNC: 22 MMOL/L (ref 23–29)
COLOR UR AUTO: YELLOW
CREAT SERPL-MCNC: 0.74 MG/DL (ref 0.5–1.4)
DIFFERENTIAL METHOD: ABNORMAL
EOSINOPHIL # BLD AUTO: 0.3 K/UL (ref 0–0.5)
EOSINOPHIL NFR BLD: 3.5 % (ref 0–8)
ERYTHROCYTE [DISTWIDTH] IN BLOOD BY AUTOMATED COUNT: 16.6 % (ref 11.5–14.5)
EST. GFR  (NO RACE VARIABLE): >60 ML/MIN/1.73 M^2
GLUCOSE SERPL-MCNC: 81 MG/DL (ref 70–110)
GLUCOSE SERPL-MCNC: 85 MG/DL (ref 70–110)
GLUCOSE UR QL STRIP: NEGATIVE
HCT VFR BLD AUTO: 32.9 % (ref 37–48.5)
HDLC SERPL-MCNC: 30 MG/DL (ref 40–75)
HDLC SERPL: 19.5 % (ref 20–50)
HGB BLD-MCNC: 10.1 G/DL (ref 12–16)
HGB UR QL STRIP: ABNORMAL
HYALINE CASTS UR QL AUTO: 0 /LPF
IMM GRANULOCYTES # BLD AUTO: 0.05 K/UL (ref 0–0.04)
IMM GRANULOCYTES NFR BLD AUTO: 0.6 % (ref 0–0.5)
INR PPP: 1.2 (ref 0.8–1.2)
KETONES UR QL STRIP: ABNORMAL
LDLC SERPL CALC-MCNC: 98 MG/DL (ref 63–159)
LEUKOCYTE ESTERASE UR QL STRIP: ABNORMAL
LYMPHOCYTES # BLD AUTO: 1.8 K/UL (ref 1–4.8)
LYMPHOCYTES NFR BLD: 22.4 % (ref 18–48)
MCH RBC QN AUTO: 24.4 PG (ref 27–31)
MCHC RBC AUTO-ENTMCNC: 30.7 G/DL (ref 32–36)
MCV RBC AUTO: 80 FL (ref 82–98)
MICROSCOPIC COMMENT: ABNORMAL
MONOCYTES # BLD AUTO: 0.7 K/UL (ref 0.3–1)
MONOCYTES NFR BLD: 8.2 % (ref 4–15)
NEUTROPHILS # BLD AUTO: 5.2 K/UL (ref 1.8–7.7)
NEUTROPHILS NFR BLD: 65.1 % (ref 38–73)
NITRITE UR QL STRIP: NEGATIVE
NONHDLC SERPL-MCNC: 124 MG/DL
NRBC BLD-RTO: 0 /100 WBC
PH UR STRIP: 8.5 [PH] (ref 5–8)
PLATELET # BLD AUTO: 114 K/UL (ref 150–450)
PMV BLD AUTO: ABNORMAL FL (ref 9.2–12.9)
POCT GLUCOSE: 85 MG/DL (ref 70–110)
POTASSIUM SERPL-SCNC: 3.7 MMOL/L (ref 3.5–5.1)
PROT SERPL-MCNC: 6.3 G/DL (ref 6–8.4)
PROT UR QL STRIP: ABNORMAL
PROTHROMBIN TIME: 12.9 SEC (ref 9–12.5)
RBC # BLD AUTO: 4.14 M/UL (ref 4–5.4)
RBC #/AREA URNS AUTO: 40 /HPF (ref 0–4)
SODIUM SERPL-SCNC: 144 MMOL/L (ref 136–145)
SP GR UR STRIP: 1.02 (ref 1–1.03)
TRIGL SERPL-MCNC: 130 MG/DL (ref 30–150)
TSH SERPL DL<=0.005 MIU/L-ACNC: 0.94 UIU/ML (ref 0.4–4)
URN SPEC COLLECT METH UR: ABNORMAL
UROBILINOGEN UR STRIP-ACNC: >=8 EU/DL
UUN UR-MCNC: 11 MG/DL (ref 7–17)
WBC # BLD AUTO: 8.04 K/UL (ref 3.9–12.7)
WBC #/AREA URNS AUTO: 25 /HPF (ref 0–5)

## 2023-06-07 PROCEDURE — 96360 HYDRATION IV INFUSION INIT: CPT | Mod: ER

## 2023-06-07 PROCEDURE — 94760 N-INVAS EAR/PLS OXIMETRY 1: CPT | Mod: ER

## 2023-06-07 PROCEDURE — 63600175 PHARM REV CODE 636 W HCPCS: Mod: ER | Performed by: STUDENT IN AN ORGANIZED HEALTH CARE EDUCATION/TRAINING PROGRAM

## 2023-06-07 PROCEDURE — 93010 ELECTROCARDIOGRAM REPORT: CPT | Mod: ,,, | Performed by: INTERNAL MEDICINE

## 2023-06-07 PROCEDURE — 99900035 HC TECH TIME PER 15 MIN (STAT): Mod: ER

## 2023-06-07 PROCEDURE — 80061 LIPID PANEL: CPT | Performed by: STUDENT IN AN ORGANIZED HEALTH CARE EDUCATION/TRAINING PROGRAM

## 2023-06-07 PROCEDURE — 87088 URINE BACTERIA CULTURE: CPT | Mod: ER | Performed by: STUDENT IN AN ORGANIZED HEALTH CARE EDUCATION/TRAINING PROGRAM

## 2023-06-07 PROCEDURE — 80053 COMPREHEN METABOLIC PANEL: CPT | Mod: ER | Performed by: STUDENT IN AN ORGANIZED HEALTH CARE EDUCATION/TRAINING PROGRAM

## 2023-06-07 PROCEDURE — 85025 COMPLETE CBC W/AUTO DIFF WBC: CPT | Mod: ER | Performed by: STUDENT IN AN ORGANIZED HEALTH CARE EDUCATION/TRAINING PROGRAM

## 2023-06-07 PROCEDURE — G0427 PR INPT TELEHEALTH CON 70/>M: ICD-10-PCS | Mod: 95,,, | Performed by: PSYCHIATRY & NEUROLOGY

## 2023-06-07 PROCEDURE — 84443 ASSAY THYROID STIM HORMONE: CPT | Mod: ER | Performed by: STUDENT IN AN ORGANIZED HEALTH CARE EDUCATION/TRAINING PROGRAM

## 2023-06-07 PROCEDURE — 87086 URINE CULTURE/COLONY COUNT: CPT | Mod: ER | Performed by: STUDENT IN AN ORGANIZED HEALTH CARE EDUCATION/TRAINING PROGRAM

## 2023-06-07 PROCEDURE — 85610 PROTHROMBIN TIME: CPT | Mod: ER | Performed by: STUDENT IN AN ORGANIZED HEALTH CARE EDUCATION/TRAINING PROGRAM

## 2023-06-07 PROCEDURE — 93005 ELECTROCARDIOGRAM TRACING: CPT | Mod: ER

## 2023-06-07 PROCEDURE — G0427 INPT/ED TELECONSULT70: HCPCS | Mod: 95,,, | Performed by: PSYCHIATRY & NEUROLOGY

## 2023-06-07 PROCEDURE — 81000 URINALYSIS NONAUTO W/SCOPE: CPT | Mod: ER | Performed by: STUDENT IN AN ORGANIZED HEALTH CARE EDUCATION/TRAINING PROGRAM

## 2023-06-07 PROCEDURE — 93010 EKG 12-LEAD: ICD-10-PCS | Mod: ,,, | Performed by: INTERNAL MEDICINE

## 2023-06-07 PROCEDURE — 99291 CRITICAL CARE FIRST HOUR: CPT | Mod: ER

## 2023-06-07 RX ORDER — CEFUROXIME AXETIL 500 MG/1
500 TABLET ORAL 2 TIMES DAILY
Qty: 20 TABLET | Refills: 0 | Status: SHIPPED | OUTPATIENT
Start: 2023-06-07 | End: 2023-06-17

## 2023-06-07 RX ADMIN — SODIUM CHLORIDE, POTASSIUM CHLORIDE, SODIUM LACTATE AND CALCIUM CHLORIDE 500 ML: 600; 310; 30; 20 INJECTION, SOLUTION INTRAVENOUS at 04:06

## 2023-06-07 NOTE — SUBJECTIVE & OBJECTIVE
Woke up with symptoms?: no    Recent bleeding noted: no  Does the patient take any Blood Thinners? yes  Medications: Antiplatelets:  clopidogrel/Plavix and Anticoagulants:  apixaban/Eliquis      Past Medical History: Cancer    Past Surgical History: recent thrombectomy    Family History: no relevant history    Social History: no smoking, no drinking, no drugs    Allergies: No Known Allergies No relevant allergies    Review of Systems   Neurological: Positive for speech difficulty and weakness.   All other systems reviewed and are negative.    Objective:   Vitals: Blood pressure (!) 144/73, pulse 83, temperature 98.8 °F (37.1 °C), temperature source Oral, resp. rate 20, SpO2 97 %. Height: .    CT READ: Yes  No hemmorhage. No mass effect. No early infarct signs.     Physical Exam  Constitutional:       General: She is not in acute distress.  HENT:      Head: Normocephalic and atraumatic.   Eyes:      Pupils: Pupils are equal, round, and reactive to light.   Pulmonary:      Effort: Pulmonary effort is normal.   Neurological:      Comments: Dysarthria  L hemiparesis

## 2023-06-07 NOTE — CONSULTS
[facility]  Vascular Neurology  Comprehensive Stroke Center  TeleVascular Neurology Acute Consultation Note      Consults    Consulting Provider: SIXTO NEAL  Current Providers  No providers found    Patient Location:  [facility] EMERGENCY DEPARTMENT Emergency Department  Spoke hospital nurse at bedside with patient assisting consultant.     Patient information was obtained from relative(s).         Assessment/Plan:       Diagnoses:   Neuro  Chronic ischemic right MCA stroke  76 y/o woman presenting with worsening of baseline stroke deficts, now back at baseline.  Consider orthostasis/hypotension during PT.  If worsening recurs, recommend repeat MRI brain.  Continue home Eliquis.  Etiology of recent stroke unclear - hypercoagulability vs cardioembolism?            STROKE DOCUMENTATION     Acute Stroke Times:   Acute Stroke Times   Last Known Normal Date: 06/07/23  Last Known Normal Time: 1440  Stroke Team Called Date: 06/07/23  Stroke Team Called Time: 1524  Stroke Team Arrival Date: 06/07/23  Stroke Team Arrival Time: 1540  CT Interpretation Time: 1540  Thrombolytic Therapy Recommended: No  Thrombectomy Recommended: No    NIH Scale:  1a. Level of Consciousness: 0-->Alert, keenly responsive  1b. LOC Questions: 1-->Answers one question correctly  1c. LOC Commands: 0-->Performs both tasks correctly  2. Best Gaze: 0-->Normal  3. Visual: 0-->No visual loss  4. Facial Palsy: 2-->Partial paralysis (total or near-total paralysis of lower face)  5a. Motor Arm, Left: 1-->Drift, limb holds 90 (or 45) degrees, but drifts down before full 10 seconds, does not hit bed or other support  5b. Motor Arm, Right: 0-->No drift, limb holds 90 (or 45) degrees for full 10 secs  6a. Motor Leg, Left: 0-->No drift, leg holds 30 degree position for full 5 secs  6b. Motor Leg, Right: 0-->No drift, leg holds 30 degree position for full 5 secs  7. Limb Ataxia: 0-->Absent  8. Sensory: 0-->Normal, no sensory  loss  9. Best Language: 0-->No aphasia, normal  10. Dysarthria: 1-->Mild-to-moderate dysarthria, patient slurs at least some words and, at worst, can be understood with some difficulty  11. Extinction and Inattention (formerly Neglect): 0-->No abnormality  Total (NIH Stroke Scale): 5     Modified [place]    [place] Coma Scale:    ABCD2 Score:    [id number] Score:   HAS -BLED Score:   ICH Score:   Hunt & Constantino Classification:       Blood pressure (!) 144/73, pulse 83, temperature 98.8 °F (37.1 °C), temperature source Oral, resp. rate 20, SpO2 97 %.  Eligible for thrombolytic therapy?: No  Thrombolytic therapy recomended: Thrombolytic therapy not recommended due to Full dose anticoagulation   Possible Interventional Revascularization Candidate? No; at this time symptoms not suggestive of large vessel occlusion    Disposition Recommendation: admit to inpatient    Subjective:     History of Present Illness:  76 y/o woman with recent R ICA/MCA stroke (s/p thrombectomy), stage IV lung CA, DVT on Eliquis who presents after worsening of residual stroke symptoms after PT.  Patient was receiving home health PT when her speech became more slurred and her L arm was not antigravity.  Symptoms have since improved back to post-stroke new baseline.      Woke up with symptoms?: no    Recent bleeding noted: no  Does the patient take any Blood Thinners? yes  Medications: Antiplatelets:  clopidogrel/Plavix and Anticoagulants:  apixaban/Eliquis      Past Medical History: Cancer    Past Surgical History: recent thrombectomy    Family History: no relevant history    Social History: no smoking, no drinking, no drugs    Allergies: No Known Allergies No relevant allergies    Review of Systems   Neurological: Positive for speech difficulty and weakness.   All other systems reviewed and are negative.    Objective:   Vitals: Blood pressure (!) 144/73, pulse 83, temperature 98.8 °F (37.1 °C), temperature source Oral, resp. rate 20, SpO2 97 %.  Height: .    CT READ: Yes  No hemmorhage. No mass effect. No early infarct signs.     Physical Exam  Constitutional:       General: She is not in acute distress.  HENT:      Head: Normocephalic and atraumatic.   Eyes:      Pupils: Pupils are equal, round, and reactive to light.   Pulmonary:      Effort: Pulmonary effort is normal.   Neurological:      Comments: Dysarthria  L hemiparesis             Recommended the emergency room physician to have a brief discussion with the patient and/or family if available regarding the  risks and benefits of treatment, and to briefly document the occurrence of that discussion in his clinical encounter note.     The attending portion of this evaluation, treatment, and documentation was performed per Blessing Blackman MD via audiovisual.    Billing code:  (moderate to severe stroke, large areas of edema, some mimics)      This patient has a critical neurological condition/illness, with high morbidity and mortality.  There is a high probability for acute neurological change leading to clinical and possibly life-threatening deterioration requiring highest level of physician preparedness for urgent intervention.  Care was coordinated with other physicians involved in the patient's care.  Radiologic studies and laboratory data were reviewed and interpreted, and plan of care was re-assessed based on the results.  Diagnosis, treatment options and prognosis may have been discussed with the patient and/or family members or caregiver.  Further advanced medical management and further evaluation is warranted for his care.    In your opinion, this was a: Tier 1 Van Negative    Consult End Time: 5:51 PM     Blessing Blackman MD  [facility]  Vascular Neurology   [facility]

## 2023-06-07 NOTE — ASSESSMENT & PLAN NOTE
78 y/o woman presenting with worsening of baseline stroke deficts, now back at baseline.  Consider orthostasis/hypotension during PT.  If worsening recurs, recommend repeat MRI brain.  Continue home Eliquis.  Etiology of recent stroke unclear - hypercoagulability vs cardioembolism?
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
## 2023-06-07 NOTE — ED NOTES
MD requesting pt be given pudding. Documented that pt failed swallow study. Daughter assisting pt with eating pudding.

## 2023-06-08 ENCOUNTER — OFFICE VISIT (OUTPATIENT)
Dept: HEMATOLOGY/ONCOLOGY | Facility: CLINIC | Age: 78
End: 2023-06-08
Payer: MEDICARE

## 2023-06-08 ENCOUNTER — TELEPHONE (OUTPATIENT)
Dept: ORTHOPEDICS | Facility: CLINIC | Age: 78
End: 2023-06-08
Payer: MEDICARE

## 2023-06-08 ENCOUNTER — PATIENT MESSAGE (OUTPATIENT)
Dept: RESEARCH | Facility: HOSPITAL | Age: 78
End: 2023-06-08
Payer: MEDICARE

## 2023-06-08 ENCOUNTER — PATIENT MESSAGE (OUTPATIENT)
Dept: PRIMARY CARE CLINIC | Facility: CLINIC | Age: 78
End: 2023-06-08
Payer: MEDICARE

## 2023-06-08 DIAGNOSIS — R53.81 PHYSICAL DECONDITIONING: ICD-10-CM

## 2023-06-08 DIAGNOSIS — K86.1 IDIOPATHIC CHRONIC PANCREATITIS: ICD-10-CM

## 2023-06-08 DIAGNOSIS — G89.3 CHRONIC NEOPLASM-RELATED PAIN: ICD-10-CM

## 2023-06-08 DIAGNOSIS — E44.1 MILD PROTEIN-CALORIE MALNUTRITION: ICD-10-CM

## 2023-06-08 DIAGNOSIS — S09.90XS DEMENTIA DUE TO HEAD TRAUMA WITHOUT BEHAVIORAL DISTURBANCE: ICD-10-CM

## 2023-06-08 DIAGNOSIS — Z79.899 IMMUNODEFICIENCY DUE TO DRUG THERAPY: ICD-10-CM

## 2023-06-08 DIAGNOSIS — T45.1X5A CHEMOTHERAPY-INDUCED NAUSEA AND VOMITING: ICD-10-CM

## 2023-06-08 DIAGNOSIS — T45.1X5A CHEMOTHERAPY-INDUCED THROMBOCYTOPENIA: ICD-10-CM

## 2023-06-08 DIAGNOSIS — D69.59 CHEMOTHERAPY-INDUCED THROMBOCYTOPENIA: ICD-10-CM

## 2023-06-08 DIAGNOSIS — D84.821 IMMUNODEFICIENCY DUE TO DRUG THERAPY: ICD-10-CM

## 2023-06-08 DIAGNOSIS — L95.9 CUTANEOUS VASCULITIS: ICD-10-CM

## 2023-06-08 DIAGNOSIS — E03.2 DRUG-INDUCED HYPOTHYROIDISM: ICD-10-CM

## 2023-06-08 DIAGNOSIS — C79.31 SECONDARY MALIGNANT NEOPLASM OF BRAIN: ICD-10-CM

## 2023-06-08 DIAGNOSIS — I82.412 ACUTE DEEP VEIN THROMBOSIS (DVT) OF LEFT FEMORAL VEIN: ICD-10-CM

## 2023-06-08 DIAGNOSIS — F02.80 DEMENTIA DUE TO HEAD TRAUMA WITHOUT BEHAVIORAL DISTURBANCE: ICD-10-CM

## 2023-06-08 DIAGNOSIS — C34.92 ADENOCARCINOMA OF LEFT LUNG, STAGE 4: Primary | ICD-10-CM

## 2023-06-08 DIAGNOSIS — I25.2 HISTORY OF NON-ST ELEVATION MYOCARDIAL INFARCTION (NSTEMI): ICD-10-CM

## 2023-06-08 DIAGNOSIS — H54.8 LEGALLY BLIND: ICD-10-CM

## 2023-06-08 DIAGNOSIS — Z86.73 HISTORY OF STROKE: ICD-10-CM

## 2023-06-08 DIAGNOSIS — R11.2 CHEMOTHERAPY-INDUCED NAUSEA AND VOMITING: ICD-10-CM

## 2023-06-08 PROCEDURE — 1159F MED LIST DOCD IN RCRD: CPT | Mod: CPTII,95,, | Performed by: INTERNAL MEDICINE

## 2023-06-08 PROCEDURE — 99215 PR OFFICE/OUTPT VISIT, EST, LEVL V, 40-54 MIN: ICD-10-PCS | Mod: 95,,, | Performed by: INTERNAL MEDICINE

## 2023-06-08 PROCEDURE — 1160F PR REVIEW ALL MEDS BY PRESCRIBER/CLIN PHARMACIST DOCUMENTED: ICD-10-PCS | Mod: CPTII,95,, | Performed by: INTERNAL MEDICINE

## 2023-06-08 PROCEDURE — 1159F PR MEDICATION LIST DOCUMENTED IN MEDICAL RECORD: ICD-10-PCS | Mod: CPTII,95,, | Performed by: INTERNAL MEDICINE

## 2023-06-08 PROCEDURE — 1111F DSCHRG MED/CURRENT MED MERGE: CPT | Mod: CPTII,95,, | Performed by: INTERNAL MEDICINE

## 2023-06-08 PROCEDURE — 1157F ADVNC CARE PLAN IN RCRD: CPT | Mod: CPTII,95,, | Performed by: INTERNAL MEDICINE

## 2023-06-08 PROCEDURE — 1160F RVW MEDS BY RX/DR IN RCRD: CPT | Mod: CPTII,95,, | Performed by: INTERNAL MEDICINE

## 2023-06-08 PROCEDURE — 99215 OFFICE O/P EST HI 40 MIN: CPT | Mod: 95,,, | Performed by: INTERNAL MEDICINE

## 2023-06-08 PROCEDURE — 1111F PR DISCHARGE MEDS RECONCILED W/ CURRENT OUTPATIENT MED LIST: ICD-10-PCS | Mod: CPTII,95,, | Performed by: INTERNAL MEDICINE

## 2023-06-08 PROCEDURE — 1157F PR ADVANCE CARE PLAN OR EQUIV PRESENT IN MEDICAL RECORD: ICD-10-PCS | Mod: CPTII,95,, | Performed by: INTERNAL MEDICINE

## 2023-06-08 NOTE — PROGRESS NOTES
PATIENT: Nathan Simpson  MRN: [id number]  DATE: 6/8/2023    Diagnosis:   1. Adenocarcinoma of left lung, stage 4    2. Acute deep vein thrombosis (DVT) of left femoral vein    3. Secondary malignant neoplasm of brain    4. Idiopathic chronic pancreatitis    5. Immunodeficiency due to drug therapy    6. Legally blind    7. Chemotherapy-induced thrombocytopenia    8. Cutaneous vasculitis    9. Mild protein-calorie malnutrition    10. Drug-induced hypothyroidism    11. Chronic neoplasm-related pain    12. Chemotherapy-induced nausea and vomiting    13. Physical deconditioning    14. 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 History of stroke    16. History of non-ST elevation myocardial infarction (NSTEMI)      Chief Complaint:  Lung cancer    Telemedicine visit:  The patient location is: home  The chief complaint leading to consultation is: lung cancer    Visit type: audiovisual    Face to Face time with patient: 10 minutes  15 minutes of total time spent on the encounter, which includes face to face time and non-face to face time preparing to see the patient (eg, review of tests), Obtaining and/or reviewing separately obtained history, Documenting clinical information in the electronic or other health record, Independently interpreting results (not separately reported) and communicating results to the patient/family/caregiver, or Care coordination (not separately reported).     Each patient to whom he or she provides medical services by telemedicine is:  (1) informed of the relationship between the physician and patient and the respective role of any other health care provider with respect to management of the patient; and (2) notified that he or she may decline to receive medical services by telemedicine and may withdraw from such care at any time.    Notes: see below    Subjective:     History of Present Illness:     Dx: Stage 4 Left lung adenocarcinoma    She has history of chronic idiopathic  pancreatitis, CT abd Feb 2021 showed a lung mass.    02/21/2021 CT abdomen pelvis with contrast - Pulmonary nodule/mass within the left lower lobe. Sclerotic appearing lesion within the anterior aspect of L1 also noted, also new since examination of 2015 concerning for metastatic disease.    02/22/2021 CT chest with contrast - Masslike lesion of the left lower lobe concerning for pulmonary neoplasm. Mildly enlarged left hilar and AP window nodes concerning for metastasis.    02/23/2021 ultrasound abdomen - Pancreatic and biliary ductal dilatation again noted, corresponding to the appearance on CT. There is no sonographic evidence for cholelithiasis, however there is a focal area of gallbladder wall heterogeneous thickening, the possibility of adenomyosis is considered, however given the appearance a focal infiltrating process or neoplasm of the gallbladder wall cannot be excluded.  Close clinical and historical correlation and follow-up is recommended. There is no sonographic evidence for cholelithiasis on this exam.    03/15/2021 CT guided biopsy left lower lobe lung mass showed Lung adenocarcinoma.  It was PDL1 positive with 1% tumor cells being positive.  It was also positive for the EGFR Exon 19 mutation.  It was negative for other mutations ALK, BRAF, KRISHAN, MET, NTRK1, RET, ROS1    5/17/2021 PET scan - Hypermetabolic left lower lobe mass representing patient's biopsy-proven lung cancer.  PET-CT evidence of metastatic disease to numerous hilar and mediastinal lymph nodes, and bone. Subcentimeter right upper lobe pulmonary nodule, below the size threshold for PET.    5/17/2021 Brain MRI Subcentimeter question ring-enhancing lesion in the left inferior parietal lobule somewhat distorted by motion cannot exclude parenchymal metastases.    Patient is in a wheelchair.  She cannot see very well.  She lives in [facility] with her daughter, Ms. Kitchen    -6/5/21-started osimertinib 80 mg QD, due to side effects the dose is  decreased to 40 mg and she continues to do much better on reduced dose, on lowered dose she has had no further issues with hand sores    - she underwent MRI brain on 1/23/23.  - she underwent CT scans on 3/20/23.  - she presented to the emergency room on 4/2/23 for acute deep vein thrombosis of left lower extremity. She was discharged with apixaban.        INTERVAL HISTORY:   - here for follow-up of stage IV EGFR Exon 19 positive lung adenocarcinoma. She had previously seen Dr. Copeland.   - she was hospitalized in May 2023 for NSTEMI/stroke.  - today, she has her ups and downs. She has home health with physical therapy. Her mouth is drooping on one side. She was diagnosed with a urinary tract infection earlier this week.  - She denies shortness of breath, chest pain, nausea, vomiting, diarrhea, constipation.   - she has been holding osimertinib 40mg daily since the hospitalization at [facility] in May 2023.      Past Medical, Surgical, Family and Social History Reviewed.    Medications and Allergies reviewed    Review of Systems   Constitutional:  Positive for fatigue. Negative for fever and unexpected weight change.   Eyes:  Positive for visual disturbance.   Respiratory:  Negative for shortness of breath.    Cardiovascular:  Positive for leg swelling. Negative for chest pain.   Gastrointestinal:  Negative for abdominal pain, blood in stool, constipation, diarrhea, nausea and vomiting.   Genitourinary:  Negative for dysuria and hematuria.   Musculoskeletal:  Positive for arthralgias and back pain.        Left leg pain   Skin:  Negative for wound.   Neurological:  Negative for dizziness and headaches.   Hematological:  Negative for adenopathy.     Objective:     There were no vitals filed for this visit.      BMI: There is no height or weight on file to calculate BMI.  Deferred due to telemedicine visit.    Physical Exam  Deferred due to telemedicine visit.      IMAGING  Ultrasound lower extremity - left  (4/2/23):  Left thigh veins: Clot in the left common femoral vein, left femoral veins, left GSV.     Left calf veins: The visualized calf veins are patent.  Popliteal, ATV and peroneal vein are patent.     Contralateral CFV: The contralateral (right) common femoral vein is patent and free of thrombus.     Miscellaneous: None     Impression:     Positive DVT from the left common femoral vein, left femoral vein, profundal vein.     CT chest/abdomen/pelvis (3/20/23): I have personally reviewed the images  1. There is an ill-defined mass in the posteromedial aspect of the left lower lobe.  This mass measures 2.8 cm in craniocaudal dimension by 4.4 cm in AP dimension by 2.6 cm in medial-lateral dimension on the current examination.  On the prior examination it measured 1.9 cm in craniocaudal dimension by 4.3 cm in AP dimension by 2.4 cm in medial-lateral dimension.  There is an 8 mm spiculated nodule in the posterior aspect of the right upper lobe.  On the prior examination it measured 8 mm. There are findings characteristic of matted lymphadenopathy in the subcarinal region.  This is consistent with the patient's history and characteristic of metastatic disease.  2. There are persistent sclerotic changes in L1.  3. There are several hypodense areas scattered throughout the liver.  One of the larger ones measures 12 mm.  On the prior examination it measured 12 mm.  4. There is moderate generalized atrophy of the pancreas. The pancreatic duct is dilated. In the head of the pancreas it has a diameter of 4 mm.  5.  There is a 52 mm exophytic hypodense mass off of the posterolateral aspect of the midpole of the left kidney. This mass has a Hounsfield measurement of 4.  This is characteristic of a cyst.  6. There is persistent deformity of the right iliac bone.  This is characteristic of prior trauma.    MRI brain (1/23/23): I have personally reviewed the images  1. No adverse change compared to the MRI from 09/19/2022.  2.  Unchanged encephalomalacia involving the anterolateral left frontal lobe and majority of the left temporal lobe.  This could relate to prior infarct or prior traumatic brain injury.        CT Chest Abd Pelvis 12/19/22  I have reviewed the images   Impression:     Findings relatively unchanged compared to 09/19/2022.     2.3 x 1.9 cm nodule in the infrahilar left lower lobe.  1.6 x 1.0 cm nodule in the posterior left lung base.     8 mm sub solid nodular density in the posterior right upper lobe.     Right greater than left apical fibrotic type changes with additional fibrosis versus subsegmental atelectasis in the dependent right greater than left lower lobes.     Unchanged sclerotic lesion of L1.    CT Abdomen 9/19/22  Impression:     1. There are ill-defined masses in both lungs. One of the larger ones is located in the posteromedial aspect of the left lower lobe.  On the current examination it measures 26 mm in craniocaudal dimension by 17 mm in AP dimension by 23 mm in medial-lateral dimension.  On the prior examination it measured 18 mm in craniocaudal dimension by 16 mm in AP dimension by 23 mm in medial-lateral dimension.  This is consistent with the patient's history and characteristic of metastatic disease.  2. There are persistent sclerotic changes in the L1 vertebral body.  3. There are several oval-shaped areas of hypodensity in the liver.  One of the larger ones measures 12 mm and is located in the dome of the liver.  On the prior examination it measured 12 mm.  4. There is a 57 mm exophytic hypodense mass off of the posterolateral aspect of midpole of the left kidney. This mass has a Hounsfield measurement of 7.  This is characteristic of a cyst.  All CT scans at this facility use dose modulation, iterative reconstruction, and/or weight base dosing when appropriate to reduce radiation dose when appropriate to reduce radiation dose to as low as reasonably achievable.     MRI brain 9/19/22  Impression:      1. No evidence of metastatic disease to the brain.  Findings are similar to the previous MRI from 05/17/2021.  2. Unchanged encephalomalacia involving the anteroinferior most aspects of the temporal lobes, left more extensive than right as well as the anteroinferior left frontal lobe.  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       Assessment:       1. Adenocarcinoma of left lung, stage 4    2. Acute deep vein thrombosis (DVT) of left femoral vein    3. Secondary malignant neoplasm of brain    4. Idiopathic chronic pancreatitis    5. Immunodeficiency due to drug therapy    6. Legally blind    7. Chemotherapy-induced thrombocytopenia    8. Cutaneous vasculitis    9. Mild protein-calorie malnutrition    10. Drug-induced hypothyroidism    11. Chronic neoplasm-related pain    12. Chemotherapy-induced nausea and vomiting    13. Physical deconditioning    14. 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 and she is tolerating this much better  -reviewed CT scan from 04/11, there is only a marginal increase in the spiculated mass in the medial left lower lobe, no other new areas of malignant disease, hence will continue osimertinib 40 mg q.d.  -9/19/22 MRI brain with no evidence metastatic brain disease, CT abd completed, was not able to do chest as well as pt reached max contrast dosing.  -9/19/22 Ct abdomen shows no trace growth on one mass as reviewed with pt and daughter today.  -12/19/22 CT CAP stable   - MRI brain (1/23/23) was negative/stable.  - CT chest/abdomen/pelvis (3/20/23) revealed stable disease  - she was hospitalized in May 2023 for NSTEMI/stroke.  - She was diagnosed with a urinary tract infection earlier this week.  - she  has been holding osimertinib 40mg daily since the hospitalization at [facility] in May 2023.  - I asked her to resume osimertinib once she completes therapy for her urinary tract infection.  - return to clinic in 1 month.     Acute deep vein thrombosis (DVT) of left femoral vein  - diagnosed on ultrasound from 4/2/23.  - risk factors include malignancy, relatively immobility/venous stasis  - In the setting of advanced cancer, she will need lifelong anticoagulation.  - continue apixaban.      Keratitis/cutaneous vasculitis  -has resolved after decreasing osimertinib to 40 mg QD.  - no current issues. Continue to monitor    Brain metastasis  -there is a question of metastatic spread of lung cancer to the brain based on Brain MRI done in may 2021  -repeat MRI brain 08/03/2021 shows improvement and decreased edema signal  -repeat MRI of 9/19/2022 no evidence metastatic brain disease.  - MRI brain (1/23/23) was negative/stable.    Idiopathic pancreatitis  -EUS plus/minus ERCP per GI  - continue norco as needed.    Thrombocytopenia  - Labs have been reviewed. Platelet count is 114 k/uL  -secondary to osimertinib  -noted  -will monitor    History of stroke / history of NSTEMI  - she was hospitalized in May 2023 for NSTEMI/stroke.  - she has been holding osimertinib 40mg daily since the hospitalization at [facility] in May 2023.  - defer management of NSTEMI and history of stroke to cardiology and primary care/neurology, respectively.    - return to clinic in 1 month.     Garry Mcqueen M.D.  Hematology/Oncology  [facility] - [address], [address]  [place] [address]  Phone: [phone]  Fax: [phone]

## 2023-06-08 NOTE — TELEPHONE ENCOUNTER
----- Message from Erlin Barrios sent at 6/8/2023  8:45 AM CDT -----  Contact: PT's daughter  Type: Requesting to speak with nurse        Who Called: PT's daughter   Regarding: ER visit yesterday for UTI  Would the patient rather a call back or a response via [facility]? Call back  Best Call Back Number: [phone]  Additional Information:

## 2023-06-08 NOTE — TELEPHONE ENCOUNTER
Spoke with patient daughter, she stated her mom wasn't feeling good enough to come in so I scheduled a virtual appointment with doctor ruiz today for 3:00 pm

## 2023-06-09 LAB — BACTERIA UR CULT: ABNORMAL

## 2023-06-12 ENCOUNTER — SPECIALTY PHARMACY (OUTPATIENT)
Dept: PHARMACY | Facility: CLINIC | Age: 78
End: 2023-06-12
Payer: MEDICARE

## 2023-06-12 NOTE — TELEPHONE ENCOUNTER
Ms. Simpson's daughter states patient has been holding tagrisso and was instructed to start after completion of abx. She has 25 tablets of tagrisso on hand and will complete abx on 6/17.

## 2023-06-13 ENCOUNTER — TELEPHONE (OUTPATIENT)
Dept: HEMATOLOGY/ONCOLOGY | Facility: CLINIC | Age: 78
End: 2023-06-13
Payer: MEDICARE

## 2023-06-13 NOTE — TELEPHONE ENCOUNTER
Spoke with pt daughter and informed her that I will get with  about if its okay to take the aspirin.

## 2023-06-13 NOTE — TELEPHONE ENCOUNTER
----- Message from Tamara Boston sent at 6/13/2023  9:56 AM CDT -----  Type:  Needs Medical Advice    Who Called: pt daughter  Symptoms (please be specific): pt daughter is requesting to get a return call they have a medication question to ask      Would the patient rather a call back or a response via [facility]? call  Best Call Back Number: [phone]  Additional Information:

## 2023-06-19 ENCOUNTER — TELEPHONE (OUTPATIENT)
Dept: HEMATOLOGY/ONCOLOGY | Facility: CLINIC | Age: 78
End: 2023-06-19
Payer: MEDICARE

## 2023-06-19 NOTE — TELEPHONE ENCOUNTER
----- Message from Anthony De Leon sent at 6/19/2023  3:29 PM CDT -----  Type:  Patient Returning Call    Who Called:Daughter   Who Left Message for Patient:Zahra Cage  Does the patient know what this is regarding?:questions   Would the patient rather a call back or a response via [facility]? call  Best Call Back Number:[phone]  Additional Information:   Daughter asking if mother can take  dullax to her  move bowls

## 2023-06-19 NOTE — TELEPHONE ENCOUNTER
----- Message from Doug De La Rosa sent at 6/19/2023  2:28 PM CDT -----  Type:  Needs Medical Advice    Who Called: daughter   Symptoms (please be specific): unable to swallow chemo pill   Would the patient rather a call back or a response via [facility]? Call   Best Call Back Number: [phone]  Additional Information: Caller would like to know if it's ok if she crush the pill to help her swallow it

## 2023-06-27 ENCOUNTER — TELEPHONE (OUTPATIENT)
Dept: HEMATOLOGY/ONCOLOGY | Facility: CLINIC | Age: 78
End: 2023-06-27
Payer: MEDICARE

## 2023-06-27 DIAGNOSIS — L89.151 PRESSURE INJURY OF SACRAL REGION, STAGE 1: ICD-10-CM

## 2023-06-27 DIAGNOSIS — G89.3 CHRONIC NEOPLASM-RELATED PAIN: Primary | ICD-10-CM

## 2023-06-27 RX ORDER — HONEY 100 %
1 PASTE (ML) TOPICAL 2 TIMES DAILY PRN
Qty: 44 ML | Refills: 1 | Status: SHIPPED | OUTPATIENT
Start: 2023-06-27 | End: 2023-07-10

## 2023-06-27 RX ORDER — LIDOCAINE 50 MG/G
1 PATCH TOPICAL DAILY PRN
Qty: 30 PATCH | Refills: 0 | Status: SHIPPED | OUTPATIENT
Start: 2023-06-27 | End: 2023-07-10

## 2023-06-27 NOTE — TELEPHONE ENCOUNTER
----- Message from Garry Mcqueen MD sent at 6/27/2023  1:09 PM CDT -----  Regarding: RE: Call back  Contact: [phone]  I'll send something in.     thanks  ----- Message -----  From: Zahra Cage RN  Sent: 6/27/2023   9:18 AM CDT  To: Garry Mcqueen MD  Subject: FW: Call back                                      ----- Message -----  From: Chantal Villavicencio  Sent: 6/27/2023   9:14 AM CDT  To: Richar Castañeda Staff  Subject: Call back                                        Who Called: PT's Daughter     Patient is calling to see if Dr. Garry Mcqueen can call in medication for bed sore and the patches for her back . Please advice

## 2023-06-28 ENCOUNTER — TELEPHONE (OUTPATIENT)
Dept: HEMATOLOGY/ONCOLOGY | Facility: CLINIC | Age: 78
End: 2023-06-28
Payer: MEDICARE

## 2023-06-28 DIAGNOSIS — B36.9 FUNGAL RASH OF TRUNK: Primary | ICD-10-CM

## 2023-06-28 RX ORDER — DOXYLAMINE SUCCINATE 25 MG
TABLET ORAL 2 TIMES DAILY PRN
Qty: 56.7 G | Refills: 2 | Status: SHIPPED | OUTPATIENT
Start: 2023-06-28

## 2023-06-28 NOTE — TELEPHONE ENCOUNTER
----- Message -----  From: Maria R Ahn  Sent: 6/28/2023  11:03 AM CDT  To: Richar Castañeda Staff    .Type:  Needs Medical Advice    Who Called: pt daughter Fidelina    Would the patient rather a call back or a response via [facility]? Call back  Best Call Back Number: [phone]  Additional Information:      Pt tikaalexandriaabigail stated she called about getting cream for pt diaper rash and went to the pharmacy and nothing was there please call pt daughter back

## 2023-07-05 ENCOUNTER — SPECIALTY PHARMACY (OUTPATIENT)
Dept: PHARMACY | Facility: CLINIC | Age: 78
End: 2023-07-05
Payer: MEDICARE

## 2023-07-05 NOTE — TELEPHONE ENCOUNTER
Specialty Pharmacy - Refill Coordination    Specialty Medication Orders Linked to Encounter      Flowsheet Row Most Recent Value   Medication #1 osimertinib (TAGRISSO) 40 mg Tab (Order#[id number], Rx#[id number])            Refill Questions - Documented Responses      Flowsheet Row Most Recent Value   Patient Availability and HIPAA Verification    Does patient want to proceed with activity? Yes   HIPAA/medical authority confirmed? Yes   Relationship to patient of person spoken to? Child   Refill Screening Questions    Changes to allergies? No   Changes to medications? No   New conditions since last clinic visit? No   Unplanned office visit, urgent care, ED, or hospital admission in the last 4 weeks? No   How does patient/caregiver feel medication is working? Excellent   Financial problems or insurance changes? No   How many doses of your specialty medications were missed in the last 4 weeks? 0   Would patient like to speak to a pharmacist? No   When does the patient need to receive the medication? 07/07/23   Refill Delivery Questions    How will the patient receive the medication? MED[facility]   When does the patient need to receive the medication? 07/07/23   Shipping Address Home   Address in [facility] confirmed and updated if neccessary? Yes   Expected Copay ($) 0   Is the patient able to afford the medication copay? Yes   Payment Method zero copay   Days supply of Refill 30   Supplies needed? No supplies needed   Refill activity completed? Yes   Refill activity plan Refill scheduled   Shipment/Pickup Date: 07/06/23            Current Outpatient Medications   Medication Sig    acetaminophen (TYLENOL) 500 MG tablet Take 500 mg by mouth every 6 (six) hours as needed for Pain.    apixaban (ELIQUIS) 5 mg Tab Take 1 tablet (5 mg total) by mouth 2 (two) times daily.    atorvastatin (LIPITOR) 40 MG tablet Take 1 tablet (40 mg total) by mouth once daily.    capsaicin-menthol (SALONPAS, CAPSAICIN-MENTHOL,) 0.025-1.25 %  PtMd Apply 1 patch topically daily as needed.    clopidogreL (PLAVIX) 75 mg tablet Take 1 tablet (75 mg total) by mouth once daily.    diclofenac sodium (VOLTAREN) 1 % Gel Apply 2 g topically daily as needed.    honey (MEDIHONEY, HONEY,) 100 % Pste Apply 1 application topically 2 (two) times daily as needed (apply to affected area twice daily as needed.).    HYDROcodone-acetaminophen (NORCO)  mg per tablet Take 1 tablet by mouth every 6 (six) hours as needed (chronic pain). Take 1/2 tablet by mouth in the morning and 1/2 tablet by mouth in the evening as needed    LIDOcaine (LIDODERM) 5 % Place 1 patch onto the skin daily as needed (pain). Remove & Discard patch within 12 hours or as directed by MD    metoprolol succinate (TOPROL-XL) 25 MG 24 hr tablet Take 1 tablet (25 mg total) by mouth once daily.    miconazole (MICOTIN) 2 % cream Apply topically 2 (two) times daily as needed (apply to affected area twice daily as needed.).    osimertinib (TAGRISSO) 40 mg Tab Take 1 tablet (40 mg) by mouth once daily.    promethazine (PHENERGAN) 25 MG tablet Take 1 tablet (25 mg total) by mouth every 6 (six) hours as needed for Nausea.    scopolamine (TRANSDERM-SCOP) 1.3-1.5 mg (1 mg over 3 days) Place 1 patch onto the skin every 72 hours.   Last reviewed on 6/8/2023  3:05 PM by Garry Mcqueen MD    Review of patient's allergies indicates:  No Known Allergies Last reviewed on  6/27/2023 1:13 PM by Garry Mcqueen      Tasks added this encounter   No tasks added.   Tasks due within next 3 months   7/8/2023 - Refill Coordination Outreach (1 time occurrence)     Clem Adair, Alonso  [facility]  [address] [place] [id number]  Phone: [phone]  Fax: [phone]

## 2023-07-10 ENCOUNTER — HOSPITAL ENCOUNTER (INPATIENT)
Facility: HOSPITAL | Age: 78
LOS: 11 days | Discharge: HOSPICE | DRG: 871 | End: 2023-07-21
Attending: EMERGENCY MEDICINE | Admitting: EMERGENCY MEDICINE
Payer: MEDICARE

## 2023-07-10 DIAGNOSIS — F02.80: ICD-10-CM

## 2023-07-10 DIAGNOSIS — S09.90XS: ICD-10-CM

## 2023-07-10 DIAGNOSIS — E87.20 LACTIC ACIDOSIS: ICD-10-CM

## 2023-07-10 DIAGNOSIS — R13.10 DYSPHAGIA, UNSPECIFIED TYPE: ICD-10-CM

## 2023-07-10 DIAGNOSIS — I63.89 OTHER CEREBRAL INFARCTION: ICD-10-CM

## 2023-07-10 DIAGNOSIS — A41.9 SEPSIS DUE TO PNEUMONIA: ICD-10-CM

## 2023-07-10 DIAGNOSIS — E44.1 MILD PROTEIN-CALORIE MALNUTRITION: ICD-10-CM

## 2023-07-10 DIAGNOSIS — R41.82 AMS (ALTERED MENTAL STATUS): Primary | ICD-10-CM

## 2023-07-10 DIAGNOSIS — J18.9 SEPSIS DUE TO PNEUMONIA: ICD-10-CM

## 2023-07-10 DIAGNOSIS — K52.89 STERCORAL COLITIS: ICD-10-CM

## 2023-07-10 DIAGNOSIS — R62.7 FAILURE TO THRIVE IN ADULT: ICD-10-CM

## 2023-07-10 DIAGNOSIS — R78.81 BACTEREMIA DUE TO KLEBSIELLA PNEUMONIAE: ICD-10-CM

## 2023-07-10 DIAGNOSIS — N17.9 AKI (ACUTE KIDNEY INJURY): ICD-10-CM

## 2023-07-10 DIAGNOSIS — E16.2 HYPOGLYCEMIA: ICD-10-CM

## 2023-07-10 DIAGNOSIS — R41.0 CONFUSION: ICD-10-CM

## 2023-07-10 DIAGNOSIS — R79.89 ELEVATED TROPONIN: ICD-10-CM

## 2023-07-10 DIAGNOSIS — E87.0 HYPERNATREMIA: ICD-10-CM

## 2023-07-10 DIAGNOSIS — Z86.73 CHRONIC ISCHEMIC RIGHT MCA STROKE: ICD-10-CM

## 2023-07-10 DIAGNOSIS — C34.92 ADENOCARCINOMA OF LEFT LUNG, STAGE 4: ICD-10-CM

## 2023-07-10 DIAGNOSIS — B96.1 BACTEREMIA DUE TO KLEBSIELLA PNEUMONIAE: ICD-10-CM

## 2023-07-10 LAB
ALBUMIN SERPL BCP-MCNC: 2.7 G/DL (ref 3.5–5.2)
ALP SERPL-CCNC: 173 U/L (ref 55–135)
ALT SERPL W/O P-5'-P-CCNC: 84 U/L (ref 10–44)
ANION GAP SERPL CALC-SCNC: 12 MMOL/L (ref 8–16)
ANION GAP SERPL CALC-SCNC: ABNORMAL MMOL/L (ref 8–16)
AST SERPL-CCNC: 131 U/L (ref 10–40)
BACTERIA #/AREA URNS HPF: ABNORMAL /HPF
BASOPHILS # BLD AUTO: ABNORMAL K/UL (ref 0–0.2)
BASOPHILS NFR BLD: 0 % (ref 0–1.9)
BILIRUB SERPL-MCNC: 0.5 MG/DL (ref 0.1–1)
BILIRUB UR QL STRIP: NEGATIVE
BNP SERPL-MCNC: 40 PG/ML (ref 0–99)
BUN SERPL-MCNC: 94 MG/DL (ref 8–23)
BUN SERPL-MCNC: 99 MG/DL (ref 8–23)
CALCIUM SERPL-MCNC: 8.3 MG/DL (ref 8.7–10.5)
CALCIUM SERPL-MCNC: 9.3 MG/DL (ref 8.7–10.5)
CHLORIDE SERPL-SCNC: 127 MMOL/L (ref 95–110)
CHLORIDE SERPL-SCNC: >130 MMOL/L (ref 95–110)
CLARITY UR: CLEAR
CO2 SERPL-SCNC: 19 MMOL/L (ref 23–29)
CO2 SERPL-SCNC: 21 MMOL/L (ref 23–29)
COLOR UR: YELLOW
CREAT SERPL-MCNC: 2.5 MG/DL (ref 0.5–1.4)
CREAT SERPL-MCNC: 2.8 MG/DL (ref 0.5–1.4)
DIFFERENTIAL METHOD: ABNORMAL
EOSINOPHIL # BLD AUTO: ABNORMAL K/UL (ref 0–0.5)
EOSINOPHIL NFR BLD: 0 % (ref 0–8)
ERYTHROCYTE [DISTWIDTH] IN BLOOD BY AUTOMATED COUNT: 20.4 % (ref 11.5–14.5)
EST. GFR  (NO RACE VARIABLE): 17 ML/MIN/1.73 M^2
EST. GFR  (NO RACE VARIABLE): 19 ML/MIN/1.73 M^2
FIO2: 21 %
GLUCOSE SERPL-MCNC: 120 MG/DL (ref 70–110)
GLUCOSE SERPL-MCNC: 340 MG/DL (ref 70–110)
GLUCOSE UR QL STRIP: NEGATIVE
HCT VFR BLD AUTO: 39.1 % (ref 37–48.5)
HGB BLD-MCNC: 11.2 G/DL (ref 12–16)
HGB UR QL STRIP: ABNORMAL
HYALINE CASTS #/AREA URNS LPF: 0 /LPF
IMM GRANULOCYTES # BLD AUTO: ABNORMAL K/UL (ref 0–0.04)
IMM GRANULOCYTES NFR BLD AUTO: ABNORMAL % (ref 0–0.5)
KETONES UR QL STRIP: NEGATIVE
LACTATE SERPL-SCNC: 3.3 MMOL/L (ref 0.5–2.2)
LACTATE SERPL-SCNC: 3.8 MMOL/L (ref 0.5–2.2)
LDH SERPL L TO P-CCNC: 2.9 MMOL/L (ref 0.5–2.2)
LEUKOCYTE ESTERASE UR QL STRIP: NEGATIVE
LIPASE SERPL-CCNC: 9 U/L (ref 4–60)
LYMPHOCYTES # BLD AUTO: ABNORMAL K/UL (ref 1–4.8)
LYMPHOCYTES NFR BLD: 16 % (ref 18–48)
MCH RBC QN AUTO: 23.9 PG (ref 27–31)
MCHC RBC AUTO-ENTMCNC: 28.6 G/DL (ref 32–36)
MCV RBC AUTO: 84 FL (ref 82–98)
MICROSCOPIC COMMENT: ABNORMAL
MONOCYTES # BLD AUTO: ABNORMAL K/UL (ref 0.3–1)
MONOCYTES NFR BLD: 1 % (ref 4–15)
NEUTROPHILS NFR BLD: 58 % (ref 38–73)
NEUTS BAND NFR BLD MANUAL: 25 %
NITRITE UR QL STRIP: NEGATIVE
NRBC BLD-RTO: 0 /100 WBC
PH UR STRIP: 5 [PH] (ref 5–8)
PLATELET # BLD AUTO: 92 K/UL (ref 150–450)
PLATELET BLD QL SMEAR: ABNORMAL
PMV BLD AUTO: ABNORMAL FL (ref 9.2–12.9)
POC PERFORMED BY: ABNORMAL
POTASSIUM SERPL-SCNC: 4 MMOL/L (ref 3.5–5.1)
POTASSIUM SERPL-SCNC: 4.3 MMOL/L (ref 3.5–5.1)
PROCALCITONIN SERPL IA-MCNC: 7.11 NG/ML
PROT SERPL-MCNC: 8 G/DL (ref 6–8.4)
PROT UR QL STRIP: ABNORMAL
RBC # BLD AUTO: 4.68 M/UL (ref 4–5.4)
RBC #/AREA URNS HPF: 9 /HPF (ref 0–4)
SODIUM SERPL-SCNC: 160 MMOL/L (ref 136–145)
SODIUM SERPL-SCNC: 169 MMOL/L (ref 136–145)
SP GR UR STRIP: >1.03 (ref 1–1.03)
SPECIMEN SOURCE: ABNORMAL
SQUAMOUS #/AREA URNS HPF: 1 /HPF
TROPONIN I SERPL DL<=0.01 NG/ML-MCNC: 0.18 NG/ML (ref 0–0.03)
TROPONIN I SERPL DL<=0.01 NG/ML-MCNC: 0.21 NG/ML (ref 0–0.03)
URN SPEC COLLECT METH UR: ABNORMAL
UROBILINOGEN UR STRIP-ACNC: NEGATIVE EU/DL
WBC # BLD AUTO: 8.5 K/UL (ref 3.9–12.7)
WBC #/AREA URNS HPF: 4 /HPF (ref 0–5)

## 2023-07-10 PROCEDURE — 96365 THER/PROPH/DIAG IV INF INIT: CPT

## 2023-07-10 PROCEDURE — 83880 ASSAY OF NATRIURETIC PEPTIDE: CPT | Performed by: EMERGENCY MEDICINE

## 2023-07-10 PROCEDURE — 83605 ASSAY OF LACTIC ACID: CPT

## 2023-07-10 PROCEDURE — 87077 CULTURE AEROBIC IDENTIFY: CPT | Performed by: EMERGENCY MEDICINE

## 2023-07-10 PROCEDURE — 87186 SC STD MICRODIL/AGAR DIL: CPT | Performed by: EMERGENCY MEDICINE

## 2023-07-10 PROCEDURE — 85007 BL SMEAR W/DIFF WBC COUNT: CPT | Performed by: EMERGENCY MEDICINE

## 2023-07-10 PROCEDURE — 99285 EMERGENCY DEPT VISIT HI MDM: CPT | Mod: 25

## 2023-07-10 PROCEDURE — 87040 BLOOD CULTURE FOR BACTERIA: CPT | Performed by: EMERGENCY MEDICINE

## 2023-07-10 PROCEDURE — 80048 BASIC METABOLIC PNL TOTAL CA: CPT | Mod: XB | Performed by: EMERGENCY MEDICINE

## 2023-07-10 PROCEDURE — 84484 ASSAY OF TROPONIN QUANT: CPT | Performed by: EMERGENCY MEDICINE

## 2023-07-10 PROCEDURE — 63600175 PHARM REV CODE 636 W HCPCS: Performed by: EMERGENCY MEDICINE

## 2023-07-10 PROCEDURE — 99900035 HC TECH TIME PER 15 MIN (STAT)

## 2023-07-10 PROCEDURE — 25000003 PHARM REV CODE 250: Performed by: STUDENT IN AN ORGANIZED HEALTH CARE EDUCATION/TRAINING PROGRAM

## 2023-07-10 PROCEDURE — 96360 HYDRATION IV INFUSION INIT: CPT

## 2023-07-10 PROCEDURE — 93010 ELECTROCARDIOGRAM REPORT: CPT | Mod: ,,, | Performed by: INTERNAL MEDICINE

## 2023-07-10 PROCEDURE — 96367 TX/PROPH/DG ADDL SEQ IV INF: CPT

## 2023-07-10 PROCEDURE — 93005 ELECTROCARDIOGRAM TRACING: CPT

## 2023-07-10 PROCEDURE — 25500020 PHARM REV CODE 255: Performed by: EMERGENCY MEDICINE

## 2023-07-10 PROCEDURE — 82803 BLOOD GASES ANY COMBINATION: CPT

## 2023-07-10 PROCEDURE — 83690 ASSAY OF LIPASE: CPT | Performed by: EMERGENCY MEDICINE

## 2023-07-10 PROCEDURE — 11000001 HC ACUTE MED/SURG PRIVATE ROOM

## 2023-07-10 PROCEDURE — 25000003 PHARM REV CODE 250: Performed by: EMERGENCY MEDICINE

## 2023-07-10 PROCEDURE — 85027 COMPLETE CBC AUTOMATED: CPT | Performed by: EMERGENCY MEDICINE

## 2023-07-10 PROCEDURE — 81000 URINALYSIS NONAUTO W/SCOPE: CPT | Performed by: STUDENT IN AN ORGANIZED HEALTH CARE EDUCATION/TRAINING PROGRAM

## 2023-07-10 PROCEDURE — 27000221 HC OXYGEN, UP TO 24 HOURS

## 2023-07-10 PROCEDURE — 84484 ASSAY OF TROPONIN QUANT: CPT | Mod: 91 | Performed by: STUDENT IN AN ORGANIZED HEALTH CARE EDUCATION/TRAINING PROGRAM

## 2023-07-10 PROCEDURE — 93010 EKG 12-LEAD: ICD-10-PCS | Mod: ,,, | Performed by: INTERNAL MEDICINE

## 2023-07-10 PROCEDURE — 80053 COMPREHEN METABOLIC PANEL: CPT | Performed by: EMERGENCY MEDICINE

## 2023-07-10 PROCEDURE — 84145 PROCALCITONIN (PCT): CPT | Performed by: EMERGENCY MEDICINE

## 2023-07-10 PROCEDURE — 87154 CUL TYP ID BLD PTHGN 6+ TRGT: CPT | Performed by: EMERGENCY MEDICINE

## 2023-07-10 PROCEDURE — 63600175 PHARM REV CODE 636 W HCPCS: Performed by: STUDENT IN AN ORGANIZED HEALTH CARE EDUCATION/TRAINING PROGRAM

## 2023-07-10 PROCEDURE — 83605 ASSAY OF LACTIC ACID: CPT | Performed by: EMERGENCY MEDICINE

## 2023-07-10 PROCEDURE — 94760 N-INVAS EAR/PLS OXIMETRY 1: CPT

## 2023-07-10 RX ORDER — METRONIDAZOLE 500 MG/100ML
500 INJECTION, SOLUTION INTRAVENOUS
Status: DISCONTINUED | OUTPATIENT
Start: 2023-07-10 | End: 2023-07-10

## 2023-07-10 RX ORDER — NALOXONE HCL 0.4 MG/ML
0.02 VIAL (ML) INJECTION
Status: DISCONTINUED | OUTPATIENT
Start: 2023-07-10 | End: 2023-07-21 | Stop reason: HOSPADM

## 2023-07-10 RX ORDER — ASPIRIN 81 MG/1
81 TABLET ORAL DAILY
COMMUNITY

## 2023-07-10 RX ORDER — VANCOMYCIN HCL IN 5 % DEXTROSE 1G/250ML
1000 PLASTIC BAG, INJECTION (ML) INTRAVENOUS
Status: DISCONTINUED | OUTPATIENT
Start: 2023-07-10 | End: 2023-07-10

## 2023-07-10 RX ORDER — IBUPROFEN 200 MG
24 TABLET ORAL
Status: DISCONTINUED | OUTPATIENT
Start: 2023-07-10 | End: 2023-07-19

## 2023-07-10 RX ORDER — ACETAMINOPHEN 650 MG/1
650 SUPPOSITORY RECTAL ONCE
Status: DISCONTINUED | OUTPATIENT
Start: 2023-07-10 | End: 2023-07-18

## 2023-07-10 RX ORDER — GLUCAGON 1 MG
1 KIT INJECTION
Status: DISCONTINUED | OUTPATIENT
Start: 2023-07-10 | End: 2023-07-19

## 2023-07-10 RX ORDER — ATORVASTATIN CALCIUM 40 MG/1
40 TABLET, FILM COATED ORAL NIGHTLY
COMMUNITY
Start: 2023-06-01 | End: 2024-05-31

## 2023-07-10 RX ORDER — LIDOCAINE 50 MG/G
1 PATCH TOPICAL
Status: DISCONTINUED | OUTPATIENT
Start: 2023-07-10 | End: 2023-07-21 | Stop reason: HOSPADM

## 2023-07-10 RX ORDER — ACETAMINOPHEN 325 MG/1
650 TABLET ORAL EVERY 6 HOURS PRN
Status: DISCONTINUED | OUTPATIENT
Start: 2023-07-10 | End: 2023-07-19

## 2023-07-10 RX ORDER — IBUPROFEN 200 MG
16 TABLET ORAL
Status: DISCONTINUED | OUTPATIENT
Start: 2023-07-10 | End: 2023-07-19

## 2023-07-10 RX ORDER — METOPROLOL SUCCINATE 25 MG/1
1 TABLET, EXTENDED RELEASE ORAL DAILY
COMMUNITY
Start: 2023-05-20 | End: 2023-07-10

## 2023-07-10 RX ORDER — SODIUM CHLORIDE 0.9 % (FLUSH) 0.9 %
10 SYRINGE (ML) INJECTION EVERY 12 HOURS PRN
Status: DISCONTINUED | OUTPATIENT
Start: 2023-07-10 | End: 2023-07-12

## 2023-07-10 RX ORDER — GUAIFENESIN 100 MG/5ML
200 SOLUTION ORAL 3 TIMES DAILY PRN
COMMUNITY

## 2023-07-10 RX ORDER — LACTULOSE 10 G/15ML
15 SOLUTION ORAL; RECTAL DAILY PRN
COMMUNITY
Start: 2023-06-21

## 2023-07-10 RX ADMIN — CEFEPIME 2 G: 2 INJECTION, POWDER, FOR SOLUTION INTRAVENOUS at 09:07

## 2023-07-10 RX ADMIN — AZITHROMYCIN MONOHYDRATE 500 MG: 500 INJECTION, POWDER, LYOPHILIZED, FOR SOLUTION INTRAVENOUS at 09:07

## 2023-07-10 RX ADMIN — APIXABAN 5 MG: 5 TABLET, FILM COATED ORAL at 08:07

## 2023-07-10 RX ADMIN — SODIUM CHLORIDE, POTASSIUM CHLORIDE, SODIUM LACTATE AND CALCIUM CHLORIDE 1000 ML: 600; 310; 30; 20 INJECTION, SOLUTION INTRAVENOUS at 03:07

## 2023-07-10 RX ADMIN — IOHEXOL 75 ML: 350 INJECTION, SOLUTION INTRAVENOUS at 03:07

## 2023-07-10 RX ADMIN — METRONIDAZOLE 500 MG: 5 INJECTION, SOLUTION INTRAVENOUS at 08:07

## 2023-07-10 RX ADMIN — PIPERACILLIN AND TAZOBACTAM 4.5 G: 4; .5 INJECTION, POWDER, LYOPHILIZED, FOR SOLUTION INTRAVENOUS; PARENTERAL at 04:07

## 2023-07-10 RX ADMIN — LIDOCAINE PATCH 5% 1 PATCH: 700 PATCH TOPICAL at 10:07

## 2023-07-10 RX ADMIN — VANCOMYCIN HYDROCHLORIDE 1250 MG: 1.25 INJECTION, POWDER, LYOPHILIZED, FOR SOLUTION INTRAVENOUS at 05:07

## 2023-07-10 NOTE — Clinical Note
Diagnosis: Hypernatremia [879736]   Admitting Provider:: CHRISTEL POPE [[id number]]   Future Attending Provider: BABATUNDE CHAN [[id number]]   Reason for IP Medical Treatment  (Clinical interventions that can only be accomplished in the IP setting? ) :: electrolyte derangements   I certify that Inpatient services for greater than or equal to 2 midnights are medically necessary:: Yes   Plans for Post-Acute care--if anticipated (pick the single best option):: A. No post acute care anticipated at this time
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
## 2023-07-10 NOTE — PROVIDER PROGRESS NOTES - EMERGENCY DEPT.
Encounter Date: 7/10/2023    ED Physician Progress Notes            77-year-old female signed out to me pending remainder of labs and CT results, with plan for admission for tried mental status.  Found to be hyponatremic to 169 and also with BEST.  Upon further discussion with family who is now bedside, they said that she was at her baseline yesterday but noticed this morning that she was more tired and eating less than normal.  They are not otherwise sure how she has been lately because aunt, who is typically primary caregiver, is not present.  They said she has chronic difficulty with swallowing.  Report that she has improving mental status over ED stay.  At time of my evaluation after sign-out she is at her baseline per family.  Patient has received 1 L LR plus fluids from abx, will hold on additional IV fluids to avoid over correcting sodium.  Ordered rectal Tylenol for mildly elevated temperature of a 100.3° rectal.  Broad-spectrum antibiotics have already been ordered. Nurse reported that on recheck of temp prior to administration of tylenol she felt she was going to have a BM and temp had normalized so did not receive Tylenol    -CTH Impression:     This report was flagged in Epic as abnormal.     1. Interval development of patchy hypoattenuation involving the right corona radiata, recent infarct is of concern, correlation with current symptomatology advised.  2. There is low-attenuation along the right paramedian cerebellum.  This is suspected to reflect motion artifact however additional focus of infarct not excluded.  3. Stable left inferior temporal encephalomalacia.  4. Sequela of chronic microvascular ischemic change and senescent change.    Given that pt is at her baseline per family with chronic L-sided weakness from prior CVA, acute infarct appears less likely at this time. Regardless she is outside window for tPA. This can be additionally followed while inpatient with MRI if team deems this  indicated.     -CT A/P Impression:     Increasing consolidation in the right lung base.  Correlate for simple pneumonia versus postobstructive pneumonitis.     Increasing nodule/lymph node enlargement in the right hilum.  Correlate for neoplastic versus metastatic lymph nodes.     Spiculated mass in the left lower lobe of the lung suspicious for lung neoplasm.  Synchronous or metachronous lesion to the right lower lobe is question.     Shaggy aorta with pronounced soft plaque throughout the thoracic and abdominal aorta without ulceration, dissection or occlusion.     Stable low-density lesion in the liver and atrophy of the spleen with prominence of the splenic duct.     New low-density in the upper pole of the left kidney.  Correlate for focal nephritis.  Focal neoplasm is not excluded.     Rectal fecal impaction without stercoral colitis.    Repeat lactate and BMP pending at time of admission. Discussed pt with [facility] and admitted to ICU for further management.     Critical care time spent on the evaluation and treatment of severe organ dysfunction, review of pertinent labs and imaging studies, discussions with consulting providers and discussions with patient/family: 30 minutes.

## 2023-07-11 LAB
ACINETOBACTER CALCOACETICUS/BAUMANNII COMPLEX: NOT DETECTED
ALBUMIN SERPL BCP-MCNC: 2.1 G/DL (ref 3.5–5.2)
ALBUMIN SERPL BCP-MCNC: 2.2 G/DL (ref 3.5–5.2)
ALBUMIN SERPL BCP-MCNC: 2.3 G/DL (ref 3.5–5.2)
ALP SERPL-CCNC: 139 U/L (ref 55–135)
ALP SERPL-CCNC: 142 U/L (ref 55–135)
ALP SERPL-CCNC: 143 U/L (ref 55–135)
ALT SERPL W/O P-5'-P-CCNC: 62 U/L (ref 10–44)
ALT SERPL W/O P-5'-P-CCNC: 62 U/L (ref 10–44)
ALT SERPL W/O P-5'-P-CCNC: 69 U/L (ref 10–44)
ANION GAP SERPL CALC-SCNC: ABNORMAL MMOL/L (ref 8–16)
ANISOCYTOSIS BLD QL SMEAR: SLIGHT
AST SERPL-CCNC: 104 U/L (ref 10–40)
AST SERPL-CCNC: 112 U/L (ref 10–40)
AST SERPL-CCNC: 116 U/L (ref 10–40)
BACTEROIDES FRAGILIS: NOT DETECTED
BASOPHILS # BLD AUTO: ABNORMAL K/UL (ref 0–0.2)
BASOPHILS NFR BLD: 0 % (ref 0–1.9)
BILIRUB SERPL-MCNC: 0.3 MG/DL (ref 0.1–1)
BILIRUB SERPL-MCNC: 0.4 MG/DL (ref 0.1–1)
BILIRUB SERPL-MCNC: 0.5 MG/DL (ref 0.1–1)
BUN SERPL-MCNC: 81 MG/DL (ref 8–23)
BUN SERPL-MCNC: 89 MG/DL (ref 8–23)
BUN SERPL-MCNC: 89 MG/DL (ref 8–23)
BUN SERPL-MCNC: 93 MG/DL (ref 8–23)
BURR CELLS BLD QL SMEAR: ABNORMAL
CALCIUM SERPL-MCNC: 8.7 MG/DL (ref 8.7–10.5)
CANDIDA ALBICANS: NOT DETECTED
CANDIDA AURIS: NOT DETECTED
CANDIDA GLABRATA: NOT DETECTED
CANDIDA KRUSEI: NOT DETECTED
CANDIDA PARAPSILOSIS: NOT DETECTED
CANDIDA TROPICALIS: NOT DETECTED
CHLORIDE SERPL-SCNC: >130 MMOL/L (ref 95–110)
CO2 SERPL-SCNC: 12 MMOL/L (ref 23–29)
CO2 SERPL-SCNC: 13 MMOL/L (ref 23–29)
CO2 SERPL-SCNC: 15 MMOL/L (ref 23–29)
CO2 SERPL-SCNC: 19 MMOL/L (ref 23–29)
CREAT SERPL-MCNC: 1.7 MG/DL (ref 0.5–1.4)
CREAT SERPL-MCNC: 1.9 MG/DL (ref 0.5–1.4)
CREAT SERPL-MCNC: 2.1 MG/DL (ref 0.5–1.4)
CREAT SERPL-MCNC: 2.3 MG/DL (ref 0.5–1.4)
CRYPTOCOCCUS NEOFORMANS/GATTII: NOT DETECTED
CTX-M GENE: NOT DETECTED
DIFFERENTIAL METHOD: ABNORMAL
ENTEROBACTER CLOACAE COMPLEX: NOT DETECTED
ENTEROBACTERALES: ABNORMAL
ENTEROCOCCUS FAECALIS: NOT DETECTED
ENTEROCOCCUS FAECIUM: NOT DETECTED
EOSINOPHIL # BLD AUTO: ABNORMAL K/UL (ref 0–0.5)
EOSINOPHIL NFR BLD: 0 % (ref 0–8)
ERYTHROCYTE [DISTWIDTH] IN BLOOD BY AUTOMATED COUNT: 19.9 % (ref 11.5–14.5)
ESCHERICHIA COLI: NOT DETECTED
EST. GFR  (NO RACE VARIABLE): 21 ML/MIN/1.73 M^2
EST. GFR  (NO RACE VARIABLE): 24 ML/MIN/1.73 M^2
EST. GFR  (NO RACE VARIABLE): 27 ML/MIN/1.73 M^2
EST. GFR  (NO RACE VARIABLE): 31 ML/MIN/1.73 M^2
GLUCOSE SERPL-MCNC: 101 MG/DL (ref 70–110)
GLUCOSE SERPL-MCNC: 114 MG/DL (ref 70–110)
GLUCOSE SERPL-MCNC: 117 MG/DL (ref 70–110)
GLUCOSE SERPL-MCNC: 99 MG/DL (ref 70–110)
HAEMOPHILUS INFLUENZAE: NOT DETECTED
HCT VFR BLD AUTO: 35.1 % (ref 37–48.5)
HGB BLD-MCNC: 9.7 G/DL (ref 12–16)
HYPOCHROMIA BLD QL SMEAR: ABNORMAL
IMM GRANULOCYTES # BLD AUTO: ABNORMAL K/UL (ref 0–0.04)
IMM GRANULOCYTES NFR BLD AUTO: ABNORMAL % (ref 0–0.5)
IMP GENE: NOT DETECTED
KLEBSIELLA AEROGENES: NOT DETECTED
KLEBSIELLA OXYTOCA: NOT DETECTED
KLEBSIELLA PNEUMONIAE GROUP: DETECTED
KPC: NOT DETECTED
LACTATE SERPL-SCNC: 1.8 MMOL/L (ref 0.5–2.2)
LISTERIA MONOCYTOGENES: NOT DETECTED
LYMPHOCYTES # BLD AUTO: ABNORMAL K/UL (ref 1–4.8)
LYMPHOCYTES NFR BLD: 24 % (ref 18–48)
MAGNESIUM SERPL-MCNC: 3.3 MG/DL (ref 1.6–2.6)
MCH RBC QN AUTO: 23.7 PG (ref 27–31)
MCHC RBC AUTO-ENTMCNC: 27.6 G/DL (ref 32–36)
MCR-1: NOT DETECTED
MCV RBC AUTO: 86 FL (ref 82–98)
MEC A/C AND MREJ (MRSA): NOT DETECTED
MEC A/C: NOT DETECTED
MONOCYTES # BLD AUTO: ABNORMAL K/UL (ref 0.3–1)
MONOCYTES NFR BLD: 1 % (ref 4–15)
NDM GENE: NOT DETECTED
NEISSERIA MENINGITIDIS: NOT DETECTED
NEUTROPHILS NFR BLD: 33 % (ref 38–73)
NEUTS BAND NFR BLD MANUAL: 42 %
NRBC BLD-RTO: 0 /100 WBC
OVALOCYTES BLD QL SMEAR: ABNORMAL
OXA-48-LIKE: NOT DETECTED
PHOSPHATE SERPL-MCNC: 4.4 MG/DL (ref 2.7–4.5)
PLATELET # BLD AUTO: 78 K/UL (ref 150–450)
PLATELET BLD QL SMEAR: ABNORMAL
PMV BLD AUTO: ABNORMAL FL (ref 9.2–12.9)
POCT GLUCOSE: 128 MG/DL (ref 70–110)
POIKILOCYTOSIS BLD QL SMEAR: SLIGHT
POTASSIUM SERPL-SCNC: 3.7 MMOL/L (ref 3.5–5.1)
POTASSIUM SERPL-SCNC: 3.8 MMOL/L (ref 3.5–5.1)
POTASSIUM SERPL-SCNC: 4.5 MMOL/L (ref 3.5–5.1)
POTASSIUM SERPL-SCNC: 4.7 MMOL/L (ref 3.5–5.1)
PROT SERPL-MCNC: 6.7 G/DL (ref 6–8.4)
PROT SERPL-MCNC: 6.8 G/DL (ref 6–8.4)
PROT SERPL-MCNC: 6.8 G/DL (ref 6–8.4)
PROTEUS SPECIES: NOT DETECTED
PSEUDOMONAS AERUGINOSA: NOT DETECTED
RBC # BLD AUTO: 4.09 M/UL (ref 4–5.4)
SALMONELLA SP: NOT DETECTED
SERRATIA MARCESCENS: NOT DETECTED
SODIUM SERPL-SCNC: 159 MMOL/L (ref 136–145)
SODIUM SERPL-SCNC: 161 MMOL/L (ref 136–145)
SODIUM SERPL-SCNC: 164 MMOL/L (ref 136–145)
SODIUM SERPL-SCNC: 165 MMOL/L (ref 136–145)
STAPHYLOCOCCUS AUREUS: NOT DETECTED
STAPHYLOCOCCUS EPIDERMIDIS: NOT DETECTED
STAPHYLOCOCCUS LUGDUNESIS: NOT DETECTED
STAPHYLOCOCCUS SPECIES: NOT DETECTED
STENOTROPHOMONAS MALTOPHILIA: NOT DETECTED
STREPTOCOCCUS AGALACTIAE: NOT DETECTED
STREPTOCOCCUS PNEUMONIAE: NOT DETECTED
STREPTOCOCCUS PYOGENES: NOT DETECTED
STREPTOCOCCUS SPECIES: NOT DETECTED
VAN A/B: NOT DETECTED
VANCOMYCIN SERPL-MCNC: 13.5 UG/ML
VIM GENE: NOT DETECTED
WBC # BLD AUTO: 8.54 K/UL (ref 3.9–12.7)

## 2023-07-11 PROCEDURE — 25000003 PHARM REV CODE 250: Performed by: STUDENT IN AN ORGANIZED HEALTH CARE EDUCATION/TRAINING PROGRAM

## 2023-07-11 PROCEDURE — 63600175 PHARM REV CODE 636 W HCPCS: Performed by: INTERNAL MEDICINE

## 2023-07-11 PROCEDURE — 80053 COMPREHEN METABOLIC PANEL: CPT | Mod: 91 | Performed by: STUDENT IN AN ORGANIZED HEALTH CARE EDUCATION/TRAINING PROGRAM

## 2023-07-11 PROCEDURE — 99222 PR INITIAL HOSPITAL CARE,LEVL II: ICD-10-PCS | Mod: ,,, | Performed by: INTERNAL MEDICINE

## 2023-07-11 PROCEDURE — 99222 1ST HOSP IP/OBS MODERATE 55: CPT | Mod: ,,, | Performed by: INTERNAL MEDICINE

## 2023-07-11 PROCEDURE — 80202 ASSAY OF VANCOMYCIN: CPT | Performed by: INTERNAL MEDICINE

## 2023-07-11 PROCEDURE — 92610 EVALUATE SWALLOWING FUNCTION: CPT

## 2023-07-11 PROCEDURE — 80048 BASIC METABOLIC PNL TOTAL CA: CPT | Mod: XB | Performed by: STUDENT IN AN ORGANIZED HEALTH CARE EDUCATION/TRAINING PROGRAM

## 2023-07-11 PROCEDURE — 36415 COLL VENOUS BLD VENIPUNCTURE: CPT | Performed by: INTERNAL MEDICINE

## 2023-07-11 PROCEDURE — 83735 ASSAY OF MAGNESIUM: CPT | Performed by: STUDENT IN AN ORGANIZED HEALTH CARE EDUCATION/TRAINING PROGRAM

## 2023-07-11 PROCEDURE — 99900035 HC TECH TIME PER 15 MIN (STAT)

## 2023-07-11 PROCEDURE — 84100 ASSAY OF PHOSPHORUS: CPT | Performed by: STUDENT IN AN ORGANIZED HEALTH CARE EDUCATION/TRAINING PROGRAM

## 2023-07-11 PROCEDURE — 11000001 HC ACUTE MED/SURG PRIVATE ROOM

## 2023-07-11 PROCEDURE — 63600175 PHARM REV CODE 636 W HCPCS: Performed by: STUDENT IN AN ORGANIZED HEALTH CARE EDUCATION/TRAINING PROGRAM

## 2023-07-11 PROCEDURE — 36415 COLL VENOUS BLD VENIPUNCTURE: CPT | Performed by: STUDENT IN AN ORGANIZED HEALTH CARE EDUCATION/TRAINING PROGRAM

## 2023-07-11 PROCEDURE — 27000221 HC OXYGEN, UP TO 24 HOURS

## 2023-07-11 PROCEDURE — 94761 N-INVAS EAR/PLS OXIMETRY MLT: CPT

## 2023-07-11 PROCEDURE — 85007 BL SMEAR W/DIFF WBC COUNT: CPT | Performed by: STUDENT IN AN ORGANIZED HEALTH CARE EDUCATION/TRAINING PROGRAM

## 2023-07-11 PROCEDURE — 83605 ASSAY OF LACTIC ACID: CPT | Performed by: STUDENT IN AN ORGANIZED HEALTH CARE EDUCATION/TRAINING PROGRAM

## 2023-07-11 PROCEDURE — 85027 COMPLETE CBC AUTOMATED: CPT | Performed by: STUDENT IN AN ORGANIZED HEALTH CARE EDUCATION/TRAINING PROGRAM

## 2023-07-11 PROCEDURE — 25000003 PHARM REV CODE 250: Performed by: INTERNAL MEDICINE

## 2023-07-11 RX ORDER — ENOXAPARIN SODIUM 100 MG/ML
30 INJECTION SUBCUTANEOUS EVERY 24 HOURS
Status: DISCONTINUED | OUTPATIENT
Start: 2023-07-11 | End: 2023-07-13

## 2023-07-11 RX ORDER — DEXTROSE MONOHYDRATE 50 MG/ML
INJECTION, SOLUTION INTRAVENOUS CONTINUOUS
Status: DISCONTINUED | OUTPATIENT
Start: 2023-07-11 | End: 2023-07-12

## 2023-07-11 RX ADMIN — AZITHROMYCIN MONOHYDRATE 500 MG: 500 INJECTION, POWDER, LYOPHILIZED, FOR SOLUTION INTRAVENOUS at 11:07

## 2023-07-11 RX ADMIN — CEFEPIME 2 G: 2 INJECTION, POWDER, FOR SOLUTION INTRAVENOUS at 11:07

## 2023-07-11 RX ADMIN — VANCOMYCIN HYDROCHLORIDE 1500 MG: 1.5 INJECTION, POWDER, LYOPHILIZED, FOR SOLUTION INTRAVENOUS at 09:07

## 2023-07-11 RX ADMIN — DEXTROSE MONOHYDRATE: 50 INJECTION, SOLUTION INTRAVENOUS at 05:07

## 2023-07-11 RX ADMIN — ENOXAPARIN SODIUM 30 MG: 30 INJECTION SUBCUTANEOUS at 05:07

## 2023-07-11 NOTE — PLAN OF CARE
Discharge assessment completed with Moi (pt's son) [phone].  He says pt lives with her 2 daughters and they are her full time caregivers.  His son was staying with pt while her 2 daughters are on a cruise.  They left Sunday.  Moi last saw pt July 4th.  She is wheelchair/bed bound at baseline.      Future Appointments   Date Time Provider Department Center   7/17/2023  8:50 AM [facility] RVPH LAB [facility]   7/24/2023  1:00 PM Garry Mcqueen MD [facility] HEM ONC [facility]   8/9/2023  1:40 PM Garry Mcqueen MD [facility] HEM ONC [facility]        07/11/23 1630   Discharge Assessment   Assessment Type Discharge Planning Assessment   Confirmed/corrected address, phone number and insurance Yes   Confirmed Demographics Correct on Facesheet   Source of Information family   Communicated JUDY with patient/caregiver Date not available/Unable to determine   Facility Arrived From: home   Do you expect to return to your current living situation? Yes   Do you have help at home or someone to help you manage your care at home? Yes   Who are your caregiver(s) and their phone number(s)? pt's daughters   Prior to hospitilization cognitive status: Unable to Assess   Current cognitive status: Unable to Assess   Walking or Climbing Stairs transferring difficulty, dependent   Dressing/Bathing bathing difficulty, dependent   Do you have any problems with: Errands/Grocery   Home Accessibility wheelchair accessible   Home Layout Able to live on 1st floor   Equipment Currently Used at Home wheelchair;hospital bed   Readmission within 30 days? No   Patient currently being followed by outpatient case management? No   Do you currently have service(s) that help you manage your care at home? No   Do you take prescription medications? Yes   Do you have prescription coverage? Yes   Do you have any problems affording any of your prescribed medications? No   Is the patient taking medications as prescribed? yes   Who is going to help you  get home at discharge? pt's daughters   Are you on dialysis? No   Do you take coumadin? No   Discharge Plan A Home with family       Gavin Mcgovern RN   Supervisor Case Management-[facility]  [phone]

## 2023-07-11 NOTE — PLAN OF CARE
Pt seen this date in room, pt may have ice chips for comfort at this time. Discussed with primary MD team about Palliative care consult and discussion of goals of care and options for long term nutrition given pt's disease trajectory. REC: ice chips only with consistent oral care to be provided. Pt with crusty and dry oral cavity covered with blood. Primary MD team made aware.
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
## 2023-07-11 NOTE — NURSING
Rapid Response Nurse Follow-up Note     FOLLOW UP  Followed up with patient for proactive rounding. Temp 96.3. Continuing warming blanket at this time. O2 sats 90-92%, on 3 L NC. UOP~ 200ccs since admit to floor.  No acute issues at this time. Pt appears comfortable. Reviewed plan of care with bedside RNFreddie .   Team will continue to follow.    Call back the Rapid Response NurseStacie at [phone] for additional questions or concerns.
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
## 2023-07-11 NOTE — PROGRESS NOTES
Pharmacokinetic Assessment Follow Up: IV Vancomycin    Vancomycin serum concentration assessment(s):    The random level was drawn correctly and can be used to guide therapy at this time. The measurement is below the desired definitive target range of 15 to 20 mcg/mL.    Vancomycin Regimen Plan:    Change regimen to Vancomycin 1500 mg IV x 1 dose with next serum random concentration measured at 1930 to on 07/12/2023    Drug levels (last 3 results):  Recent Labs   Lab Result Units 07/11/23  1734   Vancomycin, Random ug/mL 13.5       Pharmacy will continue to follow and monitor vancomycin.    Please contact pharmacy at extension [phone] for questions regarding this assessment.    Thank you for the consult,   Kate George       Patient brief summary:  Nathan Simpson is a 77 y.o. female initiated on antimicrobial therapy with IV Vancomycin for treatment of bacteremia    The patient's current regimen is pulse    Drug Allergies:   Review of patient's allergies indicates:  No Known Allergies    Actual Body Weight:   49.5 kg    Renal Function:   Estimated Creatinine Clearance: 19.1 mL/min (A) (based on SCr of 1.9 mg/dL (H)).,     Dialysis Method (if applicable):       CBC (last 72 hours):  Recent Labs   Lab Result Units 07/10/23  1527 07/11/23  0322   WBC K/uL 8.50 8.54   Hemoglobin g/dL 11.2* 9.7*   Hematocrit % 39.1 35.1*   Platelets K/uL 92* 78*   Gran % % 58.0 33.0*   Lymph % % 16.0* 24.0   Mono % % 1.0* 1.0*   Eosinophil % % 0.0 0.0   Basophil % % 0.0 0.0   Differential Method  Manual Manual       Metabolic Panel (last 72 hours):  Recent Labs   Lab Result Units 07/10/23  1527 07/10/23  1815 07/10/23  1941 07/11/23  0322 07/11/23  0744 07/11/23  1518   Sodium mmol/L 169* 160*  --  164* 161* 165*   Potassium mmol/L 4.3 4.0  --  3.8 3.7 4.7   Chloride mmol/L >130* 127*  --  >130* >130* >130*   CO2 mmol/L 19* 21*  --  19* 15* 13*   Glucose mg/dL 120* 340*  --  117* 101 99   Glucose, UA   --   --  Negative  --   --   --     BUN mg/dL 99* 94*  --  89* 89* 93*   Creatinine mg/dL 2.8* 2.5*  --  2.3* 2.1* 1.9*   Albumin g/dL 2.7*  --   --  2.3*  --  2.1*   Total Bilirubin mg/dL 0.5  --   --  0.5  --  0.4   Alkaline Phosphatase U/L 173*  --   --  143*  --  142*   AST U/L 131*  --   --  116*  --  112*   ALT U/L 84*  --   --  69*  --  62*   Magnesium mg/dL  --   --   --  3.3*  --   --    Phosphorus mg/dL  --   --   --  4.4  --   --        Vancomycin Administrations:  vancomycin given in the last 96 hours                     vancomycin 1,250 mg in dextrose 5 % (D5W) 250 mL IVPB (Vial-Mate) (mg) 1,250 mg New Bag 07/10/23 1712                    Microbiologic Results:  Microbiology Results (last 7 days)       Procedure Component Value Units Date/Time    Rapid Organism ID by PCR (from Blood culture) [[id number]]  (Abnormal) Collected: 07/10/23 1420    Order Status: Completed Updated: 07/11/23 1051     Enterococcus faecalis Not Detected     Enterococcus faecium Not Detected     Listeria monocytogenes Not Detected     Staphylococcus spp. Not Detected     Staphylococcus aureus Not Detected     Staphylococcus epidermidis Not Detected     Staphylococcus lugdunensis Not Detected     Streptococcus species Not Detected     Streptococcus agalactiae Not Detected     Streptococcus pneumoniae Not Detected     Streptococcus pyogenes Not Detected     Acinetobacter calcoaceticus/baumannii complex Not Detected     Bacteroides fragilis Not Detected     Enterobacterales See species for ID     Enterobacter cloacae complex Not Detected     Escherichia coli Not Detected     Klebsiella aerogenes Not Detected     Klebsiella oxytoca Not Detected     Klebsiella pneumoniae group Detected     Proteus Not Detected     Salmonella sp Not Detected     Serratia marcescens Not Detected     Haemophilus influenzae Not Detected     Neisseria meningtidis Not Detected     Pseudomonas aeruginosa Not Detected     Stenotrophomonas maltophilia Not Detected     Candida albicans Not  Detected     Candida auris Not Detected     Candida glabrata Not Detected     Candida krusei Not Detected     Candida parapsilosis Not Detected     Candida tropicalis Not Detected     Cryptococcus neoformans/gattii Not Detected     CTX-M (ESBL ) Not Detected     IMP (Carbapenem resistant) Not Detected     KPC resistance gene (Carbapenem resistant) Not Detected     mcr-1  Not Detected     mec A/C  Not Detected     mec A/C and MREJ (MRSA) gene Not Detected     NDM (Carbapenem resistant) Not Detected     OXA-48-like (Carbapenem resistant) Not Detected     van A/B (VRE gene) Not Detected     VIM (Carbapenem resistant) Not Detected    Narrative:      Aerobic and anaerobic    Blood culture x two cultures. Draw prior to antibiotics. [[id number]] Collected: 07/10/23 1420    Order Status: Completed Specimen: Blood Updated: 07/11/23 0933     Blood Culture, Routine Gram stain samanta bottle: Gram negative rods      Results called to and read back by: Bhavik Tobin LPN  07/11/2023  09:31    Narrative:      Aerobic and anaerobic    Blood culture x two cultures. Draw prior to antibiotics. [[id number]] Collected: 07/10/23 1430    Order Status: Completed Specimen: Blood Updated: 07/11/23 0315     Blood Culture, Routine No Growth to date    Narrative:      Aerobic and anaerobic

## 2023-07-11 NOTE — PROGRESS NOTES
07/10/23 2253   Admission   Initial VN Admission Questions Complete   Shift   Virtual Nurse - Patient Verbalized Approval Of Camera Use;VN Rounding   Safety/Activity   Patient Rounds bed in low position;call light in patient/parent reach;clutter free environment maintained;visualized patient;placement of personal items at bedside   Safety Promotion/Fall Prevention assistive device/personal item within reach;bed alarm set;Fall Risk reviewed with patient/family;side rails raised x 2;family to remain at bedside   Positioning   Head of Bed (HOB) Positioning HOB at 30-45 degrees   Pain/Comfort/Sleep   Comfort/Acceptable Pain Level 0  (rio; pt unable to state)   VN cued in to pt's room with permission. Pt appears to be sleeping. Respirations even and unlabored. Pt's son, Moi, at bedside. Admission questions completed with Moi and plan of care reviewed. Pt's son denies any needs at this time. Call bell w/in reach. Instructed to call for needs/assist.    Pt's son reports that pt can eat pudding and jello at home but they blend any other foods at home. He also reports that pt's liquids are thickened at home but is unsure if it is nectar thick or honey thick consistency. Dysphagia soft diet ordered here. Dr Landon notified of son's reports. Bedside nurse made aware as well. New orders received.

## 2023-07-11 NOTE — PROGRESS NOTES
Pharmacokinetic Initial Assessment: IV Vancomycin    Assessment/Plan:    Initiate intravenous vancomycin with loading dose of 1250 mg once with subsequent doses when random concentrations are less than 20 mcg/mL  Desired empiric serum trough concentration is 15 to 20 mcg/mL  Draw vancomycin random level on 07/11/2023 at 1700.  Pharmacy will continue to follow and monitor vancomycin.      Please contact pharmacy at extension [phone] with any questions regarding this assessment.     Thank you for the consult,   Kate George       Patient brief summary:  Nathan Simpson is a 77 y.o. female initiated on antimicrobial therapy with IV Vancomycin for treatment of suspected bacteremia    Drug Allergies:   Review of patient's allergies indicates:  No Known Allergies    Actual Body Weight:   61.2 kg    Renal Function:   Estimated Creatinine Clearance: 17.9 mL/min (A) (based on SCr of 2.5 mg/dL (H)).,     Dialysis Method (if applicable):       CBC (last 72 hours):  Recent Labs   Lab Result Units 07/10/23  1527   WBC K/uL 8.50   Hemoglobin g/dL 11.2*   Hematocrit % 39.1   Platelets K/uL 92*   Gran % % 58.0   Lymph % % 16.0*   Mono % % 1.0*   Eosinophil % % 0.0   Basophil % % 0.0   Differential Method  Manual       Metabolic Panel (last 72 hours):  Recent Labs   Lab Result Units 07/10/23  1527 07/10/23  1815   Sodium mmol/L 169* 160*   Potassium mmol/L 4.3 4.0   Chloride mmol/L >130* 127*   CO2 mmol/L 19* 21*   Glucose mg/dL 120* 340*   BUN mg/dL 99* 94*   Creatinine mg/dL 2.8* 2.5*   Albumin g/dL 2.7*  --    Total Bilirubin mg/dL 0.5  --    Alkaline Phosphatase U/L 173*  --    AST U/L 131*  --    ALT U/L 84*  --        Drug levels (last 3 results):  No results for input(s): VANCOMYCINRA, VANCORANDOM, VANCOMYCINPE, VANCOPEAK, VANCOMYCINTR, VANCOTROUGH in the last 72 hours.    Microbiologic Results:  Microbiology Results (last 7 days)       Procedure Component Value Units Date/Time    Blood culture x two cultures. Draw prior to  antibiotics. [[id number]] Collected: 07/10/23 1420    Order Status: Sent Specimen: Blood Updated: 07/10/23 1540    Blood culture x two cultures. Draw prior to antibiotics. [[id number]] Collected: 07/10/23 1430    Order Status: Sent Specimen: Blood Updated: 07/10/23 1547

## 2023-07-11 NOTE — PHARMACY MED REC
"  Admission Medication History     The home medication history was taken by Justine Goldberg CPhT.    Medication history obtained from, Patient's Granddaughter Verified    You may go to "Admission" then "Reconcile Home Medications" tabs to review and/or act upon these items.     The home medication list has been updated by the Pharmacy department.   Please read ALL comments highlighted in yellow.   Please address this information as you see fit.    Feel free to contact us if you have any questions or require assistance.      The medications listed below were removed from the home medication list.  Please reorder if appropriate:  Patient reports no longer taking the following medication(s):  Medihoney 100% paste  Lidoderm 5% patch  Metoprolol Xl 25 mg        Justine Goldberg CPhT.  Ext [phone]             .        "
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
## 2023-07-11 NOTE — NURSING
Rapid Response Nurse Follow-up Note     Followed up with patient for proactive rounding.   Na 161. Discussed with Dr Casey- to order D5 and remain on the floor...patient at baseline per family.  Team will continue to follow.  Please call Rapid Response RN, Juan Martins RN with any questions or concerns at [phone].
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
## 2023-07-11 NOTE — PLAN OF CARE
I spoke with Moi (pt's son) [phone].  He says Hellen and Fidelina (pt's daughters) are on a cruise at this time.  He is the primary contact at this time.  He will be back in pt's room in approximately 2 hours.  He prefers to complete assessment at that time.       07/11/23 1043   Discharge Assessment   Assessment Type Discharge Planning Assessment       Gavin Mcgovern RN   Supervisor Case Management-[place]  [phone]
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
## 2023-07-12 PROBLEM — R41.0 CONFUSION: Status: ACTIVE | Noted: 2023-07-12

## 2023-07-12 LAB
ALBUMIN SERPL BCP-MCNC: 1.9 G/DL (ref 3.5–5.2)
ALBUMIN SERPL BCP-MCNC: 2 G/DL (ref 3.5–5.2)
ALP SERPL-CCNC: 120 U/L (ref 55–135)
ALP SERPL-CCNC: 126 U/L (ref 55–135)
ALP SERPL-CCNC: 126 U/L (ref 55–135)
ALP SERPL-CCNC: 144 U/L (ref 55–135)
ALT SERPL W/O P-5'-P-CCNC: 49 U/L (ref 10–44)
ALT SERPL W/O P-5'-P-CCNC: 52 U/L (ref 10–44)
ALT SERPL W/O P-5'-P-CCNC: 55 U/L (ref 10–44)
ALT SERPL W/O P-5'-P-CCNC: 67 U/L (ref 10–44)
ANION GAP SERPL CALC-SCNC: 11 MMOL/L (ref 8–16)
ANION GAP SERPL CALC-SCNC: 13 MMOL/L (ref 8–16)
ANION GAP SERPL CALC-SCNC: 13 MMOL/L (ref 8–16)
ANION GAP SERPL CALC-SCNC: 9 MMOL/L (ref 8–16)
ANISOCYTOSIS BLD QL SMEAR: SLIGHT
AORTIC ROOT ANNULUS: 2.6 CM
AST SERPL-CCNC: 138 U/L (ref 10–40)
AST SERPL-CCNC: 187 U/L (ref 10–40)
AST SERPL-CCNC: 83 U/L (ref 10–40)
AST SERPL-CCNC: 93 U/L (ref 10–40)
AV INDEX (PROSTH): 0.91
AV MEAN GRADIENT: 2 MMHG
AV PEAK GRADIENT: 5 MMHG
AV VALVE AREA: 2.86 CM2
AV VELOCITY RATIO: 0.73
BASOPHILS NFR BLD: 0 % (ref 0–1.9)
BILIRUB SERPL-MCNC: 0.3 MG/DL (ref 0.1–1)
BILIRUB SERPL-MCNC: 0.4 MG/DL (ref 0.1–1)
BSA FOR ECHO PROCEDURE: 1.57 M2
BUN SERPL-MCNC: 52 MG/DL (ref 8–23)
BUN SERPL-MCNC: 60 MG/DL (ref 8–23)
BUN SERPL-MCNC: 66 MG/DL (ref 8–23)
BUN SERPL-MCNC: 74 MG/DL (ref 8–23)
BURR CELLS BLD QL SMEAR: ABNORMAL
CALCIUM SERPL-MCNC: 8 MG/DL (ref 8.7–10.5)
CALCIUM SERPL-MCNC: 8.1 MG/DL (ref 8.7–10.5)
CALCIUM SERPL-MCNC: 8.2 MG/DL (ref 8.7–10.5)
CALCIUM SERPL-MCNC: 8.4 MG/DL (ref 8.7–10.5)
CHLORIDE SERPL-SCNC: 121 MMOL/L (ref 95–110)
CHLORIDE SERPL-SCNC: 123 MMOL/L (ref 95–110)
CHLORIDE SERPL-SCNC: 126 MMOL/L (ref 95–110)
CHLORIDE SERPL-SCNC: 128 MMOL/L (ref 95–110)
CO2 SERPL-SCNC: 15 MMOL/L (ref 23–29)
CO2 SERPL-SCNC: 16 MMOL/L (ref 23–29)
CO2 SERPL-SCNC: 20 MMOL/L (ref 23–29)
CO2 SERPL-SCNC: 20 MMOL/L (ref 23–29)
CREAT SERPL-MCNC: 1.2 MG/DL (ref 0.5–1.4)
CREAT SERPL-MCNC: 1.3 MG/DL (ref 0.5–1.4)
CREAT SERPL-MCNC: 1.4 MG/DL (ref 0.5–1.4)
CREAT SERPL-MCNC: 1.5 MG/DL (ref 0.5–1.4)
CV ECHO LV RWT: 0.37 CM
DIFFERENTIAL METHOD: ABNORMAL
DOP CALC AO PEAK VEL: 1.09 M/S
DOP CALC AO VTI: 14.6 CM
DOP CALC LVOT AREA: 3.1 CM2
DOP CALC LVOT DIAMETER: 2 CM
DOP CALC LVOT PEAK VEL: 0.8 M/S
DOP CALC LVOT STROKE VOLUME: 41.76 CM3
DOP CALC MV VTI: 25.7 CM
DOP CALCLVOT PEAK VEL VTI: 13.3 CM
E WAVE DECELERATION TIME: 355.95 MSEC
E/A RATIO: 0.84
E/E' RATIO: 9.29 M/S
ECHO LV POSTERIOR WALL: 0.8 CM (ref 0.6–1.1)
EJECTION FRACTION: 55 %
EOSINOPHIL NFR BLD: 1 % (ref 0–8)
ERYTHROCYTE [DISTWIDTH] IN BLOOD BY AUTOMATED COUNT: 19.5 % (ref 11.5–14.5)
EST. GFR  (NO RACE VARIABLE): 35 ML/MIN/1.73 M^2
EST. GFR  (NO RACE VARIABLE): 39 ML/MIN/1.73 M^2
EST. GFR  (NO RACE VARIABLE): 42 ML/MIN/1.73 M^2
EST. GFR  (NO RACE VARIABLE): 46 ML/MIN/1.73 M^2
FRACTIONAL SHORTENING: 53 % (ref 28–44)
GLUCOSE SERPL-MCNC: 109 MG/DL (ref 70–110)
GLUCOSE SERPL-MCNC: 119 MG/DL (ref 70–110)
GLUCOSE SERPL-MCNC: 126 MG/DL (ref 70–110)
GLUCOSE SERPL-MCNC: 92 MG/DL (ref 70–110)
HCT VFR BLD AUTO: 27.7 % (ref 37–48.5)
HGB BLD-MCNC: 7.9 G/DL (ref 12–16)
HYPOCHROMIA BLD QL SMEAR: ABNORMAL
IMM GRANULOCYTES # BLD AUTO: ABNORMAL K/UL (ref 0–0.04)
IMM GRANULOCYTES NFR BLD AUTO: ABNORMAL % (ref 0–0.5)
INTERVENTRICULAR SEPTUM: 0.8 CM (ref 0.6–1.1)
IVC DIAMETER: 1.33 CM
LA MAJOR: 3.56 CM
LA MINOR: 4 CM
LA WIDTH: 2.2 CM
LEFT ATRIUM SIZE: 2.6 CM
LEFT ATRIUM VOLUME INDEX MOD: 10.8 ML/M2
LEFT ATRIUM VOLUME INDEX: 11.2 ML/M2
LEFT ATRIUM VOLUME MOD: 17.6 CM3
LEFT ATRIUM VOLUME: 18.32 CM3
LEFT INTERNAL DIMENSION IN SYSTOLE: 2 CM (ref 2.1–4)
LEFT VENTRICLE DIASTOLIC VOLUME INDEX: 50.69 ML/M2
LEFT VENTRICLE DIASTOLIC VOLUME: 82.62 ML
LEFT VENTRICLE MASS INDEX: 64 G/M2
LEFT VENTRICLE SYSTOLIC VOLUME INDEX: 16.7 ML/M2
LEFT VENTRICLE SYSTOLIC VOLUME: 27.28 ML
LEFT VENTRICULAR INTERNAL DIMENSION IN DIASTOLE: 4.29 CM (ref 3.5–6)
LEFT VENTRICULAR MASS: 104.92 G
LV LATERAL E/E' RATIO: 8.13 M/S
LV SEPTAL E/E' RATIO: 10.83 M/S
LVOT MG: 1.09 MMHG
LVOT MV: 0.49 CM/S
LYMPHOCYTES NFR BLD: 15 % (ref 18–48)
MAGNESIUM SERPL-MCNC: 3.1 MG/DL (ref 1.6–2.6)
MCH RBC QN AUTO: 23.6 PG (ref 27–31)
MCHC RBC AUTO-ENTMCNC: 28.5 G/DL (ref 32–36)
MCV RBC AUTO: 83 FL (ref 82–98)
MONOCYTES NFR BLD: 0 % (ref 4–15)
MV MEAN GRADIENT: 1 MMHG
MV PEAK A VEL: 0.77 M/S
MV PEAK E VEL: 0.65 M/S
MV PEAK GRADIENT: 4 MMHG
MV STENOSIS PRESSURE HALF TIME: 96.15 MS
MV VALVE AREA BY CONTINUITY EQUATION: 1.62 CM2
MV VALVE AREA P 1/2 METHOD: 2.29 CM2
NEUTROPHILS NFR BLD: 65 % (ref 38–73)
NEUTS BAND NFR BLD MANUAL: 19 %
NRBC BLD-RTO: 1 /100 WBC
OHS LV EJECTION FRACTION SIMPSONS BIPLANE MOD: 5 %
OVALOCYTES BLD QL SMEAR: ABNORMAL
PHOSPHATE SERPL-MCNC: 3.7 MG/DL (ref 2.7–4.5)
PISA AR MAX VEL: 1.28 M/S
PISA MRMAX VEL: 5.56 M/S
PISA TR MAX VEL: 2.71 M/S
PLATELET # BLD AUTO: 72 K/UL (ref 150–450)
PLATELET BLD QL SMEAR: ABNORMAL
PMV BLD AUTO: ABNORMAL FL (ref 9.2–12.9)
POIKILOCYTOSIS BLD QL SMEAR: SLIGHT
POTASSIUM SERPL-SCNC: 3.1 MMOL/L (ref 3.5–5.1)
POTASSIUM SERPL-SCNC: 3.1 MMOL/L (ref 3.5–5.1)
POTASSIUM SERPL-SCNC: 3.3 MMOL/L (ref 3.5–5.1)
POTASSIUM SERPL-SCNC: 3.7 MMOL/L (ref 3.5–5.1)
PROT SERPL-MCNC: 5.9 G/DL (ref 6–8.4)
PROT SERPL-MCNC: 6 G/DL (ref 6–8.4)
PROT SERPL-MCNC: 6 G/DL (ref 6–8.4)
PROT SERPL-MCNC: 6.2 G/DL (ref 6–8.4)
RA MAJOR: 3.99 CM
RA PRESSURE: 3 MMHG
RA WIDTH: 3.2 CM
RBC # BLD AUTO: 3.35 M/UL (ref 4–5.4)
RIGHT VENTRICULAR END-DIASTOLIC DIMENSION: 2.4 CM
RV TISSUE DOPPLER FREE WALL SYSTOLIC VELOCITY 1 (APICAL 4 CHAMBER VIEW): 12.6 CM/S
SODIUM SERPL-SCNC: 149 MMOL/L (ref 136–145)
SODIUM SERPL-SCNC: 154 MMOL/L (ref 136–145)
SODIUM SERPL-SCNC: 155 MMOL/L (ref 136–145)
SODIUM SERPL-SCNC: 157 MMOL/L (ref 136–145)
TDI LATERAL: 0.08 M/S
TDI SEPTAL: 0.06 M/S
TDI: 0.07 M/S
TR MAX PG: 29 MMHG
TV REST PULMONARY ARTERY PRESSURE: 32 MMHG
WBC # BLD AUTO: 9.23 K/UL (ref 3.9–12.7)

## 2023-07-12 PROCEDURE — 97165 OT EVAL LOW COMPLEX 30 MIN: CPT

## 2023-07-12 PROCEDURE — 92526 ORAL FUNCTION THERAPY: CPT

## 2023-07-12 PROCEDURE — 85027 COMPLETE CBC AUTOMATED: CPT | Performed by: STUDENT IN AN ORGANIZED HEALTH CARE EDUCATION/TRAINING PROGRAM

## 2023-07-12 PROCEDURE — 99222 1ST HOSP IP/OBS MODERATE 55: CPT | Mod: ,,, | Performed by: PSYCHIATRY & NEUROLOGY

## 2023-07-12 PROCEDURE — 36415 COLL VENOUS BLD VENIPUNCTURE: CPT | Performed by: STUDENT IN AN ORGANIZED HEALTH CARE EDUCATION/TRAINING PROGRAM

## 2023-07-12 PROCEDURE — 97530 THERAPEUTIC ACTIVITIES: CPT

## 2023-07-12 PROCEDURE — 97161 PT EVAL LOW COMPLEX 20 MIN: CPT

## 2023-07-12 PROCEDURE — 25000003 PHARM REV CODE 250: Performed by: STUDENT IN AN ORGANIZED HEALTH CARE EDUCATION/TRAINING PROGRAM

## 2023-07-12 PROCEDURE — 87040 BLOOD CULTURE FOR BACTERIA: CPT | Mod: 59 | Performed by: STUDENT IN AN ORGANIZED HEALTH CARE EDUCATION/TRAINING PROGRAM

## 2023-07-12 PROCEDURE — 11000001 HC ACUTE MED/SURG PRIVATE ROOM

## 2023-07-12 PROCEDURE — 83735 ASSAY OF MAGNESIUM: CPT | Performed by: STUDENT IN AN ORGANIZED HEALTH CARE EDUCATION/TRAINING PROGRAM

## 2023-07-12 PROCEDURE — 87150 DNA/RNA AMPLIFIED PROBE: CPT | Mod: 59 | Performed by: STUDENT IN AN ORGANIZED HEALTH CARE EDUCATION/TRAINING PROGRAM

## 2023-07-12 PROCEDURE — 99900035 HC TECH TIME PER 15 MIN (STAT)

## 2023-07-12 PROCEDURE — 85007 BL SMEAR W/DIFF WBC COUNT: CPT | Performed by: STUDENT IN AN ORGANIZED HEALTH CARE EDUCATION/TRAINING PROGRAM

## 2023-07-12 PROCEDURE — 80053 COMPREHEN METABOLIC PANEL: CPT | Mod: 91 | Performed by: STUDENT IN AN ORGANIZED HEALTH CARE EDUCATION/TRAINING PROGRAM

## 2023-07-12 PROCEDURE — 99222 PR INITIAL HOSPITAL CARE,LEVL II: ICD-10-PCS | Mod: ,,, | Performed by: PSYCHIATRY & NEUROLOGY

## 2023-07-12 PROCEDURE — 84100 ASSAY OF PHOSPHORUS: CPT | Performed by: STUDENT IN AN ORGANIZED HEALTH CARE EDUCATION/TRAINING PROGRAM

## 2023-07-12 PROCEDURE — 63600175 PHARM REV CODE 636 W HCPCS: Performed by: STUDENT IN AN ORGANIZED HEALTH CARE EDUCATION/TRAINING PROGRAM

## 2023-07-12 RX ORDER — POTASSIUM CHLORIDE 7.45 MG/ML
10 INJECTION INTRAVENOUS
Status: DISCONTINUED | OUTPATIENT
Start: 2023-07-12 | End: 2023-07-12

## 2023-07-12 RX ORDER — POTASSIUM CHLORIDE 7.45 MG/ML
10 INJECTION INTRAVENOUS
Status: COMPLETED | OUTPATIENT
Start: 2023-07-12 | End: 2023-07-13

## 2023-07-12 RX ORDER — DEXTROSE MONOHYDRATE 50 MG/ML
INJECTION, SOLUTION INTRAVENOUS CONTINUOUS
Status: DISCONTINUED | OUTPATIENT
Start: 2023-07-12 | End: 2023-07-13

## 2023-07-12 RX ADMIN — LIDOCAINE PATCH 5% 1 PATCH: 700 PATCH TOPICAL at 09:07

## 2023-07-12 RX ADMIN — ENOXAPARIN SODIUM 30 MG: 30 INJECTION SUBCUTANEOUS at 06:07

## 2023-07-12 RX ADMIN — DEXTROSE MONOHYDRATE: 50 INJECTION, SOLUTION INTRAVENOUS at 02:07

## 2023-07-12 RX ADMIN — CEFTRIAXONE 1 G: 1 INJECTION, POWDER, FOR SOLUTION INTRAMUSCULAR; INTRAVENOUS at 09:07

## 2023-07-12 RX ADMIN — DEXTROSE MONOHYDRATE: 50 INJECTION, SOLUTION INTRAVENOUS at 10:07

## 2023-07-12 RX ADMIN — AZITHROMYCIN MONOHYDRATE 500 MG: 500 INJECTION, POWDER, LYOPHILIZED, FOR SOLUTION INTRAVENOUS at 02:07

## 2023-07-12 RX ADMIN — POTASSIUM CHLORIDE 10 MEQ: 7.46 INJECTION, SOLUTION INTRAVENOUS at 09:07

## 2023-07-12 RX ADMIN — POTASSIUM CHLORIDE 10 MEQ: 7.46 INJECTION, SOLUTION INTRAVENOUS at 11:07

## 2023-07-12 RX ADMIN — CEFTRIAXONE 1 G: 1 INJECTION, POWDER, FOR SOLUTION INTRAMUSCULAR; INTRAVENOUS at 04:07
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
## 2023-07-12 NOTE — PT/OT/SLP PROGRESS
Speech Language Pathology Treatment    Patient Name:  Nathan Simpson   MRN:  [id number]  [id number]    Admitting Diagnosis: Hypernatremia    Recommendations:                 General Recommendations:   follow up indicated  Diet recommendations:  NPO, Liquid Diet Level: NPO   Aspiration Precautions:   Frequent oral care,   Ice chips sparingly for pleasure,   and Strict aspiration precautions   General Precautions: Standard, fall, aspiration, NPO  Communication strategies:  none    Assessment:     Nathan Simpson is a 77 y.o. female with an SLP diagnosis of xerostomia and s/sx of oral and pharyngeal dysphagia.    Subjective     Patient awake, however, confusion evident. SLP communicated with RN prior to entry. RN cleared SLP to administer po trials.       Objective:     Has the patient been evaluated by SLP for swallowing?   Yes  Keep patient NPO? Yes     Patient seen for an ongoing swallowing assessment. Patient stated name and . She was oriented to being in a hospital. Noted dried bloody secretions on roof of mouth and on teeth despite oral care with RN this AM. SLP provided further oral care with oral swabs and mouth wash. Patient accepted an ice chip x1 and teaspoon sip of water x1. No pharyngeal swallows initiated despite max cues. No further trials given 2/2 high aspiration risk. SLP communicated recommendations with RN.     Goals:   Multidisciplinary Problems       SLP Goals       Not on file                    Plan:     Patient to be seen:  1 x/week   Plan of Care expires:  23  Plan of Care reviewed with:  patient   SLP Follow-Up:  Yes       Discharge recommendations:   (GOC discussion, pleasure feeds of ice chips as able)       Time Tracking:     SLP Treatment Date:   23  Speech Start Time:  1112  Speech Stop Time:  1126     Speech Total Time (min):  14 min    Billable Minutes: Treatment Swallowing Dysfunction 14    2023
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
## 2023-07-12 NOTE — PLAN OF CARE
Problem: Physical Therapy  Goal: Physical Therapy Goal  Outcome: Adequate for Care Transition     Pt is wheelchair/bed bound and requiring 24/7 care at baseline per chart. Nursing reports hypothermia recently improved. Bed level assessment performed and pt expressing pain in BLE with any movement and/or palpation. Waffle overlay placed and heel protectors donned for pressure relief. Pt may benefit from lift device and  PT to perform caregiver training. D/c PT.
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
## 2023-07-12 NOTE — NURSING
Rapid Response Nurse Follow-up Note     Followed up with patient for proactive rounding.   No acute issues at this time.Team will continue to follow.Na+ improving, hypothermia improving with Wallace Hugger. BP stable.  Please call Rapid Response RN, Jo-Ann Hunt RN with any questions or concerns at [phone].

## 2023-07-12 NOTE — PROGRESS NOTES
[facility]                   Pharmacy  Pharmacy Vancomycin/AG Sign-off    Therapy with vancomycin completed and/or consult discontinued by provider.  Pharmacy will sign off, please re-consult as needed. Thank you for allowing us to participate in this patient's care.     Kelvin Flores, PharmD    [phone]

## 2023-07-12 NOTE — PLAN OF CARE
OT kym performed this date with PT, per chart pt is wheelchair/bed bound & blind. Bed level evaluation performed this date per nsg. Pt with moaning in pain with movement on LUE/LLE. LUE flaccid upon testing. Pt able to state name &  with slurred speech & increased time/verbal cues. Waffle overlay placed on mattress for pressure relief. Pt will require 24/7 care/assistance upon d/c. PT attempting to call family with no answer, per chart pt to have Palliative consult. No further acute OT needs noted at this time, d/c OT.    Problem: Occupational Therapy  Goal: Occupational Therapy Goal  Outcome: Adequate for Care Transition
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
## 2023-07-12 NOTE — PROGRESS NOTES
RAPID RESPONSE NURSE PROACTIVE ROUNDING NOTE       Time of Visit: 1415    Called for PIV access. 20G placed in left AC via ultrasound. Nurse notified.

## 2023-07-12 NOTE — PLAN OF CARE
reviewed progress notes. Palliative Care to see patient tomorrow. Will follow-up on recommendations.     Dispo: Correction of Na, treatment of bacteremia, and GOC discussions     Patient Contacts    Name Relation Home Work Mobile   Fidelina Simpson Daughter [phone]   Hellen Simpson Daughter [phone]   Moi Simpson   [phone]     Future Appointments   Date Time Provider Department Center   7/17/2023  8:50 AM [facility] RV LAB [facility]   7/24/2023  1:00 PM Garry Mcqueen MD [facility] HEM ONC [facility]   8/9/2023  1:40 PM Garry Mcqueen MD [facility] HEM ONC [facility]       07/12/23 1613   Discharge Reassessment   Assessment Type Discharge Planning Reassessment   Did the patient's condition or plan change since previous assessment?  --    Discharge Plan discussed with:  --    Discharge Plan A   (TBD)   DME Needed Upon Discharge  other (see comments)  (TBD)   Why the patient remains in the hospital Requires continued medical care   Post-Acute Status   Discharge Delays None known at this time     Lali Woodall RN    [phone]

## 2023-07-13 LAB
ALBUMIN SERPL BCP-MCNC: 1.9 G/DL (ref 3.5–5.2)
ALBUMIN SERPL BCP-MCNC: 2 G/DL (ref 3.5–5.2)
ALBUMIN SERPL BCP-MCNC: 2.1 G/DL (ref 3.5–5.2)
ALP SERPL-CCNC: 143 U/L (ref 55–135)
ALP SERPL-CCNC: 150 U/L (ref 55–135)
ALP SERPL-CCNC: 155 U/L (ref 55–135)
ALT SERPL W/O P-5'-P-CCNC: 147 U/L (ref 10–44)
ALT SERPL W/O P-5'-P-CCNC: 68 U/L (ref 10–44)
ALT SERPL W/O P-5'-P-CCNC: 82 U/L (ref 10–44)
ANION GAP SERPL CALC-SCNC: 11 MMOL/L (ref 8–16)
ANION GAP SERPL CALC-SCNC: 11 MMOL/L (ref 8–16)
ANION GAP SERPL CALC-SCNC: 14 MMOL/L (ref 8–16)
ANISOCYTOSIS BLD QL SMEAR: SLIGHT
AST SERPL-CCNC: 185 U/L (ref 10–40)
AST SERPL-CCNC: 272 U/L (ref 10–40)
AST SERPL-CCNC: 487 U/L (ref 10–40)
BACTERIA BLD CULT: ABNORMAL
BASOPHILS NFR BLD: 0 % (ref 0–1.9)
BILIRUB SERPL-MCNC: 0.3 MG/DL (ref 0.1–1)
BUN SERPL-MCNC: 28 MG/DL (ref 8–23)
BUN SERPL-MCNC: 36 MG/DL (ref 8–23)
BUN SERPL-MCNC: 41 MG/DL (ref 8–23)
BURR CELLS BLD QL SMEAR: ABNORMAL
CALCIUM SERPL-MCNC: 7.8 MG/DL (ref 8.7–10.5)
CALCIUM SERPL-MCNC: 7.8 MG/DL (ref 8.7–10.5)
CALCIUM SERPL-MCNC: 8.1 MG/DL (ref 8.7–10.5)
CHLORIDE SERPL-SCNC: 115 MMOL/L (ref 95–110)
CHLORIDE SERPL-SCNC: 115 MMOL/L (ref 95–110)
CHLORIDE SERPL-SCNC: 118 MMOL/L (ref 95–110)
CO2 SERPL-SCNC: 13 MMOL/L (ref 23–29)
CO2 SERPL-SCNC: 14 MMOL/L (ref 23–29)
CO2 SERPL-SCNC: 14 MMOL/L (ref 23–29)
CREAT SERPL-MCNC: 0.9 MG/DL (ref 0.5–1.4)
CREAT SERPL-MCNC: 1 MG/DL (ref 0.5–1.4)
CREAT SERPL-MCNC: 1.1 MG/DL (ref 0.5–1.4)
DACRYOCYTES BLD QL SMEAR: ABNORMAL
DIFFERENTIAL METHOD: ABNORMAL
EOSINOPHIL NFR BLD: 0 % (ref 0–8)
ERYTHROCYTE [DISTWIDTH] IN BLOOD BY AUTOMATED COUNT: 19.2 % (ref 11.5–14.5)
EST. GFR  (NO RACE VARIABLE): 51 ML/MIN/1.73 M^2
EST. GFR  (NO RACE VARIABLE): 58 ML/MIN/1.73 M^2
EST. GFR  (NO RACE VARIABLE): >60 ML/MIN/1.73 M^2
GLUCOSE SERPL-MCNC: 87 MG/DL (ref 70–110)
GLUCOSE SERPL-MCNC: 94 MG/DL (ref 70–110)
GLUCOSE SERPL-MCNC: 98 MG/DL (ref 70–110)
HCT VFR BLD AUTO: 32.1 % (ref 37–48.5)
HGB BLD-MCNC: 9.6 G/DL (ref 12–16)
HYPOCHROMIA BLD QL SMEAR: ABNORMAL
IMM GRANULOCYTES # BLD AUTO: ABNORMAL K/UL (ref 0–0.04)
IMM GRANULOCYTES NFR BLD AUTO: ABNORMAL % (ref 0–0.5)
LYMPHOCYTES NFR BLD: 8 % (ref 18–48)
MAGNESIUM SERPL-MCNC: 2.8 MG/DL (ref 1.6–2.6)
MCH RBC QN AUTO: 23.9 PG (ref 27–31)
MCHC RBC AUTO-ENTMCNC: 29.9 G/DL (ref 32–36)
MCV RBC AUTO: 80 FL (ref 82–98)
MONOCYTES NFR BLD: 3 % (ref 4–15)
NEUTROPHILS NFR BLD: 86 % (ref 38–73)
NEUTS BAND NFR BLD MANUAL: 3 %
NRBC BLD-RTO: 2 /100 WBC
PHOSPHATE SERPL-MCNC: 3.3 MG/DL (ref 2.7–4.5)
PLATELET # BLD AUTO: 74 K/UL (ref 150–450)
PLATELET BLD QL SMEAR: ABNORMAL
PMV BLD AUTO: ABNORMAL FL (ref 9.2–12.9)
POIKILOCYTOSIS BLD QL SMEAR: SLIGHT
POLYCHROMASIA BLD QL SMEAR: ABNORMAL
POTASSIUM SERPL-SCNC: 3.7 MMOL/L (ref 3.5–5.1)
POTASSIUM SERPL-SCNC: 3.8 MMOL/L (ref 3.5–5.1)
POTASSIUM SERPL-SCNC: 4.6 MMOL/L (ref 3.5–5.1)
PROT SERPL-MCNC: 6.1 G/DL (ref 6–8.4)
PROT SERPL-MCNC: 6.5 G/DL (ref 6–8.4)
PROT SERPL-MCNC: 6.6 G/DL (ref 6–8.4)
RBC # BLD AUTO: 4.02 M/UL (ref 4–5.4)
SODIUM SERPL-SCNC: 140 MMOL/L (ref 136–145)
SODIUM SERPL-SCNC: 140 MMOL/L (ref 136–145)
SODIUM SERPL-SCNC: 145 MMOL/L (ref 136–145)
TOXIC GRANULES BLD QL SMEAR: PRESENT
WBC # BLD AUTO: 13.63 K/UL (ref 3.9–12.7)

## 2023-07-13 PROCEDURE — 99900035 HC TECH TIME PER 15 MIN (STAT)

## 2023-07-13 PROCEDURE — 94761 N-INVAS EAR/PLS OXIMETRY MLT: CPT

## 2023-07-13 PROCEDURE — 1152F PR DOC ADVANCED DISEASE DX, GOAL OF CARE PRIORITIZE COMFORT: ICD-10-PCS | Mod: CPTII,,, | Performed by: STUDENT IN AN ORGANIZED HEALTH CARE EDUCATION/TRAINING PROGRAM

## 2023-07-13 PROCEDURE — 97535 SELF CARE MNGMENT TRAINING: CPT

## 2023-07-13 PROCEDURE — 63600175 PHARM REV CODE 636 W HCPCS: Performed by: STUDENT IN AN ORGANIZED HEALTH CARE EDUCATION/TRAINING PROGRAM

## 2023-07-13 PROCEDURE — 27000221 HC OXYGEN, UP TO 24 HOURS

## 2023-07-13 PROCEDURE — G0316 PR PROLONG INPT EVAL EA ADDL 15 MIN: HCPCS | Mod: ,,, | Performed by: STUDENT IN AN ORGANIZED HEALTH CARE EDUCATION/TRAINING PROGRAM

## 2023-07-13 PROCEDURE — 99223 PR INITIAL HOSPITAL CARE,LEVL III: ICD-10-PCS | Mod: 25,,, | Performed by: STUDENT IN AN ORGANIZED HEALTH CARE EDUCATION/TRAINING PROGRAM

## 2023-07-13 PROCEDURE — 11000001 HC ACUTE MED/SURG PRIVATE ROOM

## 2023-07-13 PROCEDURE — G0316 PR PROLONG INPT EVAL EA ADDL 15 MIN: ICD-10-PCS | Mod: ,,, | Performed by: STUDENT IN AN ORGANIZED HEALTH CARE EDUCATION/TRAINING PROGRAM

## 2023-07-13 PROCEDURE — 1152F DOC ADVNCD DIS COMFORT 1ST: CPT | Mod: CPTII,,, | Performed by: STUDENT IN AN ORGANIZED HEALTH CARE EDUCATION/TRAINING PROGRAM

## 2023-07-13 PROCEDURE — 83735 ASSAY OF MAGNESIUM: CPT | Performed by: STUDENT IN AN ORGANIZED HEALTH CARE EDUCATION/TRAINING PROGRAM

## 2023-07-13 PROCEDURE — 36415 COLL VENOUS BLD VENIPUNCTURE: CPT | Performed by: STUDENT IN AN ORGANIZED HEALTH CARE EDUCATION/TRAINING PROGRAM

## 2023-07-13 PROCEDURE — 85025 COMPLETE CBC W/AUTO DIFF WBC: CPT | Performed by: STUDENT IN AN ORGANIZED HEALTH CARE EDUCATION/TRAINING PROGRAM

## 2023-07-13 PROCEDURE — 25000003 PHARM REV CODE 250: Performed by: STUDENT IN AN ORGANIZED HEALTH CARE EDUCATION/TRAINING PROGRAM

## 2023-07-13 PROCEDURE — 1158F ADVNC CARE PLAN TLK DOCD: CPT | Mod: CPTII,,, | Performed by: STUDENT IN AN ORGANIZED HEALTH CARE EDUCATION/TRAINING PROGRAM

## 2023-07-13 PROCEDURE — 84100 ASSAY OF PHOSPHORUS: CPT | Performed by: STUDENT IN AN ORGANIZED HEALTH CARE EDUCATION/TRAINING PROGRAM

## 2023-07-13 PROCEDURE — 99497 ADVNCD CARE PLAN 30 MIN: CPT | Mod: 25,,, | Performed by: STUDENT IN AN ORGANIZED HEALTH CARE EDUCATION/TRAINING PROGRAM

## 2023-07-13 PROCEDURE — S5010 5% DEXTROSE AND 0.45% SALINE: HCPCS | Performed by: STUDENT IN AN ORGANIZED HEALTH CARE EDUCATION/TRAINING PROGRAM

## 2023-07-13 PROCEDURE — 99497 PR ADVNCD CARE PLAN 30 MIN: ICD-10-PCS | Mod: 25,,, | Performed by: STUDENT IN AN ORGANIZED HEALTH CARE EDUCATION/TRAINING PROGRAM

## 2023-07-13 PROCEDURE — 63600175 PHARM REV CODE 636 W HCPCS

## 2023-07-13 PROCEDURE — 92526 ORAL FUNCTION THERAPY: CPT

## 2023-07-13 PROCEDURE — 80053 COMPREHEN METABOLIC PANEL: CPT | Mod: 91 | Performed by: STUDENT IN AN ORGANIZED HEALTH CARE EDUCATION/TRAINING PROGRAM

## 2023-07-13 PROCEDURE — 1158F PR ADVANCE CARE PLANNING DISCUSS DOCUMENTED IN MEDICAL RECORD: ICD-10-PCS | Mod: CPTII,,, | Performed by: STUDENT IN AN ORGANIZED HEALTH CARE EDUCATION/TRAINING PROGRAM

## 2023-07-13 PROCEDURE — 99223 1ST HOSP IP/OBS HIGH 75: CPT | Mod: 25,,, | Performed by: STUDENT IN AN ORGANIZED HEALTH CARE EDUCATION/TRAINING PROGRAM

## 2023-07-13 RX ORDER — ENOXAPARIN SODIUM 100 MG/ML
1 INJECTION SUBCUTANEOUS EVERY 12 HOURS
Status: DISCONTINUED | OUTPATIENT
Start: 2023-07-13 | End: 2023-07-17

## 2023-07-13 RX ORDER — SODIUM CHLORIDE, SODIUM LACTATE, POTASSIUM CHLORIDE, CALCIUM CHLORIDE 600; 310; 30; 20 MG/100ML; MG/100ML; MG/100ML; MG/100ML
INJECTION, SOLUTION INTRAVENOUS CONTINUOUS
Status: DISCONTINUED | OUTPATIENT
Start: 2023-07-13 | End: 2023-07-14

## 2023-07-13 RX ORDER — DEXTROSE MONOHYDRATE AND SODIUM CHLORIDE 5; .45 G/100ML; G/100ML
INJECTION, SOLUTION INTRAVENOUS CONTINUOUS
Status: DISCONTINUED | OUTPATIENT
Start: 2023-07-13 | End: 2023-07-13

## 2023-07-13 RX ORDER — MORPHINE SULFATE 2 MG/ML
2 INJECTION, SOLUTION INTRAMUSCULAR; INTRAVENOUS EVERY 6 HOURS PRN
Status: DISCONTINUED | OUTPATIENT
Start: 2023-07-13 | End: 2023-07-14

## 2023-07-13 RX ADMIN — POTASSIUM CHLORIDE 10 MEQ: 7.46 INJECTION, SOLUTION INTRAVENOUS at 01:07

## 2023-07-13 RX ADMIN — ENOXAPARIN SODIUM 50 MG: 60 INJECTION SUBCUTANEOUS at 04:07

## 2023-07-13 RX ADMIN — MORPHINE SULFATE 2 MG: 2 INJECTION, SOLUTION INTRAMUSCULAR; INTRAVENOUS at 12:07

## 2023-07-13 RX ADMIN — SODIUM CHLORIDE, POTASSIUM CHLORIDE, SODIUM LACTATE AND CALCIUM CHLORIDE: 600; 310; 30; 20 INJECTION, SOLUTION INTRAVENOUS at 01:07

## 2023-07-13 RX ADMIN — SODIUM CHLORIDE, POTASSIUM CHLORIDE, SODIUM LACTATE AND CALCIUM CHLORIDE: 600; 310; 30; 20 INJECTION, SOLUTION INTRAVENOUS at 11:07

## 2023-07-13 RX ADMIN — CEFTRIAXONE SODIUM 2 G: 2 INJECTION, POWDER, FOR SOLUTION INTRAMUSCULAR; INTRAVENOUS at 08:07

## 2023-07-13 RX ADMIN — LIDOCAINE PATCH 5% 1 PATCH: 700 PATCH TOPICAL at 08:07

## 2023-07-13 RX ADMIN — DEXTROSE AND SODIUM CHLORIDE: 5; 450 INJECTION, SOLUTION INTRAVENOUS at 11:07

## 2023-07-13 RX ADMIN — POTASSIUM CHLORIDE 10 MEQ: 7.46 INJECTION, SOLUTION INTRAVENOUS at 02:07

## 2023-07-13 RX ADMIN — AZITHROMYCIN MONOHYDRATE 500 MG: 500 INJECTION, POWDER, LYOPHILIZED, FOR SOLUTION INTRAVENOUS at 11:07

## 2023-07-13 NOTE — PROGRESS NOTES
[facility] Progress Note    Nathan Simpson  1945  [id number]      Subjective:   Nathan Simpson is a 77 y.o. 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 Upon arrival was found to have hypernatremia, CTH and MRI reveals moderate size region of signal abnormality right corona radiata extending to the basal ganglia. Blood culture positive with  Enterobacteria, and klebsiella. Patient with history of stroke in May. Given history, lab findings and image, patient symptoms is likely recrudescence in the setting of poor oral intake. Sodium level today normal    Today patient doing well, answering questions and following command. No event overnight, Vitals stable and breathing on RA. Not moving LUE and minimally moving LLE. Deny any headache, nausea, vomit.       ROS: Negative except as above  Objective:  Vitals:    07/13/23 0843   BP:    Pulse: 90   Resp: 19   Temp:      Scheduled Meds:   acetaminophen  650 mg Rectal Once    azithromycin  500 mg Intravenous Q24H    cefTRIAXone (ROCEPHIN) IVPB  2 g Intravenous Q24H    enoxparin  30 mg Subcutaneous Q24H (treatment, non-standard time)    LIDOcaine  1 patch Transdermal Q24H       Neurologic Physical Examination:   Orientation  Alert, awake, oriented to self,but not to place, time  Language  No dysarthria, no aphasia  Cranial Nerves  Patient is blind,hearing grossly intact, SCM & TPZ 5/5, tongue midline,   Motor  Normal Tone  4/5 strength in RUE and RLE extremities  0/5 strength in LUE and 1/5 in LLE  Sensory  Withdraws to painful stimuli  DTR   +2 symmetric  Cerebellar/Gait  Did not assess    Laboratory Findings:   Recent Results (from the past 24 hour(s))   Comprehensive metabolic panel    Collection Time: 07/12/23  2:25 PM   Result Value Ref Range    Sodium 154 (H) 136 - 145 mmol/L    Potassium 3.1 (L) 3.5 - 5.1 mmol/L    Chloride 123 (H)  95 - 110 mmol/L    CO2 20 (L) 23 - 29 mmol/L    Glucose 126 (H) 70 - 110 mg/dL    BUN 60 (H) 8 - 23 mg/dL    Creatinine 1.3 0.5 - 1.4 mg/dL    Calcium 8.1 (L) 8.7 - 10.5 mg/dL    Total Protein 6.0 6.0 - 8.4 g/dL    Albumin 1.9 (L) 3.5 - 5.2 g/dL    Total Bilirubin 0.3 0.1 - 1.0 mg/dL    Alkaline Phosphatase 144 (H) 55 - 135 U/L     (H) 10 - 40 U/L    ALT 55 (H) 10 - 44 U/L    eGFR 42 (A) >60 mL/min/1.73 m^2    Anion Gap 11 8 - 16 mmol/L   Blood culture    Collection Time: 07/12/23  2:25 PM    Specimen: Blood   Result Value Ref Range    Blood Culture, Routine No Growth to date    Echo Saline Bubble? No    Collection Time: 07/12/23  3:23 PM   Result Value Ref Range    BSA 1.57 m2    TDI SEPTAL 0.06 m/s    LV LATERAL E/E' RATIO 8.13 m/s    LV SEPTAL E/E' RATIO 10.83 m/s    LA WIDTH 2.20 cm    IVC diameter 1.33 cm    Left Ventricular Outflow Tract Mean Velocity 0.49 cm/s    Left Ventricular Outflow Tract Mean Gradient 1.09 mmHg    TDI LATERAL 0.08 m/s    LVIDd 4.29 3.5 - 6.0 cm    IVS 0.80 0.6 - 1.1 cm    Posterior Wall 0.80 (A) 0.6 - 1.1 cm    LVIDs 2.00 2.1 - 4.0 cm    FS 53 28 - 44 %    LA volume 18.32 cm3    LV mass 104.92 g    LA size 2.60 cm    RV S' 12.60 cm/s    Left Ventricle Relative Wall Thickness 0.37 cm    AV mean gradient 2 mmHg    AV valve area 2.86 cm2    AV Velocity Ratio 0.73     AV index (prosthetic) 0.91     MV mean gradient 1 mmHg    MV valve area p 1/2 method 2.29 cm2    MV valve area by continuity eq 1.62 cm2    E/A ratio 0.84     Mean e' 0.07 m/s    E wave deceleration time 355.95 msec    LVOT diameter 2.00 cm    LVOT area 3.1 cm2    LVOT peak jcarlos 0.80 m/s    LVOT peak VTI 13.30 cm    Ao peak jcarlos 1.09 m/s    Ao VTI 14.6 cm    Mr max jcarlos 5.56 m/s    LVOT stroke volume 41.76 cm3    AV peak gradient 5 mmHg    MV peak gradient 4 mmHg    E/E' ratio 9.29 m/s    MV Peak E Jcarlos 0.65 m/s    AR Max Jcarlos 1.28 m/s    TR Max Jcarlos 2.71 m/s    MV VTI 25.7 cm    MV stenosis pressure 1/2 time 96.15 ms    MV  Peak A Jcarlos 0.77 m/s    LV Systolic Volume 27.28 mL    LV Systolic Volume Index 16.7 mL/m2    LV Diastolic Volume 82.62 mL    LV Diastolic Volume Index 50.69 mL/m2    LA Volume Index 11.2 mL/m2    LV Mass Index 64 g/m2    RA Major Axis 3.99 cm    Left Atrium Minor Axis 4.00 cm    Left Atrium Major Axis 3.56 cm    Triscuspid Valve Regurgitation Peak Gradient 29 mmHg    LA Volume Index (Mod) 10.8 mL/m2    LA volume (mod) 17.60 cm3    Pappas's Biplane MOD Ejection Fraction 5 %    Right Atrial Pressure (from IVC) 3 mmHg    EF 55 %    Ao root annulus 2.60 cm    RVDD 2.40 cm    TV rest pulmonary artery pressure 32 mmHg    RA Width 3.20 cm   Blood culture    Collection Time: 07/12/23  4:14 PM    Specimen: Blood   Result Value Ref Range    Blood Culture, Routine No Growth to date    Comprehensive metabolic panel    Collection Time: 07/12/23  8:18 PM   Result Value Ref Range    Sodium 149 (H) 136 - 145 mmol/L    Potassium 3.1 (L) 3.5 - 5.1 mmol/L    Chloride 121 (H) 95 - 110 mmol/L    CO2 15 (L) 23 - 29 mmol/L    Glucose 109 70 - 110 mg/dL    BUN 52 (H) 8 - 23 mg/dL    Creatinine 1.2 0.5 - 1.4 mg/dL    Calcium 8.0 (L) 8.7 - 10.5 mg/dL    Total Protein 6.0 6.0 - 8.4 g/dL    Albumin 2.0 (L) 3.5 - 5.2 g/dL    Total Bilirubin 0.3 0.1 - 1.0 mg/dL    Alkaline Phosphatase 126 55 - 135 U/L     (H) 10 - 40 U/L    ALT 67 (H) 10 - 44 U/L    eGFR 46 (A) >60 mL/min/1.73 m^2    Anion Gap 13 8 - 16 mmol/L   Phosphorus    Collection Time: 07/13/23  6:01 AM   Result Value Ref Range    Phosphorus 3.3 2.7 - 4.5 mg/dL   Magnesium    Collection Time: 07/13/23  6:01 AM   Result Value Ref Range    Magnesium 2.8 (H) 1.6 - 2.6 mg/dL   Comprehensive Metabolic Panel    Collection Time: 07/13/23  6:01 AM   Result Value Ref Range    Sodium 145 136 - 145 mmol/L    Potassium 3.7 3.5 - 5.1 mmol/L    Chloride 118 (H) 95 - 110 mmol/L    CO2 13 (L) 23 - 29 mmol/L    Glucose 98 70 - 110 mg/dL    BUN 41 (H) 8 - 23 mg/dL    Creatinine 1.1 0.5 - 1.4 mg/dL     Calcium 7.8 (L) 8.7 - 10.5 mg/dL    Total Protein 6.1 6.0 - 8.4 g/dL    Albumin 1.9 (L) 3.5 - 5.2 g/dL    Total Bilirubin 0.3 0.1 - 1.0 mg/dL    Alkaline Phosphatase 143 (H) 55 - 135 U/L     (H) 10 - 40 U/L    ALT 68 (H) 10 - 44 U/L    eGFR 51 (A) >60 mL/min/1.73 m^2    Anion Gap 14 8 - 16 mmol/L   CBC Auto Differential    Collection Time: 07/13/23  6:01 AM   Result Value Ref Range    WBC 13.63 (H) 3.90 - 12.70 K/uL    RBC 4.02 4.00 - 5.40 M/uL    Hemoglobin 9.6 (L) 12.0 - 16.0 g/dL    Hematocrit 32.1 (L) 37.0 - 48.5 %    MCV 80 (L) 82 - 98 fL    MCH 23.9 (L) 27.0 - 31.0 pg    MCHC 29.9 (L) 32.0 - 36.0 g/dL    RDW 19.2 (H) 11.5 - 14.5 %    Platelets 74 (L) 150 - 450 K/uL    MPV SEE COMMENT 9.2 - 12.9 fL    Immature Granulocytes CANCELED 0.0 - 0.5 %    Immature Grans (Abs) CANCELED 0.00 - 0.04 K/uL    nRBC 2 (A) 0 /100 WBC    Gran % 86.0 (H) 38.0 - 73.0 %    Lymph % 8.0 (L) 18.0 - 48.0 %    Mono % 3.0 (L) 4.0 - 15.0 %    Eosinophil % 0.0 0.0 - 8.0 %    Basophil % 0.0 0.0 - 1.9 %    Bands 3.0 %    Platelet Estimate Decreased (A)     [place] Slight     Poik Slight     Poly Occasional     Hypo Occasional     Tear Drop Cells Occasional     [place] Cells Moderate     Toxic Granulation Present     Differential Method Automated    Comprehensive metabolic panel    Collection Time: 07/13/23  9:15 AM   Result Value Ref Range    Sodium 140 136 - 145 mmol/L    Potassium 4.6 3.5 - 5.1 mmol/L    Chloride 115 (H) 95 - 110 mmol/L    CO2 14 (L) 23 - 29 mmol/L    Glucose 94 70 - 110 mg/dL    BUN 36 (H) 8 - 23 mg/dL    Creatinine 1.0 0.5 - 1.4 mg/dL    Calcium 7.8 (L) 8.7 - 10.5 mg/dL    Total Protein 6.5 6.0 - 8.4 g/dL    Albumin 2.0 (L) 3.5 - 5.2 g/dL    Total Bilirubin 0.3 0.1 - 1.0 mg/dL    Alkaline Phosphatase 150 (H) 55 - 135 U/L     (H) 10 - 40 U/L    ALT 82 (H) 10 - 44 U/L    eGFR 58 (A) >60 mL/min/1.73 m^2    Anion Gap 11 8 - 16 mmol/L       Neuroimaging:   CT Head Without Contrast    Result Date:  7/10/2023  EXAMINATION: CT HEAD WITHOUT CONTRAST CLINICAL HISTORY: Mental status change, unknown cause; TECHNIQUE: Low dose axial images were obtained through the head.  Coronal and sagittal reformations were also performed. Contrast was not administered. COMPARISON: 06/07/2023 FINDINGS: There is motion artifact. There is generalized cerebral volume loss.  There is hypoattenuation in a periventricular fashion, likely sequela of chronic microvascular ischemic change. There is patchy hypoattenuation involving the right corona radiata, not seen on the previous examination.  There is encephalomalacia involving the left inferior temporal region, stable.  There is questionable low attenuation involving the right paramedian cerebellum versus artifact.  There is no evidence of acute major vascular territory infarct, hemorrhage, or mass.  There is no hydrocephalus.  There are no abnormal extra-axial fluid collections.  The paranasal sinuses and mastoid air cells are clear, and there is no evidence of calvarial fracture.  The visualized soft tissues are unremarkable.  Surgical change noted of the left lobe.     This report was flagged in Epic as abnormal. 1. Interval development of patchy hypoattenuation involving the right corona radiata, recent infarct is of concern, correlation with current symptomatology advised. 2. There is low-attenuation along the right paramedian cerebellum.  This is suspected to reflect motion artifact however additional focus of infarct not excluded. 3. Stable left inferior temporal encephalomalacia. 4. Sequela of chronic microvascular ischemic change and senescent change. Electronically signed by: Justino Orantes MD Date:    07/10/2023 Time:    16:54    MRI Brain Without Contrast    Result Date: 7/11/2023  EXAMINATION: MRI BRAIN WITHOUT CONTRAST CLINICAL HISTORY: Stroke, follow up;  Other cerebral infarction TECHNIQUE: Sagittal and axial T1, axial T2, axial FLAIR, axial gradient and axial diffusion  imaging of the whole brain without contrast COMPARISON: 07/10/2023 FINDINGS: There is moderate sized region of restricted diffusion within the right corona radiata extending to the basal ganglia corresponding to hypodensity seen on yesterday CT in compatible with acute/recent infarction.  There is corresponding T2 flair signal hyperintensity in this region.  No corresponding parenchymal susceptibility to suggest parenchymal hemorrhage allowing for artifact from motion There is superimposed moderate region of encephalomalacia left temporal lobe with smaller component in the right anterior temporal lobe and left inferior frontal lobe concerning for sequela of prior trauma or prior vascular event. Small regions of encephalomalacia right basal ganglia suggestive for prior infarcts Study is distorted by motion artifacts.  Major intracranial skull base T2 flow voids are present This report was flagged in Epic as abnormal.     Moderate size region of signal abnormality right corona radiata extending to the basal ganglia corresponding to hypodensity seen on CT compatible with acute/recent infarcts. Superimposed moderate to large left temporal and smaller left inferior frontal and right anterior temporal areas of encephalomalacia which may be sequela of prior trauma or additional prior infarcts There are small remote right basal gangliar infarcts Otherwise motion distorted examination as detailed above Electronically signed by: Sameer Walters DO Date:    07/11/2023 Time:    13:47    X-Ray Chest AP Portable    Result Date: 7/10/2023  EXAMINATION: XR CHEST AP PORTABLE CLINICAL HISTORY: Sepsis; FINDINGS: Heart size is normal.  There is aortic plaque.  There is mild perihilar edema.  There is right upper lobe scar.  There is DJD.     Possible mild interstitial edema versus interstitial pneumonia. Electronically signed by: aSmuel Matias MD Date:    07/10/2023 Time:    15:34    Echo Saline Bubble? No    Result Date: 7/12/2023  ·  The left ventricle is normal in size with normal systolic function. · The estimated ejection fraction is 55%. · Normal left ventricular diastolic function. · Normal right ventricular size with normal right ventricular systolic function. · The estimated PA systolic pressure is 32 mmHg. · Normal central venous pressure (3 mmHg).      CT Chest Abdomen Pelvis With Contrast (xpd)    Result Date: 7/10/2023  EXAMINATION: CT CHEST ABDOMEN PELVIS WITH CONTRAST (XPD) CLINICAL HISTORY: Sepsis; TECHNIQUE: Arterial phase axial CT images were obtained through the chest, abdomen and pelvis following IV administration of 75 mL of Omnipaque 350 contrast. Coronal, sagittal and 3D reconstructions were submitted and interpreted. Dose reduction techniques including automatic exposure control (AEC) were utilized. COMPARISON: None FINDINGS: The thoracic aorta is normal in caliber air.  Soft atherosclerotic plaque is noted throughout the descending thoracic aorta with a shaggy configuration.  No dissection. The shaggy atherosclerotic plaque extends into the abdominal aorta with up to 50% diameter stenosis.  No dissection or ulceration.  Takeoff of the celiac, SMA and renal arteries appear preserved. Base of Neck: No significant abnormality. Thoracic soft tissues: Unremarkable. Heart: Normal size. No effusion. Pulmonary vasculature: Pulmonary arteries distribute normally.  There are four pulmonary veins. Kendra/Mediastinum: Adenopathy in the infrahilar region on the right up to 12 mm with hilar nodule seen in the central right hilum measuring 2.3 cm adjacent to the pulmonary artery. Esophagus: Unremarkable. Airways: Patent. Lungs/Pleura: Consolidation is now present in the lower lobe on the RIGHT.  A spiculated masslike density with consolidation distal is noted in the medial segment of the LEFT lower lobe with the spiculated mass measuring 2.4 by 2.8 x 2.4 cm.  Scattered emphysematous and fibrotic changes are noted.  Calcified granuloma in  the right lung apex. Liver: Stable Gallbladder: No calcified gallstones. Bile Ducts: No evidence of dilated ducts. Pancreas: No mass or peripancreatic fat stranding.  Prominence of the pancreatic duct unchanged. Spleen: Unremarkable. Adrenals: Unremarkable. Kidneys/ Ureters: No radiodense stones or hydronephrosis.  The ureters are normal in course and caliber.  Large exophytic cyst off the left kidney.  There is a low low-density in the superior pole of the left kidney not present on the prior exam.  It measures approximately 2.2 cm. Bladder: No evidence of wall thickening. Reproductive organs: Unremarkable. GI Tract/Mesentery: Prominent waste material throughout the colon with moderate rectal fecal impaction.  No evidence of stercoral colitis. Peritoneal Space: No ascites. No free air. Retroperitoneum: No significant adenopathy. Abdominal wall: Unremarkable. Bones: No acute fracture or aggressive-appearing lytic.  Sclerotic L1 lesion is again noted unchanged.  Deformity of the iliac bone with scattered areas of osteopenia..     Increasing consolidation in the right lung base.  Correlate for simple pneumonia versus postobstructive pneumonitis. Increasing nodule/lymph node enlargement in the right hilum.  Correlate for neoplastic versus metastatic lymph nodes. Spiculated mass in the left lower lobe of the lung suspicious for lung neoplasm.  Synchronous or metachronous lesion to the right lower lobe is question. Shaggy aorta with pronounced soft plaque throughout the thoracic and abdominal aorta without ulceration, dissection or occlusion. Stable low-density lesion in the liver and atrophy of the spleen with prominence of the splenic duct. New low-density in the upper pole of the left kidney.  Correlate for focal nephritis.  Focal neoplasm is not excluded. Rectal fecal impaction without stercoral colitis. This report was flagged in Epic as abnormal. Electronically signed by: Jose Cruz Zaidi Date:    07/10/2023  Time:    17:34    Assessment/Plan:     Nathan Simpson is a 77 y.o. 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 Upon arrival was found to have hypernatremia, CTH and MRI reveals moderate size region of signal abnormality right corona radiata extending to the basal ganglia. Blood culture positive with  Enterobacteria, and klebsiella. Patient with history of stroke in May. Given history, lab findings and image, patient symptoms is likely recrudescence in the setting of poor oral intake. Sodium level trending down with IVF.     Plan  -continue electrolyte correction  -CTH and MRI reveals Moderate size region of signal abnormality right corona radiata extending to the basal ganglia   -Patient on eliquis and aspirin.  -Continue aspirin, eliquis and Lipitor for cholesterol  -TTE with EF of 55%  -can obtain B.12, folate,level,TSH wnl    Emory Eduarod MD  [facility] PGY-4  [facility] Consult Service
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
## 2023-07-13 NOTE — PLAN OF CARE
Problem: Adult Inpatient Plan of Care  Goal: Plan of Care Review  Outcome: Ongoing, Progressing     Problem: Fall Injury Risk  Goal: Absence of Fall and Fall-Related Injury  Outcome: Ongoing, Progressing     Problem: Fluid and Electrolyte Imbalance (Acute Kidney Injury/Impairment)  Goal: Fluid and Electrolyte Balance  Outcome: Ongoing, Progressing

## 2023-07-13 NOTE — PT/OT/SLP PROGRESS
"Speech Language Pathology Treatment    Patient Name:  Nathan Smipson   MRN:  [id number]  [id number]    Admitting Diagnosis: Hypernatremia    Recommendations:                 General Recommendations:   follow up indicated  Diet recommendations:  NPO, Liquid Diet Level: NPO   Aspiration Precautions:   Frequent oral care,   Ice chips sparingly for pleasure,   and Strict aspiration precautions   General Precautions: Standard, NPO, fall, aspiration  Communication strategies:  none    Should patient/family/MD wish to continue an oral diet despite possible aspiration risk, this SLP feels the safest oral diet to be puree and nectar thick liquids. Please note altered diet and precautions may reduce, but not eliminate aspiration risk. Patient remains at a risk to aspirate all po intake.  Aspiration Precautions: (strict)   1 SMALL bite/sip at a time at a slow rate (ensure patient swallows prior to giving another bite/sip-feel throat for swallow)  *Recommend utilizing metal utensils only 2/2 patient biting down on utensils  Assistance with meals and assistance with thickening liquids,    Check for pocketing/oral residue,    Eliminate distractions and feed only when awake/alert,   EXCELLENT AND FREQUENT ORAL CARE,  HOB to 90 degrees and remain upright 30 mintues s/p meals,  Meds crushed in puree,   Continue to monitor for signs and symptoms of aspiration and discontinue oral feeding should you notice any of the following: watery eyes, reddened facial area, wet vocal quality, increased work of breathing, change in respiratory status, increased congestion, coughing, fever, etc.      Assessment:     Nathan Simpson is a 77 y.o. female with an SLP diagnosis of xerostomia and s/sx of oral and pharyngeal dysphagia.    Subjective     Patient awake, however, confusion evident.   Patient with tangential speech intermittently throughout assessment.  Patient states, "Your big boy was just in here, Talking to me. Explaining things to " "me."      Objective:     Has the patient been evaluated by SLP for swallowing?   Yes  Keep patient NPO? Yes     Patient seen for an ongoing swallowing assessment. SLP provided oral care with toothbrush, toothpaste, oral swabs, mouthwash, and suctioning. Patient biting down on oral swabs, toothbrush, and yankauer. Patient accepted ice chips x2, teaspoon sip of water attempted, cup sip of water attempted, and 1/4 teaspoon bite of apple sauce attempted. Patient with no pharyngeal swallow despite cues.  She clamped her mouth closed with teaspoon of water trial. Further water trials fell out of oral cavity anteriorally. Oral suctioning provided s/p all trials. No fruther trials administered 2/2 aspiration risk. SLP provided patient education on SLP role, goals, and POC. Patient would benefit from reinforcement.     Goals:   Multidisciplinary Problems       SLP Goals       Not on file                    Plan:     Patient to be seen:  3 x/week   Plan of Care expires:  08/09/23  Plan of Care reviewed with:  patient   SLP Follow-Up:  Yes       Discharge recommendations:   (GOC discussion, pleasure feeds of ice chips as able)       Time Tracking:     SLP Treatment Date:   07/13/23  Speech Start Time:  0902  Speech Stop Time:  0924     Speech Total Time (min):  22 min    Billable Minutes: Treatment Swallowing Dysfunction 11  Education 11    07/13/2023  "
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
## 2023-07-13 NOTE — PLAN OF CARE
"   07/13/23 1442   Post-Acute Status   Post-Acute Authorization Home Health;Hospice   Home Health Status Pending medical clearance/testing   Hospice Status Pending medical clearance/testing   Discharge Plan   Discharge Plan A Home;Home with family   Discharge Plan B Home;Home with family;Hospice/home       No family at the bedside. Spoke with son via telephone. Spoke with palliative. POA is out of town and pt lives with POA. No one is at the home until Sunday. Moi reports he would be willing to have her DC to his home if needed if DC prior to POA's return. VM left with Fidelina. Unable to leave VM with Hellen. Will need full family conversation about hospice. NO hospice referral recommended just yet. Will need full family decision. Not medically ready for DC.    Future Appointments   Date Time Provider Department Center   7/17/2023  8:50 AM LAB, [facility] RVPH LAB [facility]   7/24/2023  1:00 PM Garry Mcqueen MD [facility] HEM ONC [facility]   8/9/2023  1:40 PM Garry Mcqueen MD [facility] HEM ONC [facility]     BP (!) 115/59 (BP Location: Right arm, Patient Position: Lying)   Pulse 77   Temp 96.5 °F (35.8 °C) (Oral)   Resp 18   Ht 5' 11" (1.803 m)   Wt 49.5 kg (109 lb 2 oz)   SpO2 100%   BMI 15.22 kg/m²      acetaminophen  650 mg Rectal Once    azithromycin  500 mg Intravenous Q24H    cefTRIAXone (ROCEPHIN) IVPB  2 g Intravenous Q24H    enoxparin  1 mg/kg Subcutaneous Q12H (treatment, non-standard time)    LIDOcaine  1 patch Transdermal Q24H     Consults (From admission, onward)          Status Ordering Provider     Inpatient consult to Palliative Care  Once        Provider:  Bill Lawson Jr., MD    Acknowledged ARSENIO HAWTHORNE     Inpatient consult to Hematology/Oncology  Once        Provider:  Garry Mcqueen MD    Completed ARSENIO HAWTHORNE     Inpatient consult to [facility]  Once        Provider:  Yuriy Garcia MD    Completed ARSENIO HAWTHORNE     IP consult to case management  Once        Provider:  (Not yet " assigned)    Completed BABATUNDE CHAN

## 2023-07-14 LAB
ALBUMIN SERPL BCP-MCNC: 1.8 G/DL (ref 3.5–5.2)
ALP SERPL-CCNC: 138 U/L (ref 55–135)
ALT SERPL W/O P-5'-P-CCNC: 110 U/L (ref 10–44)
ANION GAP SERPL CALC-SCNC: 11 MMOL/L (ref 8–16)
ANISOCYTOSIS BLD QL SMEAR: SLIGHT
AST SERPL-CCNC: 290 U/L (ref 10–40)
BASOPHILS # BLD AUTO: 0.04 K/UL (ref 0–0.2)
BASOPHILS NFR BLD: 0.3 % (ref 0–1.9)
BILIRUB SERPL-MCNC: 0.3 MG/DL (ref 0.1–1)
BUN SERPL-MCNC: 26 MG/DL (ref 8–23)
BURR CELLS BLD QL SMEAR: ABNORMAL
CALCIUM SERPL-MCNC: 7.8 MG/DL (ref 8.7–10.5)
CHLORIDE SERPL-SCNC: 116 MMOL/L (ref 95–110)
CO2 SERPL-SCNC: 18 MMOL/L (ref 23–29)
CREAT SERPL-MCNC: 0.8 MG/DL (ref 0.5–1.4)
DIFFERENTIAL METHOD: ABNORMAL
EOSINOPHIL # BLD AUTO: 0.2 K/UL (ref 0–0.5)
EOSINOPHIL NFR BLD: 1.6 % (ref 0–8)
ERYTHROCYTE [DISTWIDTH] IN BLOOD BY AUTOMATED COUNT: 18.9 % (ref 11.5–14.5)
EST. GFR  (NO RACE VARIABLE): >60 ML/MIN/1.73 M^2
GLUCOSE SERPL-MCNC: 81 MG/DL (ref 70–110)
HCT VFR BLD AUTO: 25.3 % (ref 37–48.5)
HGB BLD-MCNC: 7.5 G/DL (ref 12–16)
HYPOCHROMIA BLD QL SMEAR: ABNORMAL
IMM GRANULOCYTES # BLD AUTO: 0.18 K/UL (ref 0–0.04)
IMM GRANULOCYTES NFR BLD AUTO: 1.5 % (ref 0–0.5)
LYMPHOCYTES # BLD AUTO: 1.1 K/UL (ref 1–4.8)
LYMPHOCYTES NFR BLD: 8.9 % (ref 18–48)
MAGNESIUM SERPL-MCNC: 2.7 MG/DL (ref 1.6–2.6)
MCH RBC QN AUTO: 23.4 PG (ref 27–31)
MCHC RBC AUTO-ENTMCNC: 29.6 G/DL (ref 32–36)
MCV RBC AUTO: 79 FL (ref 82–98)
MONOCYTES # BLD AUTO: 0.4 K/UL (ref 0.3–1)
MONOCYTES NFR BLD: 3.6 % (ref 4–15)
NEUTROPHILS # BLD AUTO: 10.4 K/UL (ref 1.8–7.7)
NEUTROPHILS NFR BLD: 84.1 % (ref 38–73)
NRBC BLD-RTO: 1 /100 WBC
OVALOCYTES BLD QL SMEAR: ABNORMAL
PHOSPHATE SERPL-MCNC: 3.5 MG/DL (ref 2.7–4.5)
PLATELET # BLD AUTO: 84 K/UL (ref 150–450)
PLATELET BLD QL SMEAR: ABNORMAL
PMV BLD AUTO: ABNORMAL FL (ref 9.2–12.9)
POIKILOCYTOSIS BLD QL SMEAR: SLIGHT
POLYCHROMASIA BLD QL SMEAR: ABNORMAL
POTASSIUM SERPL-SCNC: 3 MMOL/L (ref 3.5–5.1)
PROT SERPL-MCNC: 5.5 G/DL (ref 6–8.4)
RBC # BLD AUTO: 3.21 M/UL (ref 4–5.4)
SODIUM SERPL-SCNC: 145 MMOL/L (ref 136–145)
WBC # BLD AUTO: 12.39 K/UL (ref 3.9–12.7)

## 2023-07-14 PROCEDURE — 94761 N-INVAS EAR/PLS OXIMETRY MLT: CPT

## 2023-07-14 PROCEDURE — 85025 COMPLETE CBC W/AUTO DIFF WBC: CPT | Performed by: STUDENT IN AN ORGANIZED HEALTH CARE EDUCATION/TRAINING PROGRAM

## 2023-07-14 PROCEDURE — 36415 COLL VENOUS BLD VENIPUNCTURE: CPT | Performed by: STUDENT IN AN ORGANIZED HEALTH CARE EDUCATION/TRAINING PROGRAM

## 2023-07-14 PROCEDURE — 63600175 PHARM REV CODE 636 W HCPCS

## 2023-07-14 PROCEDURE — 63600175 PHARM REV CODE 636 W HCPCS: Performed by: STUDENT IN AN ORGANIZED HEALTH CARE EDUCATION/TRAINING PROGRAM

## 2023-07-14 PROCEDURE — 97535 SELF CARE MNGMENT TRAINING: CPT

## 2023-07-14 PROCEDURE — 80053 COMPREHEN METABOLIC PANEL: CPT | Performed by: STUDENT IN AN ORGANIZED HEALTH CARE EDUCATION/TRAINING PROGRAM

## 2023-07-14 PROCEDURE — 36410 VNPNXR 3YR/> PHY/QHP DX/THER: CPT

## 2023-07-14 PROCEDURE — 84100 ASSAY OF PHOSPHORUS: CPT | Performed by: STUDENT IN AN ORGANIZED HEALTH CARE EDUCATION/TRAINING PROGRAM

## 2023-07-14 PROCEDURE — C1751 CATH, INF, PER/CENT/MIDLINE: HCPCS

## 2023-07-14 PROCEDURE — 92526 ORAL FUNCTION THERAPY: CPT

## 2023-07-14 PROCEDURE — 83735 ASSAY OF MAGNESIUM: CPT | Performed by: STUDENT IN AN ORGANIZED HEALTH CARE EDUCATION/TRAINING PROGRAM

## 2023-07-14 PROCEDURE — 25000003 PHARM REV CODE 250: Performed by: STUDENT IN AN ORGANIZED HEALTH CARE EDUCATION/TRAINING PROGRAM

## 2023-07-14 PROCEDURE — 11000001 HC ACUTE MED/SURG PRIVATE ROOM

## 2023-07-14 RX ORDER — DEXTROSE MONOHYDRATE, SODIUM CHLORIDE, AND POTASSIUM CHLORIDE 50; .745; 4.5 G/1000ML; G/1000ML; G/1000ML
INJECTION, SOLUTION INTRAVENOUS CONTINUOUS
Status: DISPENSED | OUTPATIENT
Start: 2023-07-14 | End: 2023-07-15

## 2023-07-14 RX ORDER — MORPHINE SULFATE 2 MG/ML
2 INJECTION, SOLUTION INTRAMUSCULAR; INTRAVENOUS EVERY 4 HOURS PRN
Status: DISPENSED | OUTPATIENT
Start: 2023-07-14 | End: 2023-07-17

## 2023-07-14 RX ORDER — POTASSIUM CHLORIDE 7.45 MG/ML
10 INJECTION INTRAVENOUS
Status: COMPLETED | OUTPATIENT
Start: 2023-07-14 | End: 2023-07-14

## 2023-07-14 RX ADMIN — DEXTROSE, SODIUM CHLORIDE, AND POTASSIUM CHLORIDE: 5; .45; .075 INJECTION INTRAVENOUS at 08:07

## 2023-07-14 RX ADMIN — MORPHINE SULFATE 2 MG: 2 INJECTION, SOLUTION INTRAMUSCULAR; INTRAVENOUS at 10:07

## 2023-07-14 RX ADMIN — POTASSIUM CHLORIDE 10 MEQ: 7.46 INJECTION, SOLUTION INTRAVENOUS at 08:07

## 2023-07-14 RX ADMIN — ENOXAPARIN SODIUM 50 MG: 60 INJECTION SUBCUTANEOUS at 04:07

## 2023-07-14 RX ADMIN — AZITHROMYCIN MONOHYDRATE 500 MG: 500 INJECTION, POWDER, LYOPHILIZED, FOR SOLUTION INTRAVENOUS at 12:07

## 2023-07-14 RX ADMIN — POTASSIUM CHLORIDE 10 MEQ: 7.46 INJECTION, SOLUTION INTRAVENOUS at 10:07

## 2023-07-14 RX ADMIN — CEFTRIAXONE SODIUM 2 G: 2 INJECTION, POWDER, FOR SOLUTION INTRAMUSCULAR; INTRAVENOUS at 11:07

## 2023-07-14 RX ADMIN — LIDOCAINE PATCH 5% 1 PATCH: 700 PATCH TOPICAL at 09:07

## 2023-07-14 RX ADMIN — POTASSIUM CHLORIDE 10 MEQ: 7.46 INJECTION, SOLUTION INTRAVENOUS at 09:07

## 2023-07-14 NOTE — NURSING
Pt disoriented and interfering w/ treatment of care. MD notified and non-violent mitten restraints applied. Will continue to monitor.

## 2023-07-14 NOTE — PROGRESS NOTES
RAPID RESPONSE NURSE PROACTIVE ROUNDING NOTE       Time of Visit: 1830    Called for PIV placement. Unsuccessful USG PIV attempt. Nurse notified. Recommend midline placement.
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
## 2023-07-14 NOTE — PLAN OF CARE
Problem: Adult Inpatient Plan of Care  Goal: Plan of Care Review  Outcome: Ongoing, Progressing       Alyse orientation, pt response by moaning, but is alert to voice. VS wnl on room air. Bed locked, in low position, and call light within reach. Will continue to monitor.

## 2023-07-14 NOTE — PT/OT/SLP PROGRESS
"Speech Language Pathology Treatment    Patient Name:  Nathan Simpson   MRN:  [id number]  [id number]    Admitting Diagnosis: Hypernatremia    Recommendations:                 General Recommendations:   follow up indicated  Diet recommendations:  NPO, Liquid Diet Level: NPO   Aspiration Precautions:   Frequent oral care,   Ice chips sparingly for pleasure,   and Strict aspiration precautions   General Precautions: Standard, NPO, fall, aspiration  Communication strategies:  none    Should patient/family/MD wish to continue an oral diet despite possible aspiration risk, this SLP feels the safest oral diet to be puree diet and thin liquids. Please note altered diet and precautions may reduce, but not eliminate aspiration risk. Patient remains at a risk to aspirate all po intake.  Aspiration Precautions: (strict)   1 SMALL bite/sip at a time at a slow rate (ensure patient swallows prior to giving another bite/sip-feel throat for swallow)  Assistance with meals and assistance with thickening liquids,    Check for pocketing/oral residue,    Eliminate distractions and feed only when awake/alert,   EXCELLENT AND FREQUENT ORAL CARE,  HOB to 90 degrees and remain upright 30 mintues s/p meals,  Meds crushed in puree,   Continue to monitor for signs and symptoms of aspiration and discontinue oral feeding should you notice any of the following: watery eyes, reddened facial area, wet vocal quality, increased work of breathing, change in respiratory status, increased congestion, coughing, fever, etc.      Assessment:     Nathan Simpson is a 77 y.o. female with an SLP diagnosis of xerostomia and s/sx of oral and pharyngeal dysphagia.    Subjective     Patient awake, however, confusion evident.   Patient with tangential speech intermittently throughout session. Patient's son at bedside.   Patient's son explains, "It wasn't super thick. Just a little thicker." -in reference to liquid consistency prior to current admission      Objective: "     Has the patient been evaluated by SLP for swallowing?   Yes  Keep patient NPO? Yes     Patient seen for an ongoing swallowing assessment. SLP provided oral care with toothbrush, toothpaste, oral swabs, mouthwash, and suctioning. Patient biting down on yankauer. Lips and gums noted to continue to bleed. Patient accepted teaspoon sip of nectar thick water and 1/4 teaspoon bites and pudding x2. Patient with no pharngeal swallow of nectar thick water or pudding trial. Anterior loss of nectar thick water appreciated. Suctioning provided after all trials. Patient initiated a swallow s/p second small bite of pudding given max cues and dry spoon depressing the tongue in attempts to facilitate a swallow. Immediate weak coughing/choking appreciated. Wet vocal quality noted. No further trials given 2/2 high aspiration risk. SLP provided family (son) education on SLP recommendations, SLP role, s/s and risks of aspiration, and POC. All questions addressed within SLP scope and family member in agreement with current POC. Patient's son reports his sister is coming home from a cruise tomorrow and they will make  decisions on possible alternative means of nutrition/hydration/medication vs. continuing to eat/drink with high aspiration risk. SLP provided further education on 'safest' diet recommendations with continued high aspiration risks. ST will continue to follow.     Goals:   Multidisciplinary Problems       SLP Goals       Not on file                    Plan:     Patient to be seen:  3 x/week   Plan of Care expires:  08/09/23  Plan of Care reviewed with:  patient, son   SLP Follow-Up:  Yes       Discharge recommendations:   (tbd)       Time Tracking:     SLP Treatment Date:   07/14/23  Speech Start Time:  0808  Speech Stop Time:  0829     Speech Total Time (min):  21 min    Billable Minutes: Treatment Swallowing Dysfunction 10  Education 11    07/14/2023

## 2023-07-15 LAB
ALBUMIN SERPL BCP-MCNC: 1.8 G/DL (ref 3.5–5.2)
ALP SERPL-CCNC: 168 U/L (ref 55–135)
ALT SERPL W/O P-5'-P-CCNC: 87 U/L (ref 10–44)
ANION GAP SERPL CALC-SCNC: 9 MMOL/L (ref 8–16)
AST SERPL-CCNC: 205 U/L (ref 10–40)
BACTERIA BLD CULT: NORMAL
BASOPHILS # BLD AUTO: 0.04 K/UL (ref 0–0.2)
BASOPHILS NFR BLD: 0.3 % (ref 0–1.9)
BILIRUB SERPL-MCNC: 0.2 MG/DL (ref 0.1–1)
BUN SERPL-MCNC: 20 MG/DL (ref 8–23)
CALCIUM SERPL-MCNC: 7.5 MG/DL (ref 8.7–10.5)
CHLORIDE SERPL-SCNC: 119 MMOL/L (ref 95–110)
CO2 SERPL-SCNC: 12 MMOL/L (ref 23–29)
CREAT SERPL-MCNC: 0.9 MG/DL (ref 0.5–1.4)
DIFFERENTIAL METHOD: ABNORMAL
EOSINOPHIL # BLD AUTO: 0.3 K/UL (ref 0–0.5)
EOSINOPHIL NFR BLD: 2.3 % (ref 0–8)
ERYTHROCYTE [DISTWIDTH] IN BLOOD BY AUTOMATED COUNT: 19.3 % (ref 11.5–14.5)
EST. GFR  (NO RACE VARIABLE): >60 ML/MIN/1.73 M^2
GLUCOSE SERPL-MCNC: 83 MG/DL (ref 70–110)
HCT VFR BLD AUTO: 25.9 % (ref 37–48.5)
HGB BLD-MCNC: 7.9 G/DL (ref 12–16)
IMM GRANULOCYTES # BLD AUTO: 0.57 K/UL (ref 0–0.04)
IMM GRANULOCYTES NFR BLD AUTO: 4.7 % (ref 0–0.5)
LYMPHOCYTES # BLD AUTO: 1.9 K/UL (ref 1–4.8)
LYMPHOCYTES NFR BLD: 16 % (ref 18–48)
MAGNESIUM SERPL-MCNC: 2.7 MG/DL (ref 1.6–2.6)
MCH RBC QN AUTO: 24.1 PG (ref 27–31)
MCHC RBC AUTO-ENTMCNC: 30.5 G/DL (ref 32–36)
MCV RBC AUTO: 79 FL (ref 82–98)
MONOCYTES # BLD AUTO: 0.6 K/UL (ref 0.3–1)
MONOCYTES NFR BLD: 5.3 % (ref 4–15)
MRSA ID BY PCR: NEGATIVE
NEUTROPHILS # BLD AUTO: 8.6 K/UL (ref 1.8–7.7)
NEUTROPHILS NFR BLD: 71.4 % (ref 38–73)
NRBC BLD-RTO: 1 /100 WBC
PHOSPHATE SERPL-MCNC: 3 MG/DL (ref 2.7–4.5)
PLATELET # BLD AUTO: 106 K/UL (ref 150–450)
PMV BLD AUTO: ABNORMAL FL (ref 9.2–12.9)
POTASSIUM SERPL-SCNC: 4.9 MMOL/L (ref 3.5–5.1)
PROT SERPL-MCNC: 5.9 G/DL (ref 6–8.4)
RBC # BLD AUTO: 3.28 M/UL (ref 4–5.4)
SODIUM SERPL-SCNC: 140 MMOL/L (ref 136–145)
STAPH AUREUS ID BY PCR: NEGATIVE
WBC # BLD AUTO: 12.01 K/UL (ref 3.9–12.7)

## 2023-07-15 PROCEDURE — 85025 COMPLETE CBC W/AUTO DIFF WBC: CPT | Performed by: STUDENT IN AN ORGANIZED HEALTH CARE EDUCATION/TRAINING PROGRAM

## 2023-07-15 PROCEDURE — 80053 COMPREHEN METABOLIC PANEL: CPT | Performed by: STUDENT IN AN ORGANIZED HEALTH CARE EDUCATION/TRAINING PROGRAM

## 2023-07-15 PROCEDURE — 84100 ASSAY OF PHOSPHORUS: CPT | Performed by: STUDENT IN AN ORGANIZED HEALTH CARE EDUCATION/TRAINING PROGRAM

## 2023-07-15 PROCEDURE — 99900035 HC TECH TIME PER 15 MIN (STAT)

## 2023-07-15 PROCEDURE — 63600175 PHARM REV CODE 636 W HCPCS: Performed by: STUDENT IN AN ORGANIZED HEALTH CARE EDUCATION/TRAINING PROGRAM

## 2023-07-15 PROCEDURE — 36415 COLL VENOUS BLD VENIPUNCTURE: CPT | Performed by: STUDENT IN AN ORGANIZED HEALTH CARE EDUCATION/TRAINING PROGRAM

## 2023-07-15 PROCEDURE — 25000003 PHARM REV CODE 250: Performed by: STUDENT IN AN ORGANIZED HEALTH CARE EDUCATION/TRAINING PROGRAM

## 2023-07-15 PROCEDURE — 11000001 HC ACUTE MED/SURG PRIVATE ROOM

## 2023-07-15 PROCEDURE — 83735 ASSAY OF MAGNESIUM: CPT | Performed by: STUDENT IN AN ORGANIZED HEALTH CARE EDUCATION/TRAINING PROGRAM

## 2023-07-15 PROCEDURE — 63600175 PHARM REV CODE 636 W HCPCS

## 2023-07-15 PROCEDURE — 94761 N-INVAS EAR/PLS OXIMETRY MLT: CPT

## 2023-07-15 RX ORDER — DEXTROSE MONOHYDRATE, SODIUM CHLORIDE, AND POTASSIUM CHLORIDE 50; .745; 4.5 G/1000ML; G/1000ML; G/1000ML
INJECTION, SOLUTION INTRAVENOUS CONTINUOUS
Status: DISPENSED | OUTPATIENT
Start: 2023-07-15 | End: 2023-07-16

## 2023-07-15 RX ADMIN — ENOXAPARIN SODIUM 50 MG: 60 INJECTION SUBCUTANEOUS at 04:07

## 2023-07-15 RX ADMIN — LIDOCAINE PATCH 5% 1 PATCH: 700 PATCH TOPICAL at 08:07

## 2023-07-15 RX ADMIN — DEXTROSE, SODIUM CHLORIDE, AND POTASSIUM CHLORIDE: 5; .45; .075 INJECTION INTRAVENOUS at 11:07

## 2023-07-15 RX ADMIN — CEFTRIAXONE SODIUM 2 G: 2 INJECTION, POWDER, FOR SOLUTION INTRAMUSCULAR; INTRAVENOUS at 11:07

## 2023-07-15 RX ADMIN — DEXTROSE, SODIUM CHLORIDE, AND POTASSIUM CHLORIDE: 5; .45; .075 INJECTION INTRAVENOUS at 04:07
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
## 2023-07-15 NOTE — PLAN OF CARE
Problem: Adult Inpatient Plan of Care  Goal: Plan of Care Review  Outcome: Ongoing, Progressing     Problem: Fall Injury Risk  Goal: Absence of Fall and Fall-Related Injury  Outcome: Ongoing, Progressing     Problem: Bleeding (Sepsis/Septic Shock)  Goal: Absence of Bleeding  Outcome: Ongoing, Progressing     Problem: Infection Progression (Sepsis/Septic Shock)  Goal: Absence of Infection Signs and Symptoms  Outcome: Ongoing, Progressing

## 2023-07-16 LAB
ALBUMIN SERPL BCP-MCNC: 1.7 G/DL (ref 3.5–5.2)
ALP SERPL-CCNC: 156 U/L (ref 55–135)
ALT SERPL W/O P-5'-P-CCNC: 143 U/L (ref 10–44)
ANION GAP SERPL CALC-SCNC: 6 MMOL/L (ref 8–16)
ANISOCYTOSIS BLD QL SMEAR: SLIGHT
AST SERPL-CCNC: 441 U/L (ref 10–40)
BASOPHILS # BLD AUTO: ABNORMAL K/UL (ref 0–0.2)
BASOPHILS NFR BLD: 0 % (ref 0–1.9)
BILIRUB SERPL-MCNC: 0.2 MG/DL (ref 0.1–1)
BUN SERPL-MCNC: 10 MG/DL (ref 8–23)
BURR CELLS BLD QL SMEAR: ABNORMAL
CALCIUM SERPL-MCNC: 7.5 MG/DL (ref 8.7–10.5)
CHLORIDE SERPL-SCNC: 118 MMOL/L (ref 95–110)
CO2 SERPL-SCNC: 19 MMOL/L (ref 23–29)
CREAT SERPL-MCNC: 0.7 MG/DL (ref 0.5–1.4)
DIFFERENTIAL METHOD: ABNORMAL
EOSINOPHIL # BLD AUTO: ABNORMAL K/UL (ref 0–0.5)
EOSINOPHIL NFR BLD: 2 % (ref 0–8)
ERYTHROCYTE [DISTWIDTH] IN BLOOD BY AUTOMATED COUNT: 19.6 % (ref 11.5–14.5)
EST. GFR  (NO RACE VARIABLE): >60 ML/MIN/1.73 M^2
GLUCOSE SERPL-MCNC: 91 MG/DL (ref 70–110)
HCT VFR BLD AUTO: 25 % (ref 37–48.5)
HGB BLD-MCNC: 7.6 G/DL (ref 12–16)
HYPOCHROMIA BLD QL SMEAR: ABNORMAL
IMM GRANULOCYTES # BLD AUTO: ABNORMAL K/UL (ref 0–0.04)
IMM GRANULOCYTES NFR BLD AUTO: ABNORMAL % (ref 0–0.5)
LYMPHOCYTES # BLD AUTO: ABNORMAL K/UL (ref 1–4.8)
LYMPHOCYTES NFR BLD: 13 % (ref 18–48)
MAGNESIUM SERPL-MCNC: 2.3 MG/DL (ref 1.6–2.6)
MCH RBC QN AUTO: 24 PG (ref 27–31)
MCHC RBC AUTO-ENTMCNC: 30.4 G/DL (ref 32–36)
MCV RBC AUTO: 79 FL (ref 82–98)
MONOCYTES # BLD AUTO: ABNORMAL K/UL (ref 0.3–1)
MONOCYTES NFR BLD: 1 % (ref 4–15)
NEUTROPHILS NFR BLD: 84 % (ref 38–73)
NRBC BLD-RTO: 1 /100 WBC
OVALOCYTES BLD QL SMEAR: ABNORMAL
PHOSPHATE SERPL-MCNC: 2.4 MG/DL (ref 2.7–4.5)
PLATELET # BLD AUTO: 131 K/UL (ref 150–450)
PLATELET BLD QL SMEAR: ABNORMAL
PMV BLD AUTO: ABNORMAL FL (ref 9.2–12.9)
POIKILOCYTOSIS BLD QL SMEAR: SLIGHT
POTASSIUM SERPL-SCNC: 3.3 MMOL/L (ref 3.5–5.1)
PROT SERPL-MCNC: 5.3 G/DL (ref 6–8.4)
RBC # BLD AUTO: 3.17 M/UL (ref 4–5.4)
SODIUM SERPL-SCNC: 143 MMOL/L (ref 136–145)
WBC # BLD AUTO: 8.53 K/UL (ref 3.9–12.7)

## 2023-07-16 PROCEDURE — 36415 COLL VENOUS BLD VENIPUNCTURE: CPT | Performed by: STUDENT IN AN ORGANIZED HEALTH CARE EDUCATION/TRAINING PROGRAM

## 2023-07-16 PROCEDURE — 80053 COMPREHEN METABOLIC PANEL: CPT

## 2023-07-16 PROCEDURE — 94761 N-INVAS EAR/PLS OXIMETRY MLT: CPT

## 2023-07-16 PROCEDURE — 36415 COLL VENOUS BLD VENIPUNCTURE: CPT

## 2023-07-16 PROCEDURE — 97535 SELF CARE MNGMENT TRAINING: CPT

## 2023-07-16 PROCEDURE — 83735 ASSAY OF MAGNESIUM: CPT | Performed by: STUDENT IN AN ORGANIZED HEALTH CARE EDUCATION/TRAINING PROGRAM

## 2023-07-16 PROCEDURE — 85007 BL SMEAR W/DIFF WBC COUNT: CPT | Performed by: STUDENT IN AN ORGANIZED HEALTH CARE EDUCATION/TRAINING PROGRAM

## 2023-07-16 PROCEDURE — 92526 ORAL FUNCTION THERAPY: CPT

## 2023-07-16 PROCEDURE — 27000221 HC OXYGEN, UP TO 24 HOURS

## 2023-07-16 PROCEDURE — 63600175 PHARM REV CODE 636 W HCPCS

## 2023-07-16 PROCEDURE — 11000001 HC ACUTE MED/SURG PRIVATE ROOM

## 2023-07-16 PROCEDURE — 84100 ASSAY OF PHOSPHORUS: CPT | Performed by: STUDENT IN AN ORGANIZED HEALTH CARE EDUCATION/TRAINING PROGRAM

## 2023-07-16 PROCEDURE — 85027 COMPLETE CBC AUTOMATED: CPT | Performed by: STUDENT IN AN ORGANIZED HEALTH CARE EDUCATION/TRAINING PROGRAM

## 2023-07-16 PROCEDURE — 25000003 PHARM REV CODE 250: Performed by: STUDENT IN AN ORGANIZED HEALTH CARE EDUCATION/TRAINING PROGRAM

## 2023-07-16 RX ORDER — POTASSIUM CHLORIDE 7.45 MG/ML
10 INJECTION INTRAVENOUS
Status: DISPENSED | OUTPATIENT
Start: 2023-07-16 | End: 2023-07-16

## 2023-07-16 RX ORDER — MORPHINE SULFATE 2 MG/ML
2 INJECTION, SOLUTION INTRAMUSCULAR; INTRAVENOUS ONCE
Status: DISCONTINUED | OUTPATIENT
Start: 2023-07-16 | End: 2023-07-19

## 2023-07-16 RX ADMIN — POTASSIUM CHLORIDE 10 MEQ: 7.45 INJECTION INTRAVENOUS at 09:07

## 2023-07-16 RX ADMIN — ENOXAPARIN SODIUM 50 MG: 60 INJECTION SUBCUTANEOUS at 03:07

## 2023-07-16 RX ADMIN — ENOXAPARIN SODIUM 50 MG: 60 INJECTION SUBCUTANEOUS at 05:07

## 2023-07-16 RX ADMIN — LIDOCAINE PATCH 5% 1 PATCH: 700 PATCH TOPICAL at 07:07
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
## 2023-07-16 NOTE — PT/OT/SLP PROGRESS
Speech Language Pathology   Dysphagia Treatment  Ongoing Family/Patient Education      Patient Name:  Nathan Simpson   MRN:  [id number]  Admitting Diagnosis: Hypernatremia    Recommendations:                 General Recommendations:  SLP will continue to follow per Palliative request, provide family Educ, ? GI consult pending medical team POC   Diet recommendations:  NPO, Liquid Diet Level: NPO - pleasure feeds per family request (ice chips, pureed bites, water with daily and frequent oral care)  Aspiration Precautions:   Frequent oral care,   Ice chips, free water sparingly for pleasure feed  and Strict aspiration precautions   General Precautions: Standard, NPO, fall, aspiration  Communication strategies:  none     Should patient/family/MD wish to continue an oral diet despite possible aspiration risk, this SLP feels the safest oral diet to be puree diet and thin liquids. Please note altered diet and precautions may reduce, but not eliminate aspiration risk. Patient remains at a risk to aspirate all po intake.  Aspiration Precautions: (strict)   1 SMALL bite/sip at a time at a slow rate (ensure patient swallows prior to giving another bite/sip-feel throat for swallow)  Assistance with meals and assistance with thickening liquids,    Check for pocketing/oral residue,    Eliminate distractions and feed only when awake/alert,   EXCELLENT AND FREQUENT ORAL CARE,  HOB to 90 degrees and remain upright 30 mintues s/p meals,  Meds crushed in puree,   Continue to monitor for signs and symptoms of aspiration and discontinue oral feeding should you notice any of the following: watery eyes, reddened facial area, wet vocal quality, increased work of breathing, change in respiratory status, increased congestion, coughing, fever, etc.  Assessment:     Nathan Simpson is a 77 y.o. female admitted with Hypernatremia, UTI who presents with underlying hx of CA with an SLP diagnosis of persistent Dysphagia and Dysarthria from subacute CVA.  "    Subjective     Pt seen this date in room, family present including primary Fidelina WU. Grandkids present and all of patient's adult children present.   Patient goals: "to be comfortable."    Pain/Comfort:  Pain Rating 1:  (did not state to SLP)    Respiratory Status: NC    Objective:     Has the patient been evaluated by SLP for swallowing?   Yes  Keep patient NPO? Yes       Swallowing:  Pt seen in room, pt remains in bilateral hand mitts, no use of L hand is noted, pt swatting R hand around at SLP and dtrFidelina.  SLP discussed her role, NPO vs pleasure feed diet and what if peg tube was placed. Family is open that they are not ready to give up on their mother and feel like she is still safe to eat and would like for  her to continue to live at home with family support and assistance of sitter if provided.   Fidelina voiced that private sitter services have long wait lists at this time.   SLP openly discussed discussion with primary MD team and Palliative MD about home hospice options which could provide family support and allow family to visit pt and provide their mother with more QOL at this stage of life.   They would like  to meet with primary MD team and Palliative MD on Monday 7/17- to discuss further options in regards to feeding etc.     During direct PO trials- pt takes minimal bites of applesauce, swatting it away and flinging spoon to wall where applesauce was spilled onto dtr and the wall. Pt continues to sound gurgly during presentations of THIN (water) and pureed trials due to suspected pharyngeal residuals in pharynx as pt does  not adequately clear from throat and inconsistently follows safe SWALLOW strategies to clear residuals due to suspected reduced cognition and sensation of oral and pharyngeal residuals.   Family would like to continue to feed pt and are aware that pt needs frequent and aggressive oral care before and after PO trials. Pt continues with delayed oral and pharyngeal swallow per " neck palpation.     Family/Patient Education: Discussed at length role of SLP with Fidelina, risk of airway aspiration with oral means and a feeding tube. Discussed use of oral care before and after PO trials and discussed how pt will be at risk of failure to thrive, continued risk of malnutrition and dehydration with limited oral intake. All parties demon'd understanding and did inquire about who they could speak with regarding possible contemplation of feeding tube.  Lengthy secure chat sent to all treating team members s/p session. SLP did speak with Dr. LATONYA Aguirre regarding above SLP POC.       Goals:   Multidisciplinary Problems       SLP Goals          Problem: SLP    Goal Priority Disciplines Outcome   SLP Goal     SLP Ongoing, Not Progressing   Description: Updated ST. Pt will participate in swallow eval to determine safest diet level- ongoing still NPO per SLP recs  2. Pt and family will be educated on pleasure feeds, oral care, safest diet with minimal restrictions and importance of oral nutrition vs non oral means.   3. Palliative and GI to be consulted to delineate GOC in regards to long term plans and what a feeding tube vs oral diet would entail and aspiration risk for the patient.                        Plan:     Patient to be seen:  3 x/week   Plan of Care expires:  23  Plan of Care reviewed with:  patient, daughter, family, grandchild(david)   SLP Follow-Up:  Yes       Discharge recommendations:   (home, needs 24 hour care)   Barriers to Discharge:  GOC    Time Tracking:     SLP Treatment Date:   23  Speech Start Time:  851  Speech Stop Time:  933     Speech Total Time (min):  42 min    Billable Minutes: Treatment Swallowing Dysfunction 15 and Self Care/Home Management Training 27    2023

## 2023-07-16 NOTE — NURSING
Pt family member informed bedside nurse blood leaking on sheets. Upon assessment, midline catheter inadvertently removed. Pressure applied to site with 4x4 gauze to control bleeding and reinforced with lexus. Informed Dr. Aguirre IV access lost. Unable to administer potassium chloride replacements and pain medication. MD states will place order for one time dose of morphine IM to address pain and will re attempt for IV access. NADN. Continue POC.

## 2023-07-16 NOTE — PLAN OF CARE
Problem: SLP  Goal: SLP Goal  Description: Updated ST. Pt will participate in swallow eval to determine safest diet level- ongoing still NPO per SLP recs  2. Pt and family will be educated on pleasure feeds, oral care, safest diet with minimal restrictions and importance of oral nutrition vs non oral means.   3. Palliative and GI to be consulted to delineate GOC in regards to long term plans and what a feeding tube vs oral diet would entail and aspiration risk for the patient.   Outcome: Ongoing, Not Progressing   Long session with dtrs and son at bedside, SLP established rapport with all parties, discussed aspiration risk and what non oral means of feeding vs pureed tray with strict precautions on thin liquids could mean for patient's overall nutrition/hydration and medication means. Discussed how QOL, NPO and what the potential of a feeding tube could mean for patient. Dtrs are interested in proving mother continued oral nutrition but would like more information on home hospice, PEG tube feedings and what the procedure would entail. Secure chat sent to attending MD, primary MD team and LPN this date.

## 2023-07-16 NOTE — PLAN OF CARE
Problem: Adult Inpatient Plan of Care  Goal: Plan of Care Review  Outcome: Ongoing, Progressing     Problem: Fall Injury Risk  Goal: Absence of Fall and Fall-Related Injury  Outcome: Ongoing, Progressing     Problem: Bleeding (Sepsis/Septic Shock)  Goal: Absence of Bleeding  Outcome: Ongoing, Progressing     Problem: Cognitive Impairment (Stroke, Ischemic/Transient Ischemic Attack)  Goal: Optimal Cognitive Function  Outcome: Ongoing, Progressing

## 2023-07-16 NOTE — NURSING
Upon administering Morphine IM pt stated she wasn't in any pain. Morphine held. Dr. Tipton team notified that pt was not receiving ordered abx and/or other IV medications ordered due to loss of IV and requested advisement from MD. No new orders given. NADN. Continue POC.

## 2023-07-17 PROBLEM — B96.1 BACTEREMIA DUE TO KLEBSIELLA PNEUMONIAE: Status: ACTIVE | Noted: 2023-07-17

## 2023-07-17 PROBLEM — R62.7 FAILURE TO THRIVE IN ADULT: Status: ACTIVE | Noted: 2023-07-17

## 2023-07-17 PROBLEM — R78.81 BACTEREMIA DUE TO KLEBSIELLA PNEUMONIAE: Status: ACTIVE | Noted: 2023-07-17

## 2023-07-17 PROBLEM — E16.2 HYPOGLYCEMIA: Status: ACTIVE | Noted: 2023-07-17

## 2023-07-17 LAB
ALBUMIN SERPL BCP-MCNC: 1.8 G/DL (ref 3.5–5.2)
ALP SERPL-CCNC: 186 U/L (ref 55–135)
ALT SERPL W/O P-5'-P-CCNC: 172 U/L (ref 10–44)
ANION GAP SERPL CALC-SCNC: 7 MMOL/L (ref 8–16)
ANISOCYTOSIS BLD QL SMEAR: SLIGHT
AST SERPL-CCNC: 462 U/L (ref 10–40)
BACTERIA BLD CULT: ABNORMAL
BASOPHILS NFR BLD: 0 % (ref 0–1.9)
BILIRUB SERPL-MCNC: 0.3 MG/DL (ref 0.1–1)
BUN SERPL-MCNC: 9 MG/DL (ref 8–23)
BURR CELLS BLD QL SMEAR: ABNORMAL
CALCIUM SERPL-MCNC: 8 MG/DL (ref 8.7–10.5)
CHLORIDE SERPL-SCNC: 115 MMOL/L (ref 95–110)
CK SERPL-CCNC: 640 U/L (ref 20–180)
CO2 SERPL-SCNC: 22 MMOL/L (ref 23–29)
CREAT SERPL-MCNC: 0.7 MG/DL (ref 0.5–1.4)
DACRYOCYTES BLD QL SMEAR: ABNORMAL
DIFFERENTIAL METHOD: ABNORMAL
EOSINOPHIL NFR BLD: 5 % (ref 0–8)
ERYTHROCYTE [DISTWIDTH] IN BLOOD BY AUTOMATED COUNT: 19.9 % (ref 11.5–14.5)
EST. GFR  (NO RACE VARIABLE): >60 ML/MIN/1.73 M^2
GLUCOSE SERPL-MCNC: 60 MG/DL (ref 70–110)
GLUCOSE SERPL-MCNC: 61 MG/DL (ref 70–110)
HCT VFR BLD AUTO: 26 % (ref 37–48.5)
HGB BLD-MCNC: 7.7 G/DL (ref 12–16)
HYPOCHROMIA BLD QL SMEAR: ABNORMAL
IMM GRANULOCYTES # BLD AUTO: ABNORMAL K/UL (ref 0–0.04)
IMM GRANULOCYTES NFR BLD AUTO: ABNORMAL % (ref 0–0.5)
LYMPHOCYTES NFR BLD: 26 % (ref 18–48)
MAGNESIUM SERPL-MCNC: 2.3 MG/DL (ref 1.6–2.6)
MCH RBC QN AUTO: 23.9 PG (ref 27–31)
MCHC RBC AUTO-ENTMCNC: 29.6 G/DL (ref 32–36)
MCV RBC AUTO: 81 FL (ref 82–98)
METAMYELOCYTES NFR BLD MANUAL: 2 %
MONOCYTES NFR BLD: 3 % (ref 4–15)
NEUTROPHILS NFR BLD: 60 % (ref 38–73)
NEUTS BAND NFR BLD MANUAL: 4 %
NRBC BLD-RTO: 1 /100 WBC
PHOSPHATE SERPL-MCNC: 2.6 MG/DL (ref 2.7–4.5)
PLATELET # BLD AUTO: 147 K/UL (ref 150–450)
PLATELET BLD QL SMEAR: ABNORMAL
PMV BLD AUTO: 10.8 FL (ref 9.2–12.9)
POCT GLUCOSE: 49 MG/DL (ref 70–110)
POCT GLUCOSE: 53 MG/DL (ref 70–110)
POCT GLUCOSE: 70 MG/DL (ref 70–110)
POLYCHROMASIA BLD QL SMEAR: ABNORMAL
POTASSIUM SERPL-SCNC: 3.6 MMOL/L (ref 3.5–5.1)
PROT SERPL-MCNC: 5.6 G/DL (ref 6–8.4)
RBC # BLD AUTO: 3.22 M/UL (ref 4–5.4)
SCHISTOCYTES BLD QL SMEAR: ABNORMAL
SMUDGE CELLS BLD QL SMEAR: PRESENT
SODIUM SERPL-SCNC: 144 MMOL/L (ref 136–145)
TOXIC GRANULES BLD QL SMEAR: PRESENT
WBC # BLD AUTO: 8.85 K/UL (ref 3.9–12.7)

## 2023-07-17 PROCEDURE — 63600175 PHARM REV CODE 636 W HCPCS

## 2023-07-17 PROCEDURE — C1751 CATH, INF, PER/CENT/MIDLINE: HCPCS

## 2023-07-17 PROCEDURE — 11000001 HC ACUTE MED/SURG PRIVATE ROOM

## 2023-07-17 PROCEDURE — 36415 COLL VENOUS BLD VENIPUNCTURE: CPT

## 2023-07-17 PROCEDURE — 25000003 PHARM REV CODE 250

## 2023-07-17 PROCEDURE — 99900035 HC TECH TIME PER 15 MIN (STAT)

## 2023-07-17 PROCEDURE — 82550 ASSAY OF CK (CPK): CPT

## 2023-07-17 PROCEDURE — 84100 ASSAY OF PHOSPHORUS: CPT | Performed by: STUDENT IN AN ORGANIZED HEALTH CARE EDUCATION/TRAINING PROGRAM

## 2023-07-17 PROCEDURE — 82947 ASSAY GLUCOSE BLOOD QUANT: CPT | Performed by: INTERNAL MEDICINE

## 2023-07-17 PROCEDURE — S5010 5% DEXTROSE AND 0.45% SALINE: HCPCS

## 2023-07-17 PROCEDURE — 94761 N-INVAS EAR/PLS OXIMETRY MLT: CPT

## 2023-07-17 PROCEDURE — 63600175 PHARM REV CODE 636 W HCPCS: Mod: JZ,JG | Performed by: STUDENT IN AN ORGANIZED HEALTH CARE EDUCATION/TRAINING PROGRAM

## 2023-07-17 PROCEDURE — 27000221 HC OXYGEN, UP TO 24 HOURS

## 2023-07-17 PROCEDURE — 99223 PR INITIAL HOSPITAL CARE,LEVL III: ICD-10-PCS | Mod: GC,,, | Performed by: INTERNAL MEDICINE

## 2023-07-17 PROCEDURE — 80053 COMPREHEN METABOLIC PANEL: CPT

## 2023-07-17 PROCEDURE — 36410 VNPNXR 3YR/> PHY/QHP DX/THER: CPT

## 2023-07-17 PROCEDURE — 99233 PR SUBSEQUENT HOSPITAL CARE,LEVL III: ICD-10-PCS | Mod: ,,, | Performed by: STUDENT IN AN ORGANIZED HEALTH CARE EDUCATION/TRAINING PROGRAM

## 2023-07-17 PROCEDURE — 25000003 PHARM REV CODE 250: Performed by: STUDENT IN AN ORGANIZED HEALTH CARE EDUCATION/TRAINING PROGRAM

## 2023-07-17 PROCEDURE — 85007 BL SMEAR W/DIFF WBC COUNT: CPT | Performed by: STUDENT IN AN ORGANIZED HEALTH CARE EDUCATION/TRAINING PROGRAM

## 2023-07-17 PROCEDURE — 99223 1ST HOSP IP/OBS HIGH 75: CPT | Mod: GC,,, | Performed by: INTERNAL MEDICINE

## 2023-07-17 PROCEDURE — 99233 SBSQ HOSP IP/OBS HIGH 50: CPT | Mod: ,,, | Performed by: STUDENT IN AN ORGANIZED HEALTH CARE EDUCATION/TRAINING PROGRAM

## 2023-07-17 PROCEDURE — 83735 ASSAY OF MAGNESIUM: CPT | Performed by: STUDENT IN AN ORGANIZED HEALTH CARE EDUCATION/TRAINING PROGRAM

## 2023-07-17 PROCEDURE — 85027 COMPLETE CBC AUTOMATED: CPT | Performed by: STUDENT IN AN ORGANIZED HEALTH CARE EDUCATION/TRAINING PROGRAM

## 2023-07-17 PROCEDURE — 92526 ORAL FUNCTION THERAPY: CPT

## 2023-07-17 RX ORDER — DEXTROSE MONOHYDRATE AND SODIUM CHLORIDE 5; .45 G/100ML; G/100ML
INJECTION, SOLUTION INTRAVENOUS CONTINUOUS
Status: DISCONTINUED | OUTPATIENT
Start: 2023-07-17 | End: 2023-07-18

## 2023-07-17 RX ADMIN — LIDOCAINE PATCH 5% 1 PATCH: 700 PATCH TOPICAL at 09:07

## 2023-07-17 RX ADMIN — ENOXAPARIN SODIUM 50 MG: 60 INJECTION SUBCUTANEOUS at 04:07

## 2023-07-17 RX ADMIN — CEFTRIAXONE SODIUM 2 G: 2 INJECTION, POWDER, FOR SOLUTION INTRAMUSCULAR; INTRAVENOUS at 03:07

## 2023-07-17 RX ADMIN — GLUCAGON 1 MG: 1 INJECTION, POWDER, LYOPHILIZED, FOR SOLUTION INTRAMUSCULAR; INTRAVENOUS at 10:07

## 2023-07-17 RX ADMIN — DEXTROSE AND SODIUM CHLORIDE: 5; .45 INJECTION, SOLUTION INTRAVENOUS at 03:07
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
## 2023-07-17 NOTE — PLAN OF CARE
Problem: SLP  Goal: SLP Goal  Description: Updated ST. Pt will participate in swallow eval to determine safest diet level- ongoing still NPO per SLP recs  2. Pt and family will be educated on pleasure feeds, oral care, safest diet with minimal restrictions and importance of oral nutrition vs non oral means.   3. Palliative and GI to be consulted to delineate GOC in regards to long term plans and what a feeding tube vs oral diet would entail and aspiration risk for the patient.   Outcome: Ongoing, Progressing
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
## 2023-07-17 NOTE — PLAN OF CARE
"   07/17/23 1119   Post-Acute Status   Post-Acute Authorization Home Health;Hospice   Home Health Status Pending medical clearance/testing   Hospice Status Pending medical clearance/testing   Discharge Plan   Discharge Plan A Home;Home with family   Discharge Plan B Home;Home with family;Hospice/home       VM left with Fidelina. Will need full family conversation about hospice. NO hospice referral recommended just yet. Will need full family decision. Family discussion to be had today.    Future Appointments   Date Time Provider Department Center   7/24/2023  1:00 PM Garry Mcqueen MD [facility] HEM ONC [facility]   8/9/2023  1:40 PM Garry Mcqueen MD [facility] HEM ONC [facility]     BP (!) 122/57   Pulse 69   Temp 98 °F (36.7 °C) (Axillary)   Resp 18   Ht 5' 11" (1.803 m)   Wt 49.5 kg (109 lb 2 oz)   SpO2 100%   BMI 15.22 kg/m²      acetaminophen  650 mg Rectal Once    cefTRIAXone (ROCEPHIN) IVPB  2 g Intravenous Q24H    enoxparin  1 mg/kg Subcutaneous Q12H (treatment, non-standard time)    LIDOcaine  1 patch Transdermal Q24H    morphine  2 mg Intramuscular Once     Consults (From admission, onward)          Status Ordering Provider     Inpatient consult to Midline team  Once        Provider:  (Not yet assigned)    Completed ARSENIO HAWTHORNE     Inpatient consult to Palliative Care  Once        Provider:  Bill Lawson Jr., MD    Completed ARSENIO HAWTHORNE     Inpatient consult to Hematology/Oncology  Once        Provider:  Garry Mcqueen MD    Completed ARSENIO HAWTHORNE     Inpatient consult to [facility]  Once        Provider:  Yuriy Garcia MD    Completed ARSENIO HAWTHORNE     IP consult to case management  Once        Provider:  (Not yet assigned)    Completed BABATUNDE CHAN.            "
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
## 2023-07-17 NOTE — ASSESSMENT & PLAN NOTE
- has had minimal PO intake leading to presentation with little improvement during admission  - SLP continuing to recommend NPO  - Palliative/Family meeting held today and would like to pursue PEG tube placement   - continue to hold AC  - will plan for PEG placement tomorrow
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
## 2023-07-17 NOTE — PT/OT/SLP PROGRESS
Speech Language Pathology Treatment    Patient Name:  Nathan Simpson   MRN:  [id number]  Admitting Diagnosis: Hypernatremia    Recommendations:                 General Recommendations:  Dysphagia therapy  Diet recommendations:  NPO, Pleasure Feeding, Liquid Diet Level: NPO   Aspiration Precautions: Frequent oral care, HOB to 90 degrees, Ice chips sparingly, and Puree for pleasure   General Precautions: Standard, aspiration, NPO, fall (dysarthric speech)  Communication strategies:  yes/no questions only and provide increased time to answer    Assessment:     Nathan Simpson is a 77 y.o. female with a dx of Hypernatremia, as well as hx of CVA who presents with cont mod oropharyngeal dysphagia, which is negatively impacted by pt's cognitive deficits. ST recs cont NPO, with puree and thin liquids for pleasure until GOC meeting to discuss dtr's wishes for pt regarding nutrition.     Subjective     Pt was asleep, lying in bed upon ST's entrance. Pt's dtr at b/s who stated GOC meeting for today to discuss nutrition recs. Pt's dtr stating she provided pt with apple sauce and apple juice yesterday w/ good acceptance.     Patient goals: Per dtr, cont po intake      Pain/Comfort:  Pain Rating 1: 0/10    Respiratory Status: Nasal cannula, flow 1 L/min    Objective:     Has the patient been evaluated by SLP for swallowing?   Yes  Keep patient NPO? Yes   Current Respiratory Status:        ST provided oral care prior to po intake via moistened toothette. Pt with adequate tolerance of oral care. ST provided total A to consume the following items: x8 straw sips of water and x6 tsp bites of pudding. Pt with dcr labial seal with straw and spoon trials, with R-sided anterior spillage noted. Pt with dcr awareness of anterior loss and dcr ability to implement strategies to A with increasing seal pressure. Pt with cont mod-severe oral holding of pureed boluses in the oral cavity, as well as attempting to speak with boluses in the oral cavity. Pt  requiring consistent verbal and tactile cues to initiate swallows and clear the oral cavity. No overt s/s of aspiration across trials.     ST spoke with the pt's dtr regarding cont NPO, with pleasure feedings of puree and thin liquids until GOC meeting scheduled for today. Pt's dtr in agreement with recs.     Goals:   Multidisciplinary Problems       SLP Goals          Problem: SLP    Goal Priority Disciplines Outcome   SLP Goal    Medium SLP Ongoing, Progressing   Description: Updated ST. Pt will participate in swallow eval to determine safest diet level- ongoing still NPO per SLP recs  2. Pt and family will be educated on pleasure feeds, oral care, safest diet with minimal restrictions and importance of oral nutrition vs non oral means.   3. Palliative and GI to be consulted to delineate GOC in regards to long term plans and what a feeding tube vs oral diet would entail and aspiration risk for the patient.                        Plan:     Patient to be seen:  3 x/week   Plan of Care expires:  23  Plan of Care reviewed with:  patient, daughter   SLP Follow-Up:  Yes       Discharge recommendations:   (home, needs 24 hour care)   Barriers to Discharge:  None    Time Tracking:     SLP Treatment Date:   23  Speech Start Time:  930  Speech Stop Time:  946     Speech Total Time (min):  16 min    Billable Minutes: Treatment Swallowing Dysfunction 16    2023

## 2023-07-18 ENCOUNTER — ANESTHESIA EVENT (OUTPATIENT)
Dept: ENDOSCOPY | Facility: HOSPITAL | Age: 78
DRG: 871 | End: 2023-07-18
Payer: MEDICARE

## 2023-07-18 ENCOUNTER — ANESTHESIA (OUTPATIENT)
Dept: ENDOSCOPY | Facility: HOSPITAL | Age: 78
DRG: 871 | End: 2023-07-18
Payer: MEDICARE

## 2023-07-18 PROBLEM — N17.9 AKI (ACUTE KIDNEY INJURY): Status: ACTIVE | Noted: 2023-07-18

## 2023-07-18 PROBLEM — A41.9 SEPSIS DUE TO PNEUMONIA: Status: ACTIVE | Noted: 2023-07-18

## 2023-07-18 PROBLEM — J18.9 SEPSIS DUE TO PNEUMONIA: Status: ACTIVE | Noted: 2023-07-18

## 2023-07-18 LAB
ALBUMIN SERPL BCP-MCNC: 1.8 G/DL (ref 3.5–5.2)
ALP SERPL-CCNC: 168 U/L (ref 55–135)
ALT SERPL W/O P-5'-P-CCNC: 138 U/L (ref 10–44)
ANION GAP SERPL CALC-SCNC: 11 MMOL/L (ref 8–16)
ANISOCYTOSIS BLD QL SMEAR: SLIGHT
AST SERPL-CCNC: 292 U/L (ref 10–40)
BACTERIA BLD CULT: NORMAL
BASOPHILS NFR BLD: 0 % (ref 0–1.9)
BILIRUB SERPL-MCNC: 0.2 MG/DL (ref 0.1–1)
BUN SERPL-MCNC: 13 MG/DL (ref 8–23)
BURR CELLS BLD QL SMEAR: ABNORMAL
CALCIUM SERPL-MCNC: 7.7 MG/DL (ref 8.7–10.5)
CHLORIDE SERPL-SCNC: 115 MMOL/L (ref 95–110)
CO2 SERPL-SCNC: 16 MMOL/L (ref 23–29)
CREAT SERPL-MCNC: 0.7 MG/DL (ref 0.5–1.4)
DIFFERENTIAL METHOD: ABNORMAL
EOSINOPHIL NFR BLD: 5 % (ref 0–8)
ERYTHROCYTE [DISTWIDTH] IN BLOOD BY AUTOMATED COUNT: 20.2 % (ref 11.5–14.5)
EST. GFR  (NO RACE VARIABLE): >60 ML/MIN/1.73 M^2
GLUCOSE SERPL-MCNC: 78 MG/DL (ref 70–110)
HCT VFR BLD AUTO: 24.4 % (ref 37–48.5)
HGB BLD-MCNC: 7.2 G/DL (ref 12–16)
HYPOCHROMIA BLD QL SMEAR: ABNORMAL
IMM GRANULOCYTES # BLD AUTO: ABNORMAL K/UL (ref 0–0.04)
IMM GRANULOCYTES NFR BLD AUTO: ABNORMAL % (ref 0–0.5)
LYMPHOCYTES NFR BLD: 6 % (ref 18–48)
MAGNESIUM SERPL-MCNC: 2.3 MG/DL (ref 1.6–2.6)
MCH RBC QN AUTO: 23.7 PG (ref 27–31)
MCHC RBC AUTO-ENTMCNC: 29.5 G/DL (ref 32–36)
MCV RBC AUTO: 80 FL (ref 82–98)
MONOCYTES NFR BLD: 4 % (ref 4–15)
MYELOCYTES NFR BLD MANUAL: 1 %
NEUTROPHILS NFR BLD: 77 % (ref 38–73)
NEUTS BAND NFR BLD MANUAL: 7 %
NRBC BLD-RTO: 2 /100 WBC
PHOSPHATE SERPL-MCNC: 2.8 MG/DL (ref 2.7–4.5)
PLATELET # BLD AUTO: 160 K/UL (ref 150–450)
PLATELET BLD QL SMEAR: ABNORMAL
PMV BLD AUTO: 11.9 FL (ref 9.2–12.9)
POCT GLUCOSE: 104 MG/DL (ref 70–110)
POCT GLUCOSE: 79 MG/DL (ref 70–110)
POCT GLUCOSE: 82 MG/DL (ref 70–110)
POCT GLUCOSE: 92 MG/DL (ref 70–110)
POIKILOCYTOSIS BLD QL SMEAR: SLIGHT
POLYCHROMASIA BLD QL SMEAR: ABNORMAL
POTASSIUM SERPL-SCNC: 5.2 MMOL/L (ref 3.5–5.1)
PROT SERPL-MCNC: 5.5 G/DL (ref 6–8.4)
RBC # BLD AUTO: 3.04 M/UL (ref 4–5.4)
SARS-COV-2 RDRP RESP QL NAA+PROBE: NEGATIVE
SODIUM SERPL-SCNC: 142 MMOL/L (ref 136–145)
WBC # BLD AUTO: 11.1 K/UL (ref 3.9–12.7)

## 2023-07-18 PROCEDURE — 11000001 HC ACUTE MED/SURG PRIVATE ROOM

## 2023-07-18 PROCEDURE — 85007 BL SMEAR W/DIFF WBC COUNT: CPT | Performed by: STUDENT IN AN ORGANIZED HEALTH CARE EDUCATION/TRAINING PROGRAM

## 2023-07-18 PROCEDURE — 37000008 HC ANESTHESIA 1ST 15 MINUTES: Performed by: INTERNAL MEDICINE

## 2023-07-18 PROCEDURE — 86580 TB INTRADERMAL TEST: CPT | Performed by: INTERNAL MEDICINE

## 2023-07-18 PROCEDURE — 25000003 PHARM REV CODE 250

## 2023-07-18 PROCEDURE — 43246 EGD PLACE GASTROSTOMY TUBE: CPT | Performed by: INTERNAL MEDICINE

## 2023-07-18 PROCEDURE — D9220A PRA ANESTHESIA: Mod: ANES,,, | Performed by: ANESTHESIOLOGY

## 2023-07-18 PROCEDURE — 43246 PR EGD, FLEX, W/PLCMT, GASTROSTOMY TUBE: ICD-10-PCS | Mod: ,,, | Performed by: INTERNAL MEDICINE

## 2023-07-18 PROCEDURE — 63600175 PHARM REV CODE 636 W HCPCS

## 2023-07-18 PROCEDURE — D9220A PRA ANESTHESIA: ICD-10-PCS | Mod: CRNA,,, | Performed by: NURSE ANESTHETIST, CERTIFIED REGISTERED

## 2023-07-18 PROCEDURE — 99233 SBSQ HOSP IP/OBS HIGH 50: CPT | Mod: ,,, | Performed by: STUDENT IN AN ORGANIZED HEALTH CARE EDUCATION/TRAINING PROGRAM

## 2023-07-18 PROCEDURE — 63600175 PHARM REV CODE 636 W HCPCS: Performed by: NURSE ANESTHETIST, CERTIFIED REGISTERED

## 2023-07-18 PROCEDURE — 43246 EGD PLACE GASTROSTOMY TUBE: CPT | Mod: ,,, | Performed by: INTERNAL MEDICINE

## 2023-07-18 PROCEDURE — 36415 COLL VENOUS BLD VENIPUNCTURE: CPT | Performed by: STUDENT IN AN ORGANIZED HEALTH CARE EDUCATION/TRAINING PROGRAM

## 2023-07-18 PROCEDURE — 25000003 PHARM REV CODE 250: Performed by: STUDENT IN AN ORGANIZED HEALTH CARE EDUCATION/TRAINING PROGRAM

## 2023-07-18 PROCEDURE — S5010 5% DEXTROSE AND 0.45% SALINE: HCPCS

## 2023-07-18 PROCEDURE — U0002 COVID-19 LAB TEST NON-CDC: HCPCS | Performed by: INTERNAL MEDICINE

## 2023-07-18 PROCEDURE — D9220A PRA ANESTHESIA: Mod: CRNA,,, | Performed by: NURSE ANESTHETIST, CERTIFIED REGISTERED

## 2023-07-18 PROCEDURE — 25000003 PHARM REV CODE 250: Performed by: NURSE ANESTHETIST, CERTIFIED REGISTERED

## 2023-07-18 PROCEDURE — 30200315 PPD INTRADERMAL TEST REV CODE 302: Performed by: INTERNAL MEDICINE

## 2023-07-18 PROCEDURE — 99233 PR SUBSEQUENT HOSPITAL CARE,LEVL III: ICD-10-PCS | Mod: ,,, | Performed by: STUDENT IN AN ORGANIZED HEALTH CARE EDUCATION/TRAINING PROGRAM

## 2023-07-18 PROCEDURE — S5010 5% DEXTROSE AND 0.45% SALINE: HCPCS | Performed by: STUDENT IN AN ORGANIZED HEALTH CARE EDUCATION/TRAINING PROGRAM

## 2023-07-18 PROCEDURE — 83735 ASSAY OF MAGNESIUM: CPT | Performed by: STUDENT IN AN ORGANIZED HEALTH CARE EDUCATION/TRAINING PROGRAM

## 2023-07-18 PROCEDURE — 37000009 HC ANESTHESIA EA ADD 15 MINS: Performed by: INTERNAL MEDICINE

## 2023-07-18 PROCEDURE — 27201018 HC KIT, PEG (ANY): Performed by: INTERNAL MEDICINE

## 2023-07-18 PROCEDURE — 84100 ASSAY OF PHOSPHORUS: CPT | Performed by: STUDENT IN AN ORGANIZED HEALTH CARE EDUCATION/TRAINING PROGRAM

## 2023-07-18 PROCEDURE — 85027 COMPLETE CBC AUTOMATED: CPT | Performed by: STUDENT IN AN ORGANIZED HEALTH CARE EDUCATION/TRAINING PROGRAM

## 2023-07-18 PROCEDURE — 80053 COMPREHEN METABOLIC PANEL: CPT

## 2023-07-18 PROCEDURE — D9220A PRA ANESTHESIA: ICD-10-PCS | Mod: ANES,,, | Performed by: ANESTHESIOLOGY

## 2023-07-18 RX ORDER — ETOMIDATE 2 MG/ML
INJECTION INTRAVENOUS
Status: DISCONTINUED | OUTPATIENT
Start: 2023-07-18 | End: 2023-07-18

## 2023-07-18 RX ORDER — PROPOFOL 10 MG/ML
VIAL (ML) INTRAVENOUS CONTINUOUS PRN
Status: DISCONTINUED | OUTPATIENT
Start: 2023-07-18 | End: 2023-07-18

## 2023-07-18 RX ORDER — LIDOCAINE HYDROCHLORIDE 20 MG/ML
INJECTION INTRAVENOUS
Status: DISCONTINUED | OUTPATIENT
Start: 2023-07-18 | End: 2023-07-18

## 2023-07-18 RX ORDER — DEXTROSE MONOHYDRATE AND SODIUM CHLORIDE 5; .45 G/100ML; G/100ML
INJECTION, SOLUTION INTRAVENOUS CONTINUOUS
Status: DISCONTINUED | OUTPATIENT
Start: 2023-07-18 | End: 2023-07-19

## 2023-07-18 RX ORDER — DEXTROSE MONOHYDRATE AND SODIUM CHLORIDE 5; .45 G/100ML; G/100ML
INJECTION, SOLUTION INTRAVENOUS CONTINUOUS
Status: ACTIVE | OUTPATIENT
Start: 2023-07-18 | End: 2023-07-18

## 2023-07-18 RX ORDER — ENOXAPARIN SODIUM 100 MG/ML
1 INJECTION SUBCUTANEOUS EVERY 12 HOURS
Status: DISCONTINUED | OUTPATIENT
Start: 2023-07-18 | End: 2023-07-19

## 2023-07-18 RX ADMIN — LIDOCAINE HYDROCHLORIDE 100 MG: 20 INJECTION, SOLUTION INTRAVENOUS at 03:07

## 2023-07-18 RX ADMIN — SODIUM CHLORIDE: 0.9 INJECTION, SOLUTION INTRAVENOUS at 03:07

## 2023-07-18 RX ADMIN — ACETAMINOPHEN 650 MG: 325 TABLET ORAL at 11:07

## 2023-07-18 RX ADMIN — TUBERCULIN PURIFIED PROTEIN DERIVATIVE 5 UNITS: 5 INJECTION, SOLUTION INTRADERMAL at 04:07

## 2023-07-18 RX ADMIN — ETOMIDATE 2 MG: 2 INJECTION, SOLUTION INTRAVENOUS at 03:07

## 2023-07-18 RX ADMIN — DEXTROSE 2 G: 50 INJECTION, SOLUTION INTRAVENOUS at 03:07

## 2023-07-18 RX ADMIN — CEFTRIAXONE SODIUM 2 G: 2 INJECTION, POWDER, FOR SOLUTION INTRAMUSCULAR; INTRAVENOUS at 04:07

## 2023-07-18 RX ADMIN — DEXTROSE MONOHYDRATE AND SODIUM CHLORIDE: 5; .45 INJECTION, SOLUTION INTRAVENOUS at 07:07

## 2023-07-18 RX ADMIN — PROPOFOL 100 MCG/KG/MIN: 10 INJECTION, EMULSION INTRAVENOUS at 03:07

## 2023-07-18 RX ADMIN — LIDOCAINE PATCH 5% 1 PATCH: 700 PATCH TOPICAL at 09:07

## 2023-07-18 RX ADMIN — ENOXAPARIN SODIUM 50 MG: 60 INJECTION SUBCUTANEOUS at 09:07

## 2023-07-18 RX ADMIN — DEXTROSE AND SODIUM CHLORIDE: 5; 450 INJECTION, SOLUTION INTRAVENOUS at 11:07

## 2023-07-18 NOTE — ASSESSMENT & PLAN NOTE
Diagnosis:  Inadequate energy intake    Related to (etiology):   dysphagia    Signs and Symptoms (as evidenced by):   SLP recs NPO, await PEG tube, NPO x 7 days    Interventions:  Collaboration with other providers    Nutrition Diagnosis Status:   Improving (TF starting today)

## 2023-07-18 NOTE — ANESTHESIA PREPROCEDURE EVALUATION
07/18/2023  Nathan Simpson is a 77 y.o., female.      Pre-op Assessment    I have reviewed the Patient Summary Reports.     I have reviewed the Nursing Notes. I have reviewed the NPO Status.   I have reviewed the Medications.     Review of Systems  Anesthesia Hx:  Denies Family Hx of Anesthesia complications.   Denies Personal Hx of Anesthesia complications.   Pulmonary:   Stage 4 lung cancer  dnr   Neurological:   CVA Multiple CVA's with deficits   Psych:   Psychiatric History dementia         Physical Exam    Airway:  Mallampati: II           Anesthesia Plan  Type of Anesthesia, risks & benefits discussed:    Anesthesia Type: Gen Natural Airway  Intra-op Monitoring Plan: Standard ASA Monitors  Post Op Pain Control Plan: multimodal analgesia  Induction:  IV  Informed Consent: Informed consent signed with the Patient and all parties understand the risks and agree with anesthesia plan.  All questions answered.   ASA Score: 4  Day of Surgery Review of History & Physical: H&P Update referred to the surgeon/provider.    Ready For Surgery From Anesthesia Perspective.     .
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
## 2023-07-18 NOTE — OR NURSING
Patient scheduled for EGD w/PEG tube insertion.  Chart reviewed and report taken from nurse. Family at bedside. DNR order remains in place, on O2 at 1L/M; NPO since MN, IVF in progress. Abnormal las noted.
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
## 2023-07-18 NOTE — PROGRESS NOTES
"[facility]  Adult [facility]  Progress Note    SUMMARY       Recommendations    Recommendation:  1. When PEG tube placed, initiate TF of Isosource 1.5 at 10ml/hr and advance as tolerated to goal rate of 50ml/hr which would provide 1800 kcal, 81g protein, & 916ml free water.   2. If bolus feeds desired, pt would need 4-5 cartons of Isosource 1.5 per day.   3. Monitor weight/labs.   4. Await further SLP recs.   5. RD to follow to monitor nutrition status    Goals:  TF or diet will be started by RD follow up  Nutrition Goal Status: new  Communication of RD Recs: reviewed with RN (Alexandrea)    Assessment and Plan  Renal/  * Hypernatremia  Diagnosis:  Inadequate energy intake    Related to (etiology):   dysphagia    Signs and Symptoms (as evidenced by):   SLP recs NPO, await PEG tube, NPO x 7 days    Interventions:  Collaboration with other providers    Nutrition Diagnosis Status:   New       Malnutrition Assessment  Unable to assess NFPE at visit      Reason for Assessment  Reason For Assessment: NPO/clear liquids x 5 days  Diagnosis:  (hypernatremia)  Relevant Medical History: pancreatitis, anemia, lung cancer, dementia, stroke, PEG removal, trach removal  General Information Comments: Pt NPO per SLP recs. Noted family agreeable to PEG tube. Possible PEG placement tomorrow. Hector 12- R leg skin tear. Pt and family with palliative team at visit-unable to assess NFPE. Weight inaccuracy: 7/10 135lb and 109lb  Nutrition Discharge Planning: d/c needs to be determined    Nutrition Risk Screen  Nutrition Risk Screen: difficulty chewing/swallowing    Nutrition/Diet History  Food Preferences: no Scientologist or cultural food prefs identified  Spiritual, Cultural Beliefs, Baptist Practices, Values that Affect Care: no  Factors Affecting Nutritional Intake: NPO, difficulty/impaired swallowing    Anthropometrics  Temp: 96.1 °F (35.6 °C)  Height Method: Estimated  Height: 5' 11" (180.3 cm)  Height (inches): 71 in  Weight " Method: Bed Scale  Weight: 49.5 kg (109 lb 2 oz)  Weight (lb): 109.13 lb  Ideal Body Weight (IBW), Female: 155 lb  % Ideal Body Weight, Female (lb): 70.41 %  BMI (Calculated): 15.2  BMI Grade: less than 16 protein-energy malnutrition grade III     Lab/Procedures/Meds  Pertinent Labs Reviewed: reviewed  Pertinent Labs Comments: Glu 61L, Ca 8.0L, Phos 2.6L, Alb 1.8L  Pertinent Medications Reviewed: reviewed  Pertinent Medications Comments: rocephin    Estimated/Assessed Needs  Weight Used For Calorie Calculations: 49.5 kg (109 lb 2 oz)  Energy Calorie Requirements (kcal): 1732 (35 kcal/kg)  Energy Need Method: Kcal/kg  Protein Requirements: 59g (1.2g/kg)  Weight Used For Protein Calculations: 49.5 kg (109 lb 2 oz)  Estimated Fluid Requirement Method: RDA Method  RDA Method (mL): 1732     Nutrition Prescription Ordered  Current Diet Order: NPO    Evaluation of Received Nutrient/Fluid Intake  I/O: 556/350  Energy Calories Required: not meeting needs  Protein Required: not meeting needs  Fluid Required: not meeting needs  Comments: LBM 7/15  % Intake of Estimated Energy Needs: Other: NPO  % Meal Intake: NPO    Nutrition Risk  Level of Risk/Frequency of Follow-up:  (2xweekly)     Monitor and Evaluation  Food and Nutrient Intake: enteral nutrition intake  Food and Nutrient Adminstration: enteral and parenteral nutrition administration  Physical Activity and Function: nutrition-related ADLs and IADLs  Anthropometric Measurements: weight  Biochemical Data, Medical Tests and Procedures: electrolyte and renal panel  Nutrition-Focused Physical Findings: overall appearance     Nutrition Follow-Up  RD Follow-up?: Yes

## 2023-07-18 NOTE — PROVATION PATIENT INSTRUCTIONS
Discharge Summary/Instructions after an Endoscopic Procedure  Patient Name: Nathan Simpson  Patient MRN: [id number]  Patient YOB: 1945 Tuesday, July 18, 2023  Galo Duarte MD  Dear patient,  As a result of recent federal legislation (The Federal Cures Act), you may   receive lab or pathology results from your procedure in your [facility]   account before your physician is able to contact you. Your physician or   their representative will relay the results to you with their   recommendations at their soonest availability.  Thank you,  Your health is very important to us during the Covid Crisis. Following your   procedure today, you will receive a daily text for 2 weeks asking about   signs or symptoms of Covid 19.  Please respond to this text when you   receive it so we can follow up and keep you as safe as possible.   RESTRICTIONS:  During your procedure today, you received medications for sedation.  These   medications may affect your judgment, balance and coordination.  Therefore,   for 24 hours, you have the following restrictions:   - DO NOT drive a car, operate machinery, make legal/financial decisions,   sign important papers or drink alcohol.    ACTIVITY:  Today: no heavy lifting, straining or running due to procedural   sedation/anesthesia.  The following day: return to full activity including work.  DIET:  Eat and drink normally unless instructed otherwise.     TREATMENT FOR COMMON SIDE EFFECTS:  - Mild abdominal pain, nausea, belching, bloating or excessive gas:  rest,   eat lightly and use a heating pad.  - Sore Throat: treat with throat lozenges and/or gargle with warm salt   water.  - Because air was used during the procedure, expelling large amounts of air   from your rectum or belching is normal.  - If a bowel prep was taken, you may not have a bowel movement for 1-3 days.    This is normal.  SYMPTOMS TO WATCH FOR AND REPORT TO YOUR PHYSICIAN:  1. Abdominal pain or bloating, other  than gas cramps.  2. Chest pain.  3. Back pain.  4. Signs of infection such as: chills or fever occurring within 24 hours   after the procedure.  5. Rectal bleeding, which would show as bright red, maroon, or black stools.   (A tablespoon of blood from the rectum is not serious, especially if   hemorrhoids are present.)  6. Vomiting.  7. Weakness or dizziness.  GO DIRECTLY TO THE NEAREST EMERGENCY ROOM IF YOU HAVE ANY OF THE FOLLOWING:      Difficulty breathing              Chills and/or fever over 101 F   Persistent vomiting and/or vomiting blood   Severe abdominal pain   Severe chest pain   Black, tarry stools   Bleeding- more than one tablespoon   Any other symptom or condition that you feel may need urgent attention  Your doctor recommends these additional instructions:  If any biopsies were taken, your doctors clinic will contact you in 1 to 2   weeks with any results.  - [facility].   - Advance diet as tolerated.   - Continue present medications.   - Please follow the post-PEG recommendations including: Nutrition consult   for formula and volume, advance food and medications per primary care   provider, external bolster 1 cm from abdominal wall, change dressing once   per day, dry dressing only, NPO x4 hrs then water today, may use PEG today   for meds and water, may use PEG tomorrow for feedings, antibiotic ointment   to site, check site for bleeding q 4 hrs and clean site with soap and water   daily and dry thoroughly.  For questions, problems or results please call your physician - Galo Duarte MD.  EMERGENCY PHONE NUMBER: [phone],  LAB RESULTS: [phone]  IF A COMPLICATION OR EMERGENCY SITUATION ARISES AND YOU ARE UNABLE TO REACH   YOUR PHYSICIAN - GO DIRECTLY TO THE EMERGENCY ROOM.  Galo Duarte MD  7/18/2023 4:31:57 PM  This report has been verified and signed electronically.  Dear patient,  As a result of recent federal  legislation (The Federal Cures Act), you may   receive lab or pathology results from your procedure in your [facility]   account before your physician is able to contact you. Your physician or   their representative will relay the results to you with their   recommendations at their soonest availability.  Thank you,  PROVATION

## 2023-07-18 NOTE — TRANSFER OF CARE
"Anesthesia Transfer of Care Note    Patient: Nathan Simpson    Procedure(s) Performed: Procedure(s) (LRB):  EGD, WITH PEG TUBE INSERTION (N/A)    Patient location: GI    Anesthesia Type: general    Transport from OR: Transported from OR on room air with adequate spontaneous ventilation    Post pain: adequate analgesia    Post assessment: no apparent anesthetic complications and tolerated procedure well    Post vital signs: stable    Level of consciousness: awake, alert and oriented    Nausea/Vomiting: no nausea/vomiting    Complications: none    Transfer of care protocol was followed      Last vitals:   Visit Vitals  BP (!) 118/53 (Patient Position: Lying)   Pulse 81   Temp 36.8 °C (98.2 °F) (Temporal)   Resp 18   Ht 5' 11" (1.803 m)   Wt 49.5 kg (109 lb 2 oz)   SpO2 99%   BMI 15.22 kg/m²     "

## 2023-07-18 NOTE — PLAN OF CARE
Recommendation:  1. When PEG tube placed, initiate TF of Isosource 1.5 at 10ml/hr and advance as tolerated to goal rate of 50ml/hr which would provide 1800 kcal, 81g protein, & 916ml free water.   2. If bolus feeds desired, pt would need 4-5 cartons of Isosource 1.5 per day.   3. Monitor weight/labs.   4. Await further SLP recs.   5. RD to follow to monitor nutrition status    Goals:  TF or diet will be started by RD follow up  Nutrition Goal Status: new

## 2023-07-18 NOTE — CONSULTS
Consult received for skin tear of leg/sacrum. RD already following pt. See nutrition note for details. RD to continue to follow throughout admit.

## 2023-07-18 NOTE — PLAN OF CARE
Patient recovered to baseline.  VSS.  Patient seen by MD at bedside.  Patient escorted back to hospital room w/transporter.  Report given to bedside nurse JUSTO Arnett.  Nurse verbalized understanding.  All questions answered.
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
## 2023-07-18 NOTE — PLAN OF CARE
"   07/18/23 1426   Post-Acute Status   Post-Acute Authorization Placement   Post-Acute Placement Status Referrals Sent  (Referrals sent per family preference. Discussed significant barriers. Wants to continue Oral chemo med. Will supply to facility from home if needed. SW consult faxed and emialed to N. Pending PEG.)   Discharge Delays (!) Post-Acute Set-up  (Family is presently declining hospice at present. Wants PEG and to continue Oral Chemo med TAGRISSO. Referrals sent. Assigned SW notified.)   Discharge Plan   Discharge Plan A Skilled Nursing Facility;New Nursing Home placement - longterm care facility  (If SNF auth declined, senior care NH vs Home with HH.)     Daughter also prepared that if SNF is denied she will have to cover longterm costs for NH. [facility] choice [facility] in [place].    Future Appointments   Date Time Provider Department Center   7/24/2023  1:00 PM Garry Mcqueen MD [facility] HEM ONC [facility]   8/9/2023  1:40 PM Garry Mcqueen MD [facility] HEM ONC [facility]     BP (!) 112/54 (BP Location: Right arm, Patient Position: Lying)   Pulse 73   Temp 97 °F (36.1 °C) (Axillary)   Resp 20   Ht 5' 11" (1.803 m)   Wt 49.5 kg (109 lb 2 oz)   SpO2 95%   BMI 15.22 kg/m²      cefTRIAXone (ROCEPHIN) IVPB  2 g Intravenous Q24H    enoxparin  1 mg/kg Subcutaneous Q12H (treatment, non-standard time)    LIDOcaine  1 patch Transdermal Q24H    morphine  2 mg Intramuscular Once     Consults (From admission, onward)          Status Ordering Provider     Inpatient consult to Social Work  Once        Provider:  (Not yet assigned)    Ordered AUBREY GUEVARA     Inpatient consult to Registered Dietitian/Nutritionist  Once        Provider:  (Not yet assigned)    Completed AUBREY GUEVARA     Inpatient consult to Social Work  Once        Provider:  (Not yet assigned)    Acknowledged JETHRO REYES     Inpatient consult to Gastroenterology-[facility]  Once        Provider:  Moises Luna MD    " Completed THOMAS SEGAL     Inpatient consult to Midline team  Once        Provider:  (Not yet assigned)    Completed THOMAS SEGAL     Inpatient consult to Midline team  Once        Provider:  (Not yet assigned)    Completed ARSENIO HAWTHORNE     Inpatient consult to Palliative Care  Once        Provider:  Bill Lawson Jr., MD    Completed ARSENIO HAWTHORNE     Inpatient consult to Hematology/Oncology  Once        Provider:  Garry Mcqueen MD    Completed ARSENIO HAWTHORNE     Inpatient consult to [facility]  Once        Provider:  Yuriy Garcia MD    Completed ARSENIO HAWTHORNE     IP consult to case management  Once        Provider:  (Not yet assigned)    Completed BABATUNDE CHAN

## 2023-07-19 LAB
ALBUMIN SERPL BCP-MCNC: 1.7 G/DL (ref 3.5–5.2)
ALP SERPL-CCNC: 159 U/L (ref 55–135)
ALT SERPL W/O P-5'-P-CCNC: 96 U/L (ref 10–44)
ANION GAP SERPL CALC-SCNC: 8 MMOL/L (ref 8–16)
AST SERPL-CCNC: 164 U/L (ref 10–40)
BASOPHILS # BLD AUTO: 0.04 K/UL (ref 0–0.2)
BASOPHILS NFR BLD: 0.3 % (ref 0–1.9)
BILIRUB SERPL-MCNC: 0.2 MG/DL (ref 0.1–1)
BUN SERPL-MCNC: 6 MG/DL (ref 8–23)
CALCIUM SERPL-MCNC: 7.6 MG/DL (ref 8.7–10.5)
CHLORIDE SERPL-SCNC: 116 MMOL/L (ref 95–110)
CO2 SERPL-SCNC: 16 MMOL/L (ref 23–29)
CREAT SERPL-MCNC: 0.7 MG/DL (ref 0.5–1.4)
DIFFERENTIAL METHOD: ABNORMAL
EOSINOPHIL # BLD AUTO: 0.2 K/UL (ref 0–0.5)
EOSINOPHIL NFR BLD: 1.2 % (ref 0–8)
ERYTHROCYTE [DISTWIDTH] IN BLOOD BY AUTOMATED COUNT: 20.6 % (ref 11.5–14.5)
EST. GFR  (NO RACE VARIABLE): >60 ML/MIN/1.73 M^2
GLUCOSE SERPL-MCNC: 91 MG/DL (ref 70–110)
HCT VFR BLD AUTO: 25 % (ref 37–48.5)
HGB BLD-MCNC: 7.1 G/DL (ref 12–16)
IMM GRANULOCYTES # BLD AUTO: 0.35 K/UL (ref 0–0.04)
IMM GRANULOCYTES NFR BLD AUTO: 2.6 % (ref 0–0.5)
LYMPHOCYTES # BLD AUTO: 1.5 K/UL (ref 1–4.8)
LYMPHOCYTES NFR BLD: 11 % (ref 18–48)
MAGNESIUM SERPL-MCNC: 1.9 MG/DL (ref 1.6–2.6)
MCH RBC QN AUTO: 23.2 PG (ref 27–31)
MCHC RBC AUTO-ENTMCNC: 28.4 G/DL (ref 32–36)
MCV RBC AUTO: 82 FL (ref 82–98)
MONOCYTES # BLD AUTO: 0.5 K/UL (ref 0.3–1)
MONOCYTES NFR BLD: 3.6 % (ref 4–15)
NEUTROPHILS # BLD AUTO: 11 K/UL (ref 1.8–7.7)
NEUTROPHILS NFR BLD: 81.3 % (ref 38–73)
NRBC BLD-RTO: 0 /100 WBC
PHOSPHATE SERPL-MCNC: 2 MG/DL (ref 2.7–4.5)
PLATELET # BLD AUTO: 149 K/UL (ref 150–450)
PMV BLD AUTO: 12.2 FL (ref 9.2–12.9)
POCT GLUCOSE: 80 MG/DL (ref 70–110)
POCT GLUCOSE: 83 MG/DL (ref 70–110)
POCT GLUCOSE: 87 MG/DL (ref 70–110)
POCT GLUCOSE: 94 MG/DL (ref 70–110)
POTASSIUM SERPL-SCNC: 3.5 MMOL/L (ref 3.5–5.1)
PROT SERPL-MCNC: 5.2 G/DL (ref 6–8.4)
RBC # BLD AUTO: 3.06 M/UL (ref 4–5.4)
SODIUM SERPL-SCNC: 140 MMOL/L (ref 136–145)
WBC # BLD AUTO: 13.55 K/UL (ref 3.9–12.7)

## 2023-07-19 PROCEDURE — 36415 COLL VENOUS BLD VENIPUNCTURE: CPT | Performed by: STUDENT IN AN ORGANIZED HEALTH CARE EDUCATION/TRAINING PROGRAM

## 2023-07-19 PROCEDURE — 99232 PR SUBSEQUENT HOSPITAL CARE,LEVL II: ICD-10-PCS | Mod: GC,,, | Performed by: INTERNAL MEDICINE

## 2023-07-19 PROCEDURE — 25000003 PHARM REV CODE 250: Performed by: STUDENT IN AN ORGANIZED HEALTH CARE EDUCATION/TRAINING PROGRAM

## 2023-07-19 PROCEDURE — 94761 N-INVAS EAR/PLS OXIMETRY MLT: CPT

## 2023-07-19 PROCEDURE — 63600175 PHARM REV CODE 636 W HCPCS

## 2023-07-19 PROCEDURE — 84100 ASSAY OF PHOSPHORUS: CPT | Performed by: STUDENT IN AN ORGANIZED HEALTH CARE EDUCATION/TRAINING PROGRAM

## 2023-07-19 PROCEDURE — 25000003 PHARM REV CODE 250

## 2023-07-19 PROCEDURE — 99232 SBSQ HOSP IP/OBS MODERATE 35: CPT | Mod: GC,,, | Performed by: INTERNAL MEDICINE

## 2023-07-19 PROCEDURE — 80053 COMPREHEN METABOLIC PANEL: CPT

## 2023-07-19 PROCEDURE — 27000221 HC OXYGEN, UP TO 24 HOURS

## 2023-07-19 PROCEDURE — 83735 ASSAY OF MAGNESIUM: CPT | Performed by: STUDENT IN AN ORGANIZED HEALTH CARE EDUCATION/TRAINING PROGRAM

## 2023-07-19 PROCEDURE — 85025 COMPLETE CBC W/AUTO DIFF WBC: CPT | Performed by: STUDENT IN AN ORGANIZED HEALTH CARE EDUCATION/TRAINING PROGRAM

## 2023-07-19 PROCEDURE — 11000001 HC ACUTE MED/SURG PRIVATE ROOM

## 2023-07-19 RX ORDER — ATORVASTATIN CALCIUM 40 MG/1
40 TABLET, FILM COATED ORAL NIGHTLY
Status: DISCONTINUED | OUTPATIENT
Start: 2023-07-19 | End: 2023-07-19

## 2023-07-19 RX ORDER — NAPROXEN SODIUM 220 MG/1
81 TABLET, FILM COATED ORAL DAILY
Status: DISCONTINUED | OUTPATIENT
Start: 2023-07-19 | End: 2023-07-19

## 2023-07-19 RX ORDER — MORPHINE SULFATE 2 MG/ML
1 INJECTION, SOLUTION INTRAMUSCULAR; INTRAVENOUS EVERY 4 HOURS PRN
Status: DISCONTINUED | OUTPATIENT
Start: 2023-07-19 | End: 2023-07-21 | Stop reason: HOSPADM

## 2023-07-19 RX ORDER — SODIUM,POTASSIUM PHOSPHATES 280-250MG
2 POWDER IN PACKET (EA) ORAL 2 TIMES DAILY
Status: DISCONTINUED | OUTPATIENT
Start: 2023-07-19 | End: 2023-07-19

## 2023-07-19 RX ORDER — ACETAMINOPHEN 650 MG/20.3ML
650 LIQUID ORAL EVERY 6 HOURS PRN
Status: DISCONTINUED | OUTPATIENT
Start: 2023-07-19 | End: 2023-07-21 | Stop reason: HOSPADM

## 2023-07-19 RX ORDER — ONDANSETRON 2 MG/ML
4 INJECTION INTRAMUSCULAR; INTRAVENOUS EVERY 6 HOURS PRN
Status: DISCONTINUED | OUTPATIENT
Start: 2023-07-19 | End: 2023-07-21 | Stop reason: HOSPADM

## 2023-07-19 RX ADMIN — ACETAMINOPHEN 650 MG: 160 SOLUTION ORAL at 08:07

## 2023-07-19 RX ADMIN — POTASSIUM & SODIUM PHOSPHATES POWDER PACK 280-160-250 MG 2 PACKET: 280-160-250 PACK at 08:07

## 2023-07-19 RX ADMIN — ASPIRIN 81 MG: 81 TABLET, CHEWABLE ORAL at 08:07

## 2023-07-19 RX ADMIN — ACETAMINOPHEN 650 MG: 325 TABLET ORAL at 08:07

## 2023-07-19 RX ADMIN — ENOXAPARIN SODIUM 50 MG: 60 INJECTION SUBCUTANEOUS at 08:07

## 2023-07-19 RX ADMIN — LIDOCAINE PATCH 5% 1 PATCH: 700 PATCH TOPICAL at 08:07

## 2023-07-19 RX ADMIN — ACETAMINOPHEN 650 MG: 160 SOLUTION ORAL at 03:07
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
## 2023-07-19 NOTE — PLAN OF CARE
AURA met with pt at bedside to complete DCA this AM. Pt lives at home with her Brother Raghav [phone] and will have him help transport her home at time of d/c. Pt reported having a rollator, cane, sc, and O2 supplied by [facility] [employer]. Pt inquired about nursing home nursing homes. SW informed pt that would have to be done on an out patient bases, sw will add information to pt's avs. Pt [facility] HD [facility] 10am. SW will request f/u appts. Pt did not have any additional questions or concerns at this time. White board updated with CM name and contact information.  Discharge brochure provided.  Pt encouraged to call with any questions or concerns.  Cm will continue to follow pt through transitions of care and assist with any discharge needs.    BASIM Wilde  [phone]    Future Appointments   Date Time Provider Department Center   7/24/2023  1:00 PM Garry Mcqueen MD [facility] HEM ONC [facility]   8/9/2023  1:40 PM Garry Mcqueen MD [facility] HEM ONC [place] [facility]        07/19/23 1016   Discharge Assessment   Assessment Type Discharge Planning Assessment   Confirmed/corrected address, phone number and insurance Yes   Confirmed Demographics Correct on Facesheet   Source of Information patient   When was your last doctors appointment?   (per pt last month)   Communicated JUDY with patient/caregiver Yes   Reason For Admission failure to thrive in adult   People in Home sibling(s)   Facility Arrived From: home   Do you expect to return to your current living situation? Yes   Who are your caregiver(s) and their phone number(s)? Brother Raghav [phone]   Prior to hospitilization cognitive status: Alert/Oriented   Current cognitive status: Alert/Oriented   Walking or Climbing Stairs ambulation difficulty, requires equipment   Dressing/Bathing bathing difficulty, requires equipment   Do you have any problems with: Errands/Grocery   Home Accessibility wheelchair accessible   Home Layout Able to live on 1st  floor   Equipment Currently Used at Home rollator;cane, straight;shower chair;oxygen   Readmission within 30 days? No   Patient currently being followed by outpatient case management? No   Do you currently have service(s) that help you manage your care at home? Yes   Name and Contact number of agency [facility]   Is the pt/caregiver preference to resume services with current agency Yes   Do you take prescription medications? Yes   Do you have prescription coverage? Yes   Coverage PHN   Do you have any problems affording any of your prescribed medications? No   Is the patient taking medications as prescribed? yes   Who is going to help you get home at discharge? Brother Raghav [phone]   How do you get to doctors appointments? family or friend will provide   Are you on dialysis? Yes   Dialysis Name and Scheduled days [facility] MWF 10AM   Do you take coumadin? No   Discharge Plan A Home with family   DME Needed Upon Discharge  none   Discharge Plan discussed with: Patient   Transition of Care Barriers None   OTHER   Name(s) of People in Home Brother Raghav [phone]

## 2023-07-19 NOTE — PROGRESS NOTES
[facility] - Telemetry  Gastroenterology  Progress Note    Patient Name: Nathan Simpson  MRN: [id number]  Admission Date: 7/10/2023  Hospital Length of Stay: 9 days  Code Status: DNR   Attending Provider: Salma Russell MD  Consulting Provider: Von Barrios MD  Primary Care Physician: Edilia Carrillo MD  Principal Problem: Failure to thrive in adult        Subjective:     Interval History: Patient seen and examined this morning. Abd with a little soreness, no signs of bleeding. Bumper in appropriate position.     Review of Systems   Unable to perform ROS: Dementia   Objective:     Vital Signs (Most Recent):  Temp: 97.6 °F (36.4 °C) (07/19/23 0743)  Pulse: 78 (07/19/23 0743)  Resp: 18 (07/19/23 0743)  BP: (!) 106/57 (07/19/23 0743)  SpO2: 96 % (07/19/23 0743) Vital Signs (24h Range):  Temp:  [96.4 °F (35.8 °C)-98.2 °F (36.8 °C)] 97.6 °F (36.4 °C)  Pulse:  [73-88] 78  Resp:  [14-22] 18  SpO2:  [94 %-99 %] 96 %  BP: (100-130)/(51-63) 106/57     Weight: 49.5 kg (109 lb 2 oz) (07/17/23 2044)  Body mass index is 15.22 kg/m².      Intake/Output Summary (Last 24 hours) at 7/19/2023 0821  Last data filed at 7/19/2023 0606  Gross per 24 hour   Intake --   Output 400 ml   Net -400 ml       Lines/Drains/Airways       Peripheral Intravenous Line  Duration                  Midline Catheter Insertion/Assessment  - Single Lumen 07/17/23 1500 Right basilic vein (medial side of arm) other (see comments) 1 day                     Physical Exam  Vitals and nursing note reviewed.   Constitutional:       General: She is not in acute distress.     Appearance: She is not toxic-appearing.   HENT:      Head: Normocephalic and atraumatic.      Mouth/Throat:      Mouth: Mucous membranes are dry.      Pharynx: Oropharynx is clear. No posterior oropharyngeal erythema.   Eyes:      General: No scleral icterus.     Conjunctiva/sclera: Conjunctivae normal.   Cardiovascular:      Rate and Rhythm: Normal rate and regular rhythm.      Pulses: Normal  pulses.      Heart sounds: No murmur heard.  Pulmonary:      Effort: Pulmonary effort is normal.      Breath sounds: Normal breath sounds.   Abdominal:      General: Bowel sounds are normal. There is no distension.      Palpations: Abdomen is soft.      Tenderness: There is no abdominal tenderness.      Comments: Bumper in appropriate position, moves freely   Neurological:      Mental Status: She is alert.        Significant Labs:  CBC:   Recent Labs   Lab 07/18/23  0452 07/19/23  0331   WBC 11.10 13.55*   HGB 7.2* 7.1*   HCT 24.4* 25.0*    149*     CMP:   Recent Labs   Lab 07/19/23  0331   GLU 91   CALCIUM 7.6*   ALBUMIN 1.7*   PROT 5.2*      K 3.5   CO2 16*   *   BUN 6*   CREATININE 0.7   ALKPHOS 159*   ALT 96*   *   BILITOT 0.2         Significant Imaging:  Imaging results within the past 24 hours have been reviewed.    Assessment/Plan:     Other  Malnutrition   * Failure to thrive in adult  - has had minimal PO intake leading to presentation with little improvement during admission  - SLP continuing to recommend NPO  - PEG tube in appropriate position, may start tube feeds slowly following dietitian consult if not already following          Thank you for your consult.     Von Barrios MD  Gastroenterology  [facility]

## 2023-07-19 NOTE — ANESTHESIA POSTPROCEDURE EVALUATION
Anesthesia Post Evaluation    Patient: Ethyl M Calvin    Procedure(s) Performed: Procedure(s) (LRB):  EGD, WITH PEG TUBE INSERTION (N/A)    Final Anesthesia Type: general      Patient location during evaluation: PACU  Patient participation: Yes- Able to Participate  Level of consciousness: awake and alert  Post-procedure vital signs: reviewed and stable  Pain management: adequate  Airway patency: patent    PONV status at discharge: No PONV  Anesthetic complications: no      Cardiovascular status: stable  Respiratory status: spontaneous ventilation  Hydration status: euvolemic  Follow-up not needed.          Vitals Value Taken Time   /57 07/19/23 0743   Temp 36.4 °C (97.6 °F) 07/19/23 0743   Pulse 78 07/19/23 0743   Resp 18 07/19/23 0743   SpO2 96 % 07/19/23 0743         Event Time   Out of Recovery 07/18/2023 16:31:38         Pain/Karis Score: Pain Rating Prior to Med Admin: 3 (7/19/2023  8:43 AM)  Karis Score: 10 (7/18/2023  4:25 PM)

## 2023-07-19 NOTE — PLAN OF CARE
Problem: Adult Inpatient Plan of Care  Goal: Plan of Care Review  Outcome: Ongoing, Progressing   VN note:   Patient's chart, labs and vital signs reviewed, will be available to intervene if needed.      Mother states that patients stool is a different color, anywhere from brown to green to red, patient recently diagnosed with ear infection and has been on an antibiotic for a month now, currently on cefdinir, advised mother that antibiotics like cefdinir can change color of stool to red/orange and if patient is otherwise acting WNL to continue to monitor and contact PCP if needed, she confirmed her understanding, advised mother that we will leave a can of alamino jr at  but to contact [facility] for further recommendation for elecare jr or purchase alfamino on [facility] as it can be overnighted, she confirmed her understanding.

## 2023-07-19 NOTE — SUBJECTIVE & OBJECTIVE
Subjective:     Interval History: Patient seen and examined this morning. Abd with a little soreness, no signs of bleeding. Bumper in appropriate position.     Review of Systems   Unable to perform ROS: Dementia   Objective:     Vital Signs (Most Recent):  Temp: 97.6 °F (36.4 °C) (07/19/23 0743)  Pulse: 78 (07/19/23 0743)  Resp: 18 (07/19/23 0743)  BP: (!) 106/57 (07/19/23 0743)  SpO2: 96 % (07/19/23 0743) Vital Signs (24h Range):  Temp:  [96.4 °F (35.8 °C)-98.2 °F (36.8 °C)] 97.6 °F (36.4 °C)  Pulse:  [73-88] 78  Resp:  [14-22] 18  SpO2:  [94 %-99 %] 96 %  BP: (100-130)/(51-63) 106/57     Weight: 49.5 kg (109 lb 2 oz) (07/17/23 2044)  Body mass index is 15.22 kg/m².      Intake/Output Summary (Last 24 hours) at 7/19/2023 0821  Last data filed at 7/19/2023 0606  Gross per 24 hour   Intake --   Output 400 ml   Net -400 ml       Lines/Drains/Airways       Peripheral Intravenous Line  Duration                  Midline Catheter Insertion/Assessment  - Single Lumen 07/17/23 1500 Right basilic vein (medial side of arm) other (see comments) 1 day                     Physical Exam  Vitals and nursing note reviewed.   Constitutional:       General: She is not in acute distress.     Appearance: She is not toxic-appearing.   HENT:      Head: Normocephalic and atraumatic.      Mouth/Throat:      Mouth: Mucous membranes are dry.      Pharynx: Oropharynx is clear. No posterior oropharyngeal erythema.   Eyes:      General: No scleral icterus.     Conjunctiva/sclera: Conjunctivae normal.   Cardiovascular:      Rate and Rhythm: Normal rate and regular rhythm.      Pulses: Normal pulses.      Heart sounds: No murmur heard.  Pulmonary:      Effort: Pulmonary effort is normal.      Breath sounds: Normal breath sounds.   Abdominal:      General: Bowel sounds are normal. There is no distension.      Palpations: Abdomen is soft.      Tenderness: There is no abdominal tenderness.      Comments: Bumper in appropriate position, moves  freely   Neurological:      Mental Status: She is alert.        Significant Labs:  CBC:   Recent Labs   Lab 07/18/23  0452 07/19/23  0331   WBC 11.10 13.55*   HGB 7.2* 7.1*   HCT 24.4* 25.0*    149*     CMP:   Recent Labs   Lab 07/19/23 0331   GLU 91   CALCIUM 7.6*   ALBUMIN 1.7*   PROT 5.2*      K 3.5   CO2 16*   *   BUN 6*   CREATININE 0.7   ALKPHOS 159*   ALT 96*   *   BILITOT 0.2         Significant Imaging:  Imaging results within the past 24 hours have been reviewed.

## 2023-07-19 NOTE — ASSESSMENT & PLAN NOTE
- has had minimal PO intake leading to presentation with little improvement during admission  - SLP continuing to recommend NPO  - PEG tube in appropriate position, may start tube feeds slowly following dietitian consult if not already following

## 2023-07-19 NOTE — PLAN OF CARE
Recommendation:  1. Initiate TF of Isosource 1.5 and advance to goal rate of 50ml/hr as tolerated.   2. Monitor weight/labs.   3. If bolus feeds desired, pt would need 4-5 cartons per day.   4. RD to continue to follow to monitor TF tolerance    Goals:  TF to meet at least 85% EEN/EPN by RD follow up  Nutrition Goal Status: new

## 2023-07-19 NOTE — PROGRESS NOTES
"[facility]  Adult [facility]  Progress Note    SUMMARY       Recommendations    Recommendation:  1. Initiate TF of Isosource 1.5 and advance to goal rate of 50ml/hr as tolerated.   2. Monitor weight/labs.   3. If bolus feeds desired, pt would need 4-5 cartons per day.   4. RD to continue to follow to monitor TF tolerance    Goals:  TF to meet at least 85% EEN/EPN by RD follow up  Nutrition Goal Status: new  Communication of RD Recs: reviewed with RN (Alexandrea)    Assessment and Plan  Renal/  Hypernatremia  Diagnosis:  Inadequate energy intake    Related to (etiology):   dysphagia    Signs and Symptoms (as evidenced by):   SLP recs NPO, await PEG tube, NPO x 7 days    Interventions:  Collaboration with other providers    Nutrition Diagnosis Status:   Improving (TF starting today)      Malnutrition Assessment  Unable to assess NFPE 2/2 pt/family with medical  team at visit     Reason for Assessment  Reason For Assessment: RD follow-up  Diagnosis:  (hypernatremia)  Relevant Medical History: pancreatitis, anemia, lung cancer, dementia, stroke, PEG removal, trach removal  General Information Comments: Pt s/p PEG tube placement yesterday 7/18. Noted TF to be started today: Isosource 1.5 with goal rate of 50ml/hr with 150ml free water flush q4h. Hector 12-R leg wound. Awaiting family decision on d/c plan. Weight inaccuracy: 7/10= 135lb and 109lb. Pt and family with medical team at visit-unable to assess NFPE.  Nutrition Discharge Planning: d/c needs to be determined    Nutrition Risk Screen  Nutrition Risk Screen: difficulty chewing/swallowing, tube feeding or parenteral nutrition    Nutrition/Diet History  Food Preferences: no Caodaism or cultural food prefs identified  Spiritual, Cultural Beliefs, Yazidism Practices, Values that Affect Care: no  Factors Affecting Nutritional Intake: NPO, difficulty/impaired swallowing    Anthropometrics  Temp: 98.6 °F (37 °C)  Height Method: Estimated  Height: 5' 11" (180.3 " cm)  Height (inches): 71 in  Weight Method: Bed Scale  Weight: 49.5 kg (109 lb 2 oz)  Weight (lb): 109.13 lb  Ideal Body Weight (IBW), Female: 155 lb  % Ideal Body Weight, Female (lb): 70.41 %  BMI (Calculated): 15.2  BMI Grade: less than 16 protein-energy malnutrition grade III     Lab/Procedures/Meds  Pertinent Labs Reviewed: reviewed  Pertinent Labs Comments: BUN 6L, Ca 7.6L, Phos 2.0L, Alb 1.7L  Pertinent Medications Reviewed: reviewed  Pertinent Medications Comments: aspirin, enoxaparin    Estimated/Assessed Needs  Weight Used For Calorie Calculations: 49.5 kg (109 lb 2 oz)  Energy Calorie Requirements (kcal): 1732 (35 kcal/kg)  Energy Need Method: Kcal/kg  Protein Requirements: 59g (1.2g/kg)  Weight Used For Protein Calculations: 49.5 kg (109 lb 2 oz)  Estimated Fluid Requirement Method: RDA Method  RDA Method (mL): 1732     Nutrition Prescription Ordered  Current Diet Order: NPO  Current Nutrition Support Formula Ordered: Isosource 1.5  Current Nutrition Support Rate Ordered: 50 (ml)  Current Nutrition Support Frequency Ordered: ml/hr    Evaluation of Received Nutrient/Fluid Intake  Enteral Calories (kcal): 1800  Enteral Protein (gm): 81  Enteral (Free Water) Fluid (mL): 916  Free Water Flush Fluid (mL): 900  % Kcal Needs: 103  % Protein Needs: 137  I/O: 0/400  Energy Calories Required: meeting needs  Protein Required: meeting needs  Fluid Required: meeting needs  Comments: LBM 7/17  % Intake of Estimated Energy Needs: 75 - 100 %  % Meal Intake: NPO    Nutrition Risk  Level of Risk/Frequency of Follow-up:  (2xweekly)     Monitor and Evaluation  Food and Nutrient Intake: enteral nutrition intake  Food and Nutrient Adminstration: enteral and parenteral nutrition administration  Physical Activity and Function: nutrition-related ADLs and IADLs  Anthropometric Measurements: weight  Biochemical Data, Medical Tests and Procedures: electrolyte and renal panel  Nutrition-Focused Physical Findings: overall appearance      Nutrition Follow-Up  RD Follow-up?: Yes

## 2023-07-20 PROBLEM — E16.2 HYPOGLYCEMIA: Status: RESOLVED | Noted: 2023-07-17 | Resolved: 2023-07-20

## 2023-07-20 PROBLEM — J18.9 SEPSIS DUE TO PNEUMONIA: Status: RESOLVED | Noted: 2023-07-18 | Resolved: 2023-07-20

## 2023-07-20 PROBLEM — A41.9 SEPSIS DUE TO PNEUMONIA: Status: RESOLVED | Noted: 2023-07-18 | Resolved: 2023-07-20

## 2023-07-20 PROBLEM — E87.0 HYPERNATREMIA: Status: RESOLVED | Noted: 2023-07-10 | Resolved: 2023-07-20

## 2023-07-20 LAB
POCT GLUCOSE: 115 MG/DL (ref 70–110)
POCT GLUCOSE: 93 MG/DL (ref 70–110)
POCT GLUCOSE: 95 MG/DL (ref 70–110)
TB INDURATION 48 - 72 HR READ: 0 MM

## 2023-07-20 PROCEDURE — 11000001 HC ACUTE MED/SURG PRIVATE ROOM

## 2023-07-20 PROCEDURE — 25000003 PHARM REV CODE 250: Performed by: STUDENT IN AN ORGANIZED HEALTH CARE EDUCATION/TRAINING PROGRAM

## 2023-07-20 PROCEDURE — 25000003 PHARM REV CODE 250

## 2023-07-20 PROCEDURE — 94761 N-INVAS EAR/PLS OXIMETRY MLT: CPT

## 2023-07-20 RX ORDER — HYDROCODONE BITARTRATE AND ACETAMINOPHEN 7.5; 325 MG/15ML; MG/15ML
10 SOLUTION ORAL EVERY 4 HOURS PRN
Status: DISCONTINUED | OUTPATIENT
Start: 2023-07-20 | End: 2023-07-21 | Stop reason: HOSPADM

## 2023-07-20 RX ADMIN — ACETAMINOPHEN 650 MG: 160 SOLUTION ORAL at 09:07

## 2023-07-20 RX ADMIN — HYDROCODONE BITARTRATE AND ACETAMINOPHEN 10 ML: 7.5; 325 SOLUTION ORAL at 08:07

## 2023-07-20 RX ADMIN — LIDOCAINE PATCH 5% 1 PATCH: 700 PATCH TOPICAL at 09:07

## 2023-07-20 NOTE — PLAN OF CARE
Problem: Bowel Elimination Impaired (Stroke, Ischemic/Transient Ischemic Attack)  Goal: Effective Bowel Elimination  Outcome: Ongoing, Progressing     Problem: Cognitive Impairment (Stroke, Ischemic/Transient Ischemic Attack)  Goal: Optimal Cognitive Function  Outcome: Ongoing, Not Progressing     Problem: Fall Injury Risk  Goal: Absence of Fall and Fall-Related Injury  Outcome: Met     Problem: Glycemic Control Impaired (Sepsis/Septic Shock)  Goal: Blood Glucose Level Within Desired Range  Outcome: Met
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
## 2023-07-20 NOTE — PLAN OF CARE
Problem: Adult Inpatient Plan of Care  Goal: Plan of Care Review  Outcome: Ongoing, Progressing  Chart check complete. Vitals, orders, labs, and progress notes reviewed. Care plan updated. Will monitor.

## 2023-07-20 NOTE — PLAN OF CARE
Delio spoke with caitlyn at [facility] Phone: [phone]  she is still reviewing the pt to be accepted. DELIO sent hospice orders and requested additional information. DELIO will follow.     1:48 DELIO sent updated information via careport to [facility].    BASIM Wilde  [phone]     07/20/23 1250   Post-Acute Status   Post-Acute Authorization Placement   Discharge Plan   Discharge Plan A New Nursing Home placement - snf care facility

## 2023-07-20 NOTE — PLAN OF CARE
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    Code Status: DNR/DNI    Allergies: Review of patient's allergies indicates:  No Known Allergies    Diet: Tube Feeds, NPO    Activities: As tolerated    Goals of Care Treatment Preferences:  Code Status: DNR    Health care agent: Fidelina Simpson (daughter)  Health care agent number: [phone]          What is most important right now is to focus on spending time at home, avoiding the hospital, remaining as independent as possible, symptom/pain control, improvement in condition but with limits to invasive therapies.  Accordingly, we have decided that the best plan to meet the patient's goals includes continuing with treatment.      Nursing: Per Hospice Routine.     PEG Care:  Clean site daily.  Monitor skin integrity.    Routine Skin for Bedridden Patients: Apply moisture barrier cream to all skin folds and   wet areas in perineal area  daily and after baths and all bowel movements.      Oxygen: RA    Other Miscellaneous Care: Not applicative    Wound Care:  - Nursing to apply waffle overlay to bed surface and heel boots to BLE to maintain pressure injury prevention interventions  - Moisture barrier to sacrum/buttocks BID  - Skin tear standing orders for L lateral leg  - Betadine to bilateral heels    Medications:       Medication List        CONTINUE taking these medications      acetaminophen 500 MG tablet  Commonly known as: TYLENOL  Take 500 mg by mouth every 6 (six) hours as needed for Pain.     apixaban 5 mg Tab  Commonly known as: ELIQUIS  Take 1 tablet (5 mg total) by mouth 2 (two) times daily.     aspirin 81 MG EC tablet  Commonly known as: ECOTRIN  Take 81 mg by mouth once daily.     atorvastatin 40 MG tablet  Commonly known as: LIPITOR  Take 40 mg by mouth every evening.     diclofenac sodium 1 % Gel  Commonly known as: VOLTAREN  Apply 2 g topically daily as needed.     HYDROcodone-acetaminophen  mg per tablet  Commonly known as: NORCO  Take 1 tablet by mouth every 6 (six) hours as needed (chronic pain). Take 1/2 tablet by mouth in the morning and 1/2 tablet by mouth in the evening as needed     lactulose 10 gram/15 mL solution  Commonly known as: CHRONULAC  Take 15 mLs by mouth daily as needed.     miconazole 2 % cream  Commonly known as: MICOTIN  Apply topically 2 (two) times daily as needed (apply to affected area twice daily as needed.).     MUCINEX FAST-MAX CHEST-CONGEST 100 mg/5 mL syrup  Generic drug: guaiFENesin 100 mg/5 ml  Take 200 mg by mouth 3 (three) times daily as needed for Cough.     promethazine 25 MG tablet  Commonly known as: PHENERGAN  Take 1 tablet (25 mg total) by mouth every 6 (six) hours as needed for Nausea.     SALONPAS (CAPSAICIN-MENTHOL) 0.025-1.25 % Ptmd  Generic drug: capsaicin-menthol  Apply 1 patch topically daily as needed.     scopolamine 1.3-1.5 mg (1 mg over 3 days)  Commonly known as:  TRANSDERM-SCOP  Place 1 patch onto the skin every 72 hours.            STOP taking these medications      TAGRISSO 40 mg Tab  Generic drug: osimertinib                DIABETES CARE: N/a    Future Orders:  Hospice Medical Director may dictate new orders for comfortable care measures & sign death certificate.        _________________________________  John Cardona MD  07/21/2023

## 2023-07-20 NOTE — NURSING
Pt had 7 beat run of vtach, in bed , Hob elevated, no signs of distress noted. VSS , notified MD on call.
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
## 2023-07-20 NOTE — PLAN OF CARE
AURA spoke with Domenica at [facility] Phone: [phone]. Domenica stated that her DON is reviewing the pt and they will get back with AURA by this afternoon to see if they will be able to accept the pt. AURA will follow.     Pawel Wiliam, MSW  [phone]    Future Appointments   Date Time Provider Department Center   7/24/2023  1:00 PM Garry Mcqueen MD [facility] HEM ONC [place] [facility]   8/3/2023  9:00 AM Yasir Aaron NP [facility] CARDIO [place]   8/9/2023  1:40 PM Garry Mcqueen MD [facility] HEM ONC [place] [facility]        07/20/23 0922   Post-Acute Status   Post-Acute Authorization Placement;Hospice   Post-Acute Placement Status Referrals Sent   Hospice Status Referrals Sent   Coverage PHN

## 2023-07-20 NOTE — PLAN OF CARE
"SW received the following message from Domenica Shar[facility] Phone: [phone]  "Ms. Modi admit date will likely be pushed back to tomorrow since the contract is pending and the family will need to fill out a Medicaid application." AURA will follow and plan for d/c tomorrow.     Pawel Swain, MSW  [phone]    Future Appointments   Date Time Provider Department Center   7/24/2023  1:00 PM Garry Mcqueen MD [facility] HEM ONC [place] [facility]   8/3/2023  9:00 AM Yasir Aaron NP [facility] CARDIO [place]   8/9/2023  1:40 PM Garry Mcqueen MD [facility] HEM ONC [place] [facility]        07/20/23 1559   Post-Acute Status   Post-Acute Authorization Placement   Coverage PHN   Discharge Delays   (paperwork)   Discharge Plan   Discharge Plan A New Nursing Home placement - long term care facility       "

## 2023-07-21 VITALS
HEIGHT: 71 IN | BODY MASS INDEX: 17.6 KG/M2 | TEMPERATURE: 98 F | DIASTOLIC BLOOD PRESSURE: 57 MMHG | SYSTOLIC BLOOD PRESSURE: 113 MMHG | WEIGHT: 125.69 LBS | HEART RATE: 91 BPM | OXYGEN SATURATION: 96 % | RESPIRATION RATE: 19 BRPM

## 2023-07-21 PROBLEM — Z51.5 COMFORT MEASURES ONLY STATUS: Status: ACTIVE | Noted: 2023-07-21

## 2023-07-21 LAB — POCT GLUCOSE: 94 MG/DL (ref 70–110)

## 2023-07-21 PROCEDURE — 25000003 PHARM REV CODE 250

## 2023-07-21 PROCEDURE — 94761 N-INVAS EAR/PLS OXIMETRY MLT: CPT

## 2023-07-21 RX ADMIN — HYDROCODONE BITARTRATE AND ACETAMINOPHEN 10 ML: 7.5; 325 SOLUTION ORAL at 06:07

## 2023-07-21 RX ADMIN — HYDROCODONE BITARTRATE AND ACETAMINOPHEN 10 ML: 7.5; 325 SOLUTION ORAL at 01:07

## 2023-07-21 NOTE — PLAN OF CARE
Recommendation:  1. Continue current TF as tolerated.   2. Monitor weight/labs.   3. RD to continue to follow to monitor TF tolerance    Goals:  TF to meet at least 85% EEN/EPN by RD follow up  Nutrition Goal Status: progressing towards goal

## 2023-07-21 NOTE — PROGRESS NOTES
"[facility]  [facility]  Progress Note    SUMMARY       Recommendations    Recommendation:  1. Continue current TF as tolerated.   2. Monitor weight/labs.   3. RD to continue to follow to monitor TF tolerance    Goals:  TF to meet at least 85% EEN/EPN by RD follow up  Nutrition Goal Status: progressing towards goal  Communication of RD Recs: reviewed with RN Jane)    Assessment and Plan  Renal/  Hypernatremia-resolved as of 7/20/2023  Diagnosis:  Inadequate energy intake    Related to (etiology):   dysphagia    Signs and Symptoms (as evidenced by):   SLP recs NPO, await PEG tube, NPO x 7 days    Interventions:  Collaboration with other providers    Nutrition Diagnosis Status:   Improving (TF starting today)       Malnutrition Assessment  Unable to assess NFPE at this time     Reason for Assessment  Reason For Assessment: RD follow-up  Diagnosis:  (hypernatremia)  Relevant Medical History: pancreatitis, anemia, lung cancer, dementia, stroke, PEG removal, trach removal  General Information Comments: Pt s/p PEG tube placement 7/18. Receiving TF of Isosource 1.5 at 50ml/hr with 150cc free water flush q4h. Hector 14- R leg skin tear. Noted plan to d/c to hospice. Unable to assess NFPE.  Nutrition Discharge Planning: d/c needs to be determined    Nutrition Risk Screen  Nutrition Risk Screen: difficulty chewing/swallowing, tube feeding or parenteral nutrition    Nutrition/Diet History  Food Preferences: no Lutheran or cultural food prefs identified  Spiritual, Cultural Beliefs, Jainism Practices, Values that Affect Care: no  Factors Affecting Nutritional Intake: NPO, difficulty/impaired swallowing    Anthropometrics  Temp: 97 °F (36.1 °C)  Height Method: Estimated  Height: 5' 11" (180.3 cm)  Height (inches): 71 in  Weight Method: Bed Scale  Weight: 57 kg (125 lb 10.6 oz)  Weight (lb): 125.66 lb  Ideal Body Weight (IBW), Female: 155 lb  % Ideal Body Weight, Female (lb): 81.07 %  BMI (Calculated): " 17.5  BMI Grade: 17 - 18.4 protein-energy malnutrition grade I     Lab/Procedures/Meds  Pertinent Labs Reviewed: reviewed  Pertinent Labs Comments: BUN 6L, Ca 7.6L, Phos 2.0L, Alb 1.7L  Pertinent Medications Reviewed: reviewed  Pertinent Medications Comments: aspirin, enoxaparin    Estimated/Assessed Needs  Weight Used For Calorie Calculations: 57 kg (125 lb 10.6 oz)  Energy Calorie Requirements (kcal): 1710 (30 kcal/kg)  Energy Need Method: Kcal/kg  Protein Requirements: 68g (1.2g/kg)  Weight Used For Protein Calculations: 57 kg (125 lb 10.6 oz)  Estimated Fluid Requirement Method: RDA Method  RDA Method (mL): 1710     Nutrition Prescription Ordered  Current Diet Order: NPO  Current Nutrition Support Formula Ordered: Isosource 1.5  Current Nutrition Support Rate Ordered: 50 (ml)  Current Nutrition Support Frequency Ordered: ml/hr    Evaluation of Received Nutrient/Fluid Intake  Enteral Calories (kcal): 1800  Enteral Protein (gm): 81  Enteral (Free Water) Fluid (mL): 916  Free Water Flush Fluid (mL): 900  % Kcal Needs: 105  % Protein Needs: 119  I/O: 1875/0  Energy Calories Required: meeting needs  Protein Required: meeting needs  Fluid Required: meeting needs  Comments: LBM 7/20  % Intake of Estimated Energy Needs: 75 - 100 %  % Meal Intake: NPO    Nutrition Risk  Level of Risk/Frequency of Follow-up:  (2xweekly)     Monitor and Evaluation  Food and Nutrient Intake: enteral nutrition intake  Food and Nutrient Adminstration: enteral and parenteral nutrition administration  Physical Activity and Function: nutrition-related ADLs and IADLs  Anthropometric Measurements: weight  Biochemical Data, Medical Tests and Procedures: electrolyte and renal panel  Nutrition-Focused Physical Findings: overall appearance     Nutrition Follow-Up  RD Follow-up?: Yes

## 2023-07-21 NOTE — PLAN OF CARE
ROSMERY spoke with caitlyn at [facility], she stated she needed d/c orders for pt. ROSMERY hard faxed orders and sent orders via [employer]. Caitlyn will call rosmery back once there is a number for report. ROSMERY will follow.     PawelBASIM Vang  [phone]    Future Appointments   Date Time Provider Department Center   7/24/2023  1:00 PM Garry Mcqueen MD [facility] HEM ONC [place] [facility]   8/3/2023  9:00 AM Yasir Aaorn NP [facility] CARDIO [place]   8/9/2023  1:40 PM Garry Mcqueen MD [facility] HEM ONC [place] [facility]        07/21/23 1042   Post-Acute Status   Post-Acute Authorization Placement   Coverage phn

## 2023-07-21 NOTE — PLAN OF CARE
Problem: Adjustment to Illness (Stroke, Ischemic/Transient Ischemic Attack)  Goal: Optimal Coping  Outcome: Ongoing, Progressing     Problem: Cerebral Tissue Perfusion (Stroke, Ischemic/Transient Ischemic Attack)  Goal: Optimal Cerebral Tissue Perfusion  Outcome: Ongoing, Progressing     Problem: Cognitive Impairment (Stroke, Ischemic/Transient Ischemic Attack)  Goal: Optimal Cognitive Function  Outcome: Ongoing, Progressing     Problem: Adjustment to Illness (Stroke, Ischemic/Transient Ischemic Attack)  Goal: Optimal Coping  Outcome: Ongoing, Progressing     Problem: Cerebral Tissue Perfusion (Stroke, Ischemic/Transient Ischemic Attack)  Goal: Optimal Cerebral Tissue Perfusion  Outcome: Ongoing, Progressing     Problem: Cognitive Impairment (Stroke, Ischemic/Transient Ischemic Attack)  Goal: Optimal Cognitive Function  Outcome: Ongoing, Progressing     Problem: Bowel Elimination Impaired (Stroke, Ischemic/Transient Ischemic Attack)  Goal: Effective Bowel Elimination  Outcome: Ongoing, Not Progressing

## 2023-07-21 NOTE — PLAN OF CARE
Ambulance auth request sent to [facility].       07/21/23 1404   Post-Acute Status   Post-Acute Authorization Other       Gavin Mcgovern, RN   Supervisor Case Management-[place]  [phone]

## 2023-07-21 NOTE — PLAN OF CARE
Sw met with pt and pt's tammy Kitchen at bedside to complete final assessment. Pt will d/c today via ambulance to [facility] Phone: [phone]. Transportation is set for 3:30pm PHN gave auth for transport  Zach solis-[id number]. Report can be called to [phone] to the 100 [address] nurse. Pt will go to room 137A. Pt's tammy Kitchen signed pt choice form for [facility]. Rounds completed on pt. All questions addressed. Bedside nurse to discuss d/c medications. Discussed importance to attend all f/u appts and take medications as prescribed. Verbalized understanding. Case Management to sign off.       Ambulance Auth # [id number]   new ETA FOR TRANSPORT IS  5:35 PM    PawelBASIM Vang  [phone]    Future Appointments   Date Time Provider Department Center   7/24/2023  1:00 PM Garry Mcqueen MD [facility] HEM ONC [place] [facility]   8/3/2023  9:00 AM Yasir Aaron NP [facility] CARDIO [place]   8/9/2023  1:40 PM Garry Mcqueen MD [facility] HEM ONC [place] [facility]        07/21/23 1526   Final Note   Assessment Type Final Discharge Note   Anticipated Discharge Disposition assisted Nu   What phone number can be called within the next 1-3 days to see how you are doing after discharge? [phone]   Hospital Resources/Appts/Education Provided Appointments scheduled and added to AVS   Post-Acute Status   Post-Acute Placement Status Set-up Complete/Auth obtained   Hospice Status Set-up Complete/Auth obtained   Coverage phn   Discharge Delays None known at this time

## 2023-07-21 NOTE — PLAN OF CARE
AURA spoke with Domenica [phone] with [facility] Phone: [phone] to get an update. Aura was told that family is meeting with the  now and will call AURA back once they are finished. AURA will follow.     Pawel Swain, BASIM  [phone]    Future Appointments   Date Time Provider Department Center   7/24/2023  1:00 PM Garry Mcqueen MD [facility] HEM ONC [place] [facility]   8/3/2023  9:00 AM Yasir Aaron NP [facility] CARDIO [place]   8/9/2023  1:40 PM Garry Mcqueen MD [facility] HEM ONC [place] [facility]        07/21/23 1334   Post-Acute Status   Post-Acute Authorization Placement   Discharge Plan   Discharge Plan A New Nursing Home placement - [facility] care facility

## 2023-07-21 NOTE — PLAN OF CARE
[facility]  Department of Hospital Medicine  [address] [place] [address]  [phone] after hours  [phone] fax    HOSPICE  ORDERS    07/21/2023    Admit to Hospice:  [facility]     Diagnoses:   Active Hospital Problems    Diagnosis  POA    *Failure to thrive in adult [R62.7]  Yes    Comfort measures only status [Z51.5]  Not Applicable    BEST (acute kidney injury) [N17.9]  Yes    Confusion [R41.0]  Yes    Chronic ischemic right MCA stroke [I69.30]  Not Applicable    Adenocarcinoma of left lung, stage 4 EGFR positive [C34.92]  Yes    HLD (hyperlipidemia) [E78.5]  Yes    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    Code Status: DNR/DNI    Allergies: Review of patient's allergies indicates:  No Known Allergies    Diet: Tube Feeds, NPO    Activities: As tolerated    Goals of Care Treatment Preferences:  Code Status: DNR    Health care agent: Fidelina Simpson (daughter)  Health care agent number: [phone]          What is most important right now is to focus on spending time at home, avoiding the hospital, remaining as independent as possible, symptom/pain control, improvement in condition but with limits to invasive therapies.  Accordingly, we have decided that the best plan to meet the patient's goals includes continuing with treatment.      Nursing: Per Hospice Routine.     PEG Care:  Clean site daily.  Monitor skin integrity.    Routine Skin for Bedridden Patients: Apply moisture barrier  cream to all skin folds and   wet areas in perineal area daily and after baths and all bowel movements.      Oxygen: RA    Other Miscellaneous Care: Not applicative    Wound Care:  - Nursing to apply waffle overlay to bed surface and heel boots to BLE to maintain pressure injury prevention interventions  - Moisture barrier to sacrum/buttocks BID  - Skin tear standing orders for L lateral leg  - Betadine to bilateral heels  - Low air loss mattress    Medications:       Medication List        CONTINUE taking these medications      acetaminophen 500 MG tablet  Commonly known as: TYLENOL  Take 500 mg by mouth every 6 (six) hours as needed for Pain.     apixaban 5 mg Tab  Commonly known as: ELIQUIS  Take 1 tablet (5 mg total) by mouth 2 (two) times daily.     aspirin 81 MG EC tablet  Commonly known as: ECOTRIN  Take 81 mg by mouth once daily.     atorvastatin 40 MG tablet  Commonly known as: LIPITOR  Take 40 mg by mouth every evening.     diclofenac sodium 1 % Gel  Commonly known as: VOLTAREN  Apply 2 g topically daily as needed.     HYDROcodone-acetaminophen  mg per tablet  Commonly known as: NORCO  Take 1 tablet by mouth every 6 (six) hours as needed (chronic pain). Take 1/2 tablet by mouth in the morning and 1/2 tablet by mouth in the evening as needed     lactulose 10 gram/15 mL solution  Commonly known as: CHRONULAC  Take 15 mLs by mouth daily as needed.     miconazole 2 % cream  Commonly known as: MICOTIN  Apply topically 2 (two) times daily as needed (apply to affected area twice daily as needed.).     MUCINEX FAST-MAX CHEST-CONGEST 100 mg/5 mL syrup  Generic drug: guaiFENesin 100 mg/5 ml  Take 200 mg by mouth 3 (three) times daily as needed for Cough.     promethazine 25 MG tablet  Commonly known as: PHENERGAN  Take 1 tablet (25 mg total) by mouth every 6 (six) hours as needed for Nausea.     SALONPAS (CAPSAICIN-MENTHOL) 0.025-1.25 % Ptmd  Generic drug: capsaicin-menthol  Apply 1 patch topically daily as  needed.     scopolamine 1.3-1.5 mg (1 mg over 3 days)  Commonly known as: TRANSDERM-SCOP  Place 1 patch onto the skin every 72 hours.            STOP taking these medications      TAGRISSO 40 mg Tab  Generic drug: osimertinib                DIABETES CARE: N/a    Future Orders:  Hospice Medical Director may dictate new orders for comfortable care measures & sign death certificate.        _________________________________  Paula Dick MD  07/21/2023

## 2023-07-24 ENCOUNTER — PATIENT MESSAGE (OUTPATIENT)
Dept: RESEARCH | Facility: HOSPITAL | Age: 78
End: 2023-07-24
Payer: MEDICARE

## 2023-07-31 ENCOUNTER — SPECIALTY PHARMACY (OUTPATIENT)
Dept: PHARMACY | Facility: CLINIC | Age: 78
End: 2023-07-31
Payer: MEDICARE

## 2023-07-31 ENCOUNTER — PATIENT MESSAGE (OUTPATIENT)
Dept: RESEARCH | Facility: HOSPITAL | Age: 78
End: 2023-07-31
Payer: MEDICARE

## 2023-07-31 NOTE — TELEPHONE ENCOUNTER
Outgoing call to patient's daughter to determine if she needs refill. Upon review of notes she was discharged to hospice. Will close tagrisso encounter since pt is enrolled in hospice and the script has been d/maria antonia

## 2023-08-21 PROBLEM — I21.4 NSTEMI (NON-ST ELEVATED MYOCARDIAL INFARCTION): Status: RESOLVED | Noted: 2023-05-18 | Resolved: 2023-08-21

## 2023-10-09 PROBLEM — Z86.73 CHRONIC ISCHEMIC RIGHT MCA STROKE: Status: RESOLVED | Noted: 2023-06-07 | Resolved: 2023-10-09

## 2023-10-23 PROBLEM — N17.9 AKI (ACUTE KIDNEY INJURY): Status: RESOLVED | Noted: 2023-07-18 | Resolved: 2023-10-23
